# Patient Record
Sex: FEMALE | Race: WHITE | Employment: OTHER | URBAN - METROPOLITAN AREA
[De-identification: names, ages, dates, MRNs, and addresses within clinical notes are randomized per-mention and may not be internally consistent; named-entity substitution may affect disease eponyms.]

---

## 2017-05-15 ENCOUNTER — TELEPHONE (OUTPATIENT)
Dept: INTERNAL MEDICINE CLINIC | Age: 82
End: 2017-05-15

## 2017-05-15 NOTE — TELEPHONE ENCOUNTER
Please call to clarify script Dr. Zia Reynoso sent to the [facility] and they didn't have enough of the medication to fill.   Please call [facility]

## 2017-10-23 RX ORDER — CLOTRIMAZOLE AND BETAMETHASONE DIPROPIONATE 10; .64 MG/G; MG/G
CREAM TOPICAL
Qty: 15 G | Refills: 2 | Status: SHIPPED | OUTPATIENT
Start: 2017-10-23

## 2017-11-14 ENCOUNTER — TELEPHONE (OUTPATIENT)
Dept: INTERNAL MEDICINE CLINIC | Age: 82
End: 2017-11-14

## 2017-11-14 NOTE — TELEPHONE ENCOUNTER
Ellwood Ganser with [facility] called stating she would like to speak with NP Malu or Dr. Boogie Sosa because the antibiotic for patient needs to be called in or e-scribed.  She can be reached at [phone] please advise

## 2017-11-23 ENCOUNTER — APPOINTMENT (OUTPATIENT)
Dept: GENERAL RADIOLOGY | Age: 82
DRG: 871 | End: 2017-11-23
Attending: STUDENT IN AN ORGANIZED HEALTH CARE EDUCATION/TRAINING PROGRAM
Payer: MEDICARE

## 2017-11-23 ENCOUNTER — HOSPITAL ENCOUNTER (INPATIENT)
Age: 82
LOS: 12 days | Discharge: NURSING, REHAB OR LONG-TERM CARE | DRG: 871 | End: 2017-12-05
Attending: STUDENT IN AN ORGANIZED HEALTH CARE EDUCATION/TRAINING PROGRAM | Admitting: FAMILY MEDICINE
Payer: MEDICARE

## 2017-11-23 ENCOUNTER — APPOINTMENT (OUTPATIENT)
Dept: CT IMAGING | Age: 82
DRG: 871 | End: 2017-11-23
Attending: STUDENT IN AN ORGANIZED HEALTH CARE EDUCATION/TRAINING PROGRAM
Payer: MEDICARE

## 2017-11-23 DIAGNOSIS — R47.81 SLURRED SPEECH: Primary | ICD-10-CM

## 2017-11-23 DIAGNOSIS — G45.1 HEMISPHERIC CAROTID ARTERY SYNDROME: ICD-10-CM

## 2017-11-23 PROBLEM — I63.9 CVA (CEREBRAL VASCULAR ACCIDENT) (HCC): Status: ACTIVE | Noted: 2017-11-23

## 2017-11-23 LAB
ALBUMIN SERPL-MCNC: 2.7 G/DL (ref 3.5–5)
ALBUMIN/GLOB SERPL: 0.6 {RATIO} (ref 1.1–2.2)
ALP SERPL-CCNC: 88 U/L (ref 45–117)
ALT SERPL-CCNC: 14 U/L (ref 12–78)
ANION GAP SERPL CALC-SCNC: 7 MMOL/L (ref 5–15)
AST SERPL-CCNC: 18 U/L (ref 15–37)
BASOPHILS # BLD: 0 K/UL (ref 0–0.1)
BASOPHILS NFR BLD: 0 % (ref 0–1)
BILIRUB SERPL-MCNC: 1.5 MG/DL (ref 0.2–1)
BUN SERPL-MCNC: 13 MG/DL (ref 6–20)
BUN/CREAT SERPL: 14 (ref 12–20)
CALCIUM SERPL-MCNC: 9 MG/DL (ref 8.5–10.1)
CHLORIDE SERPL-SCNC: 104 MMOL/L (ref 97–108)
CO2 SERPL-SCNC: 27 MMOL/L (ref 21–32)
CREAT SERPL-MCNC: 0.9 MG/DL (ref 0.55–1.02)
DIFFERENTIAL METHOD BLD: ABNORMAL
EOSINOPHIL # BLD: 0 K/UL (ref 0–0.4)
EOSINOPHIL NFR BLD: 0 % (ref 0–7)
ERYTHROCYTE [DISTWIDTH] IN BLOOD BY AUTOMATED COUNT: 13.8 % (ref 11.5–14.5)
GLOBULIN SER CALC-MCNC: 4.2 G/DL (ref 2–4)
GLUCOSE BLD STRIP.AUTO-MCNC: 101 MG/DL (ref 65–100)
GLUCOSE BLD STRIP.AUTO-MCNC: 140 MG/DL (ref 65–100)
GLUCOSE BLD STRIP.AUTO-MCNC: 152 MG/DL (ref 65–100)
GLUCOSE SERPL-MCNC: 131 MG/DL (ref 65–100)
HCT VFR BLD AUTO: 39.2 % (ref 35–47)
HGB BLD-MCNC: 12.6 G/DL (ref 11.5–16)
INR BLD: 1.5 (ref 0.9–1.2)
LACTATE SERPL-SCNC: 1.8 MMOL/L (ref 0.4–2)
LYMPHOCYTES # BLD: 2.4 K/UL (ref 0.8–3.5)
LYMPHOCYTES NFR BLD: 12 % (ref 12–49)
MCH RBC QN AUTO: 29.9 PG (ref 26–34)
MCHC RBC AUTO-ENTMCNC: 32.1 G/DL (ref 30–36.5)
MCV RBC AUTO: 93.1 FL (ref 80–99)
MONOCYTES # BLD: 1.8 K/UL (ref 0–1)
MONOCYTES NFR BLD: 9 % (ref 5–13)
NEUTS SEG # BLD: 16.1 K/UL (ref 1.8–8)
NEUTS SEG NFR BLD: 79 % (ref 32–75)
PLATELET # BLD AUTO: 238 K/UL (ref 150–400)
POTASSIUM SERPL-SCNC: 4.5 MMOL/L (ref 3.5–5.1)
PROT SERPL-MCNC: 6.9 G/DL (ref 6.4–8.2)
RBC # BLD AUTO: 4.21 M/UL (ref 3.8–5.2)
RBC MORPH BLD: ABNORMAL
SERVICE CMNT-IMP: ABNORMAL
SODIUM SERPL-SCNC: 138 MMOL/L (ref 136–145)
WBC # BLD AUTO: 20.3 K/UL (ref 3.6–11)

## 2017-11-23 PROCEDURE — 99285 EMERGENCY DEPT VISIT HI MDM: CPT

## 2017-11-23 PROCEDURE — 85025 COMPLETE CBC W/AUTO DIFF WBC: CPT | Performed by: STUDENT IN AN ORGANIZED HEALTH CARE EDUCATION/TRAINING PROGRAM

## 2017-11-23 PROCEDURE — 82962 GLUCOSE BLOOD TEST: CPT

## 2017-11-23 PROCEDURE — 77030011943

## 2017-11-23 PROCEDURE — 70450 CT HEAD/BRAIN W/O DYE: CPT

## 2017-11-23 PROCEDURE — 83605 ASSAY OF LACTIC ACID: CPT | Performed by: STUDENT IN AN ORGANIZED HEALTH CARE EDUCATION/TRAINING PROGRAM

## 2017-11-23 PROCEDURE — 80053 COMPREHEN METABOLIC PANEL: CPT | Performed by: STUDENT IN AN ORGANIZED HEALTH CARE EDUCATION/TRAINING PROGRAM

## 2017-11-23 PROCEDURE — 74011000258 HC RX REV CODE- 258: Performed by: FAMILY MEDICINE

## 2017-11-23 PROCEDURE — 74011000250 HC RX REV CODE- 250: Performed by: FAMILY MEDICINE

## 2017-11-23 PROCEDURE — 93005 ELECTROCARDIOGRAM TRACING: CPT

## 2017-11-23 PROCEDURE — 65660000000 HC RM CCU STEPDOWN

## 2017-11-23 PROCEDURE — 36415 COLL VENOUS BLD VENIPUNCTURE: CPT | Performed by: STUDENT IN AN ORGANIZED HEALTH CARE EDUCATION/TRAINING PROGRAM

## 2017-11-23 PROCEDURE — 74011250636 HC RX REV CODE- 250/636: Performed by: STUDENT IN AN ORGANIZED HEALTH CARE EDUCATION/TRAINING PROGRAM

## 2017-11-23 PROCEDURE — 74011250637 HC RX REV CODE- 250/637: Performed by: FAMILY MEDICINE

## 2017-11-23 PROCEDURE — 74011250636 HC RX REV CODE- 250/636: Performed by: FAMILY MEDICINE

## 2017-11-23 PROCEDURE — 85610 PROTHROMBIN TIME: CPT

## 2017-11-23 PROCEDURE — 71010 XR CHEST PORT: CPT

## 2017-11-23 PROCEDURE — 96360 HYDRATION IV INFUSION INIT: CPT

## 2017-11-23 RX ORDER — SORBITOL SOLUTION 70 %
30 SOLUTION, ORAL MISCELLANEOUS
COMMUNITY

## 2017-11-23 RX ORDER — MAGNESIUM SULFATE 100 %
4 CRYSTALS MISCELLANEOUS AS NEEDED
Status: DISCONTINUED | OUTPATIENT
Start: 2017-11-23 | End: 2017-12-05 | Stop reason: HOSPADM

## 2017-11-23 RX ORDER — LEVOFLOXACIN 5 MG/ML
750 INJECTION, SOLUTION INTRAVENOUS ONCE
Status: COMPLETED | OUTPATIENT
Start: 2017-11-23 | End: 2017-11-23

## 2017-11-23 RX ORDER — POLYETHYLENE GLYCOL 3350 17 G/17G
17 POWDER, FOR SOLUTION ORAL DAILY
Status: DISCONTINUED | OUTPATIENT
Start: 2017-11-24 | End: 2017-11-24

## 2017-11-23 RX ORDER — LEVOFLOXACIN 5 MG/ML
750 INJECTION, SOLUTION INTRAVENOUS
Status: DISCONTINUED | OUTPATIENT
Start: 2017-11-25 | End: 2017-11-24

## 2017-11-23 RX ORDER — GUAIFENESIN 600 MG/1
1200 TABLET, EXTENDED RELEASE ORAL 2 TIMES DAILY
Status: DISCONTINUED | OUTPATIENT
Start: 2017-11-23 | End: 2017-12-05 | Stop reason: HOSPADM

## 2017-11-23 RX ORDER — DEXTROSE MONOHYDRATE AND SODIUM CHLORIDE 5; .45 G/100ML; G/100ML
100 INJECTION, SOLUTION INTRAVENOUS CONTINUOUS
Status: DISCONTINUED | OUTPATIENT
Start: 2017-11-23 | End: 2017-11-27

## 2017-11-23 RX ORDER — DEXTROSE 50 % IN WATER (D50W) INTRAVENOUS SYRINGE
12.5-25 AS NEEDED
Status: DISCONTINUED | OUTPATIENT
Start: 2017-11-23 | End: 2017-12-05 | Stop reason: HOSPADM

## 2017-11-23 RX ORDER — PANTOPRAZOLE SODIUM 40 MG/1
40 TABLET, DELAYED RELEASE ORAL DAILY
Status: DISCONTINUED | OUTPATIENT
Start: 2017-11-24 | End: 2017-12-05 | Stop reason: HOSPADM

## 2017-11-23 RX ORDER — INSULIN LISPRO 100 [IU]/ML
INJECTION, SOLUTION INTRAVENOUS; SUBCUTANEOUS
Status: DISCONTINUED | OUTPATIENT
Start: 2017-11-23 | End: 2017-12-05 | Stop reason: HOSPADM

## 2017-11-23 RX ORDER — LEVOTHYROXINE SODIUM 50 UG/1
50 TABLET ORAL
Status: DISCONTINUED | OUTPATIENT
Start: 2017-11-24 | End: 2017-11-29

## 2017-11-23 RX ORDER — LIDOCAINE HCL 4 G/100G
CREAM TOPICAL
COMMUNITY

## 2017-11-23 RX ORDER — HYDROCORTISONE 25 MG/G
CREAM TOPICAL AS NEEDED
COMMUNITY

## 2017-11-23 RX ORDER — FLUTICASONE FUROATE AND VILANTEROL 100; 25 UG/1; UG/1
1 POWDER RESPIRATORY (INHALATION) DAILY
COMMUNITY

## 2017-11-23 RX ORDER — POLYETHYLENE GLYCOL 3350 17 G/17G
17 POWDER, FOR SOLUTION ORAL DAILY
COMMUNITY

## 2017-11-23 RX ORDER — METRONIDAZOLE 500 MG/100ML
500 INJECTION, SOLUTION INTRAVENOUS EVERY 8 HOURS
Status: DISCONTINUED | OUTPATIENT
Start: 2017-11-23 | End: 2017-11-26

## 2017-11-23 RX ORDER — METFORMIN HYDROCHLORIDE 1000 MG/1
1000 TABLET ORAL 2 TIMES DAILY WITH MEALS
COMMUNITY

## 2017-11-23 RX ORDER — PROPRANOLOL HYDROCHLORIDE 10 MG/1
10 TABLET ORAL DAILY
Status: DISCONTINUED | OUTPATIENT
Start: 2017-11-24 | End: 2017-12-05 | Stop reason: HOSPADM

## 2017-11-23 RX ORDER — ATORVASTATIN CALCIUM 20 MG/1
20 TABLET, FILM COATED ORAL
Status: DISCONTINUED | OUTPATIENT
Start: 2017-11-23 | End: 2017-12-05 | Stop reason: HOSPADM

## 2017-11-23 RX ORDER — IPRATROPIUM BROMIDE AND ALBUTEROL SULFATE 2.5; .5 MG/3ML; MG/3ML
3 SOLUTION RESPIRATORY (INHALATION)
Status: DISCONTINUED | OUTPATIENT
Start: 2017-11-23 | End: 2017-12-05 | Stop reason: HOSPADM

## 2017-11-23 RX ORDER — SODIUM CHLORIDE 9 MG/ML
500 INJECTION, SOLUTION INTRAVENOUS ONCE
Status: COMPLETED | OUTPATIENT
Start: 2017-11-23 | End: 2017-11-23

## 2017-11-23 RX ORDER — CEPHALEXIN 500 MG/1
500 CAPSULE ORAL 2 TIMES DAILY
COMMUNITY
End: 2017-12-05

## 2017-11-23 RX ORDER — ACETAMINOPHEN 325 MG/1
650 TABLET ORAL
Status: DISCONTINUED | OUTPATIENT
Start: 2017-11-23 | End: 2017-12-05 | Stop reason: HOSPADM

## 2017-11-23 RX ORDER — NYSTATIN 100000 [USP'U]/G
POWDER TOPICAL 2 TIMES DAILY
Status: DISCONTINUED | OUTPATIENT
Start: 2017-11-23 | End: 2017-12-01

## 2017-11-23 RX ORDER — MIRTAZAPINE 15 MG/1
7.5 TABLET, FILM COATED ORAL
Status: DISCONTINUED | OUTPATIENT
Start: 2017-11-23 | End: 2017-12-05 | Stop reason: HOSPADM

## 2017-11-23 RX ADMIN — GUAIFENESIN 1200 MG: 600 TABLET, EXTENDED RELEASE ORAL at 17:38

## 2017-11-23 RX ADMIN — METRONIDAZOLE 500 MG: 500 INJECTION, SOLUTION INTRAVENOUS at 23:14

## 2017-11-23 RX ADMIN — SODIUM CHLORIDE 500 ML: 900 INJECTION, SOLUTION INTRAVENOUS at 14:30

## 2017-11-23 RX ADMIN — ACETAMINOPHEN 650 MG: 325 TABLET, FILM COATED ORAL at 17:39

## 2017-11-23 RX ADMIN — LEVOFLOXACIN 750 MG: 5 INJECTION, SOLUTION INTRAVENOUS at 16:47

## 2017-11-23 RX ADMIN — MIRTAZAPINE 7.5 MG: 15 TABLET, FILM COATED ORAL at 21:24

## 2017-11-23 RX ADMIN — DEXTROSE MONOHYDRATE AND SODIUM CHLORIDE 100 ML/HR: 5; .45 INJECTION, SOLUTION INTRAVENOUS at 17:39

## 2017-11-23 RX ADMIN — ATORVASTATIN CALCIUM 20 MG: 20 TABLET, FILM COATED ORAL at 21:24

## 2017-11-23 RX ADMIN — METRONIDAZOLE 500 MG: 500 INJECTION, SOLUTION INTRAVENOUS at 15:27

## 2017-11-23 NOTE — PROGRESS NOTES
Day #1 of Levaquin  Indication:  bloodstream infection  Leukocytosis: No evidence of PNA, await UA. Possibly diarrhea. Need to R/O C diff. Other possibilities of ovarian mass infection and or mass effect on GI ( Diarrhea). Current regimen:  750 mg IV Q24H  Abx regimen: Levaquin and Flagyl  Recent Labs      17   1244   WBC  20.3*   CREA  0.90   BUN  13     Est CrCl: 48 ml/min; UO: not assessed  Temp (24hrs), Av.5 °F (37.5 °C), Min:98.7 °F (37.1 °C), Max:101 °F (38.3 °C)    Cultures: None thus far    Plan: Change to 750 mg IV Q48H for CrCl between 20-49 ml/min.      Rola Lott, PharmD

## 2017-11-23 NOTE — ED PROVIDER NOTES
HPI Comments: 80 y.o. female with past medical history significant for CAD, Hypertension, and COPD who presents from [address] with chief complaint of Slurred Speech. Per EMS, nursing facility noticed onset at 1110 today prior to arrival of slurred speech at difficult swallowing. Per nursing staff, patient is A&Ox2 at baseline. Nursing facility notes patient has had recent onset \"over the past three days\" of intermittent diarrhea and generalized weakness. Per EMS, upon arrival patient appeared tachyphemic and placed on 2L of oxygen. Pt had a measured BG of 160 en route. Pt presents to ED with noted slurred speech. Pt denies any acute pain or discomfort. Pt denies current use of blood thinners. There are no other acute medical concerns at this time. PCP: Pilo Breaux MD    Note written by Sveta Talamantes, as dictated by Saulo Reilly MD 12:25 PM    The history is provided by the patient, the EMS personnel and the nursing home. Past Medical History:   Diagnosis Date    CAD (coronary artery disease)     COPD (chronic obstructive pulmonary disease) ([facility] [address]     Hypercholesteremia     Hypertension     Ill-defined condition     mass on ovaries    Psychiatric disorder     bipolar       No past surgical history on file. No family history on file. Social History     Social History    Marital status:      Spouse name: N/A    Number of children: N/A    Years of education: N/A     Occupational History    Not on file. Social History Main Topics    Smoking status: Former Smoker    Smokeless tobacco: Not on file    Alcohol use No    Drug use: No    Sexual activity: Not Currently     Other Topics Concern    Not on file     Social History Narrative         ALLERGIES: Enablex [darifenacin]    Review of Systems   Constitutional: Negative for chills and fever. HENT: Positive for trouble swallowing. Negative for congestion.     Respiratory: Negative for cough and shortness of breath. Cardiovascular: Negative for chest pain. Gastrointestinal: Negative for abdominal pain, diarrhea, nausea and vomiting. Genitourinary: Negative for difficulty urinating and dysuria. Neurological: Positive for speech difficulty. All other systems reviewed and are negative. Vitals:    11/23/17 1232 11/23/17 1300   BP: 152/70 159/62   Pulse: 98 98   Resp: 26 25   Temp: 98.8 °F (37.1 °C)    SpO2: 91% 99%            Physical Exam   Constitutional: She is oriented to person, place, and time. She appears well-developed and well-nourished. No distress. HENT:   Head: Normocephalic and atraumatic. Nose: Nose normal.   Mouth/Throat: Oropharynx is clear and moist. No oropharyngeal exudate. Eyes: Conjunctivae and EOM are normal. Right eye exhibits no discharge. Left eye exhibits no discharge. No scleral icterus. Neck: Normal range of motion. Neck supple. No JVD present. No tracheal deviation present. No thyromegaly present. Cardiovascular: Normal rate, regular rhythm, normal heart sounds and intact distal pulses. Exam reveals no gallop and no friction rub. No murmur heard. Pulmonary/Chest: Effort normal. No stridor. No respiratory distress. She has no wheezes. She has no rales. She exhibits no tenderness. Crackles in bases bilateral   Abdominal: Bowel sounds are normal. She exhibits no distension and no mass. There is no tenderness. There is no rebound. Musculoskeletal: Normal range of motion. She exhibits no edema or tenderness. Lymphadenopathy:     She has no cervical adenopathy. Neurological: She is alert and oriented to person, place, and time. She has normal reflexes. She displays normal reflexes. No cranial nerve deficit. She exhibits normal muscle tone. Coordination normal.   Slurred speech, able to follow commands   Skin: Skin is warm and dry. No rash noted. She is not diaphoretic. No erythema. No pallor. Psychiatric: She has a normal mood and affect.  Her behavior is normal. Judgment and thought content normal.   Nursing note and vitals reviewed. Note written by Lazarus Fordyce, Scribe, as dictated by Prince Prakash MD 12:28 PM      MDM  Number of Diagnoses or Management Options  Slurred speech:      Amount and/or Complexity of Data Reviewed  Clinical lab tests: ordered and reviewed  Tests in the radiology section of CPT®: ordered and reviewed  Obtain history from someone other than the patient: yes  Review and summarize past medical records: yes  Discuss the patient with other providers: yes  Independent visualization of images, tracings, or specimens: yes    Risk of Complications, Morbidity, and/or Mortality  Presenting problems: moderate  Diagnostic procedures: moderate  Management options: moderate    Critical Care  Total time providing critical care: 30-74 minutes (Total critical care time spent exclusive of procedures:  35 min.)    Patient Progress  Patient progress: stable    ED Course       Procedures        PROGRESS NOTE:12:20 PM  Code S called, patient evaluated in CT by provider    NIH of 2    CONSULT NOTE:  12:27 PM Prince Prakash MD spoke with Dr. Kaye Rosen, Consult for Neurology. Discussed available diagnostic tests and clinical findings. She is in agreement with care plans as outlined. PROGRESS NOTE:1:25 PM  Patient's family at bedside reports, patient was recently diagnosed with a UTI and is currently being treated with Keflex. PROGRESS NOTE:2:09 PM  Chest Xray shows no acute cardiopulmonary process seen    CONSULT NOTE:  2:45 PM Prince Prakash MD spoke with Dr. Nighat Mcdaniel, Consult for Hospitalist.  Discussed available diagnostic tests and clinical findings. He is in agreement with care plans as outlined. 12:36 PM  Patient is being admitted to the hospital.  The results of their tests and reasons for their admission have been discussed with them and/or available family.   They convey agreement and understanding for the need to be admitted and for their admission diagnosis. Consultation has been made with the inpatient physician specialist for hospitalization. LABORATORY TESTS:  Recent Results (from the past 12 hour(s))   METABOLIC PANEL, BASIC    Collection Time: 11/24/17  3:05 AM   Result Value Ref Range    Sodium 140 136 - 145 mmol/L    Potassium 3.9 3.5 - 5.1 mmol/L    Chloride 106 97 - 108 mmol/L    CO2 24 21 - 32 mmol/L    Anion gap 10 5 - 15 mmol/L    Glucose 120 (H) 65 - 100 mg/dL    BUN 11 6 - 20 MG/DL    Creatinine 0.83 0.55 - 1.02 MG/DL    BUN/Creatinine ratio 13 12 - 20      GFR est AA >60 >60 ml/min/1.73m2    GFR est non-AA >60 >60 ml/min/1.73m2    Calcium 8.9 8.5 - 10.1 MG/DL   CBC WITH AUTOMATED DIFF    Collection Time: 11/24/17  3:05 AM   Result Value Ref Range    WBC 18.0 (H) 3.6 - 11.0 K/uL    RBC 4.24 3.80 - 5.20 M/uL    HGB 12.4 11.5 - 16.0 g/dL    HCT 39.1 35.0 - 47.0 %    MCV 92.2 80.0 - 99.0 FL    MCH 29.2 26.0 - 34.0 PG    MCHC 31.7 30.0 - 36.5 g/dL    RDW 13.8 11.5 - 14.5 %    PLATELET 200 199 - 220 K/uL    NEUTROPHILS 63 32 - 75 %    LYMPHOCYTES 26 12 - 49 %    MONOCYTES 10 5 - 13 %    EOSINOPHILS 1 0 - 7 %    BASOPHILS 0 0 - 1 %    ABS. NEUTROPHILS 11.3 (H) 1.8 - 8.0 K/UL    ABS. LYMPHOCYTES 4.7 (H) 0.8 - 3.5 K/UL    ABS. MONOCYTES 1.9 (H) 0.0 - 1.0 K/UL    ABS. EOSINOPHILS 0.1 0.0 - 0.4 K/UL    ABS.  BASOPHILS 0.0 0.0 - 0.1 K/UL   TSH 3RD GENERATION    Collection Time: 11/24/17  3:05 AM   Result Value Ref Range    TSH 0.64 0.36 - 3.74 uIU/mL   GLUCOSE, POC    Collection Time: 11/24/17  7:43 AM   Result Value Ref Range    Glucose (POC) 119 (H) 65 - 100 mg/dL    Performed by Maura Castillo, POC    Collection Time: 11/24/17 12:14 PM   Result Value Ref Range    Glucose (POC) 210 (H) 65 - 100 mg/dL    Performed by Fauzia Young        IMAGING RESULTS:  DUPLEX CAROTID BILATERAL         XR CHEST PORT   Final Result      CT CODE NEURO HEAD WO CONTRAST   Final Result      MRI BRAIN WO CONT    (Results Pending)     Xr Chest Port    Result Date: 11/23/2017  EXAM:  XR CHEST PORT INDICATION:  Shortness of breath COMPARISON:  4/7/2016 FINDINGS: A portable AP radiograph of the chest was obtained at 1351 hours. The patient is on a cardiac monitor. The lungs are clear. The cardiac and mediastinal contours and pulmonary vascularity are normal.  The bones and soft tissues are grossly within normal limits.      IMPRESSION: No acute cardiopulmonary process seen       MEDICATIONS GIVEN:  Medications   metroNIDAZOLE (FLAGYL) IVPB premix 500 mg (500 mg IntraVENous New Bag 11/24/17 0649)   levoFLOXacin (LEVAQUIN) 750 mg in D5W IVPB (not administered)   acetaminophen (TYLENOL) tablet 650 mg (650 mg Oral Given 11/24/17 0246)   albuterol-ipratropium (DUO-NEB) 2.5 MG-0.5 MG/3 ML (3 mL Nebulization Given 11/24/17 0412)   atorvastatin (LIPITOR) tablet 20 mg (20 mg Oral Given 11/23/17 2124)   guaiFENesin ER (MUCINEX) tablet 1,200 mg (1,200 mg Oral Given 11/24/17 1037)   lactobac ac& pc-s.therm-b.anim (COBY Q/RISAQUAD) (1 Cap Oral Given 11/24/17 1037)   levothyroxine (SYNTHROID) tablet 50 mcg (50 mcg Oral Given 11/24/17 0649)   mirtazapine (REMERON) tablet 7.5 mg (7.5 mg Oral Given 11/23/17 2124)   nystatin (MYCOSTATIN) 100,000 unit/gram powder ( Topical Held 11/24/17 0900)   pantoprazole (PROTONIX) tablet 40 mg (40 mg Oral Given 11/24/17 1037)   polyethylene glycol (MIRALAX) packet 17 g (17 g Oral Given 11/24/17 1037)   propranolol (INDERAL) tablet 10 mg (10 mg Oral Given 11/24/17 1037)   dextrose 5 % - 0.45% NaCl infusion (100 mL/hr IntraVENous New Bag 11/24/17 0649)   insulin lispro (HUMALOG) injection (0 Units SubCUTAneous Held 11/24/17 0730)   glucose chewable tablet 16 g (not administered)   dextrose (D50W) injection syrg 12.5-25 g (not administered)   glucagon (GLUCAGEN) injection 1 mg (not administered)   0.9% sodium chloride infusion 500 mL (0 mL IntraVENous IV Completed 11/23/17 1600)   levoFLOXacin (LEVAQUIN) 750 mg in D5W IVPB (750 mg IntraVENous New Bag 11/23/17 4196)       IMPRESSION:  1. Slurred speech    2. Hemispheric carotid artery syndrome        PLAN:  1.  Admit to Hospitalist    Total critical care time spent exclusive of procedures:  35 minutes      Kash Posey MD

## 2017-11-23 NOTE — PROGRESS NOTES
TRANSFER - IN REPORT:    Verbal report received from Norberto(name) on [place]  being received from ED(unit) for routine progression of care      Report consisted of patients Situation, Background, Assessment and   Recommendations(SBAR). Information from the following report(s) SBAR, Kardex, Intake/Output, MAR and Recent Results was reviewed with the receiving nurse. Opportunity for questions and clarification was provided. Assessment completed upon patients arrival to unit and care assumed.

## 2017-11-23 NOTE — PROGRESS NOTES
Admission Medication Reconciliation:    Information obtained from: Adria Dumas (son), Israel Olivera LPN / medication list from [facility] [phone]. Significant PMH/Disease States:   Past Medical History:   Diagnosis Date    CAD (coronary artery disease)     COPD (chronic obstructive pulmonary disease) ([facility] [address]     Hypercholesteremia     Hypertension     Ill-defined condition     mass on ovaries    Psychiatric disorder     bipolar       Chief Complaint for this Admission:  Dysartria, SOB    Allergies:  Enablex [darifenacin]    Prior to Admission Medications:   Prior to Admission Medications   Prescriptions Last Dose Informant Patient Reported? Taking? L. acidoph & paracasei- S therm- Bifido (COBY-Q) 8 billion cell cap cap   No Yes   Sig: Take 1 Cap by mouth daily. acetaminophen (TYLENOL) 325 mg tablet 11/23/2017 at am  Yes Yes   Sig: Take 650 mg by mouth every six (6) hours as needed for Pain. albuterol-ipratropium (DUO-NEB) 2.5 mg-0.5 mg/3 ml nebulizer solution 11/23/2017 at 1030  Yes Yes   Sig: 3 mL by Nebulization route three (3) times daily. atorvastatin (LIPITOR) 40 mg tablet 11/22/2017 at pm  Yes Yes   Sig: Take 20 mg by mouth nightly. cephALEXin (KEFLEX) 500 mg capsule 11/22/2017 at 1030  Yes Yes   Sig: Take 500 mg by mouth two (2) times a day. UTI, due to end 11/23/17   cholecalciferol (VITAMIN D3) 1,000 unit cap 11/23/2017 at 1030  Yes Yes   Sig: Take 1,000 Units by mouth daily. clotrimazole-betamethasone (LOTRISONE) topical cream   No Yes   Sig: APPLY TOPICALLY TWICE DAILY   fluticasone-vilanterol (BREO ELLIPTA) 100-25 mcg/dose inhaler 11/23/2017 at am  Yes Yes   Sig: Take 1 Puff by inhalation daily. furosemide (LASIX) 20 mg tablet   Yes Yes   Sig: Take 40 mg by mouth daily. guaiFENesin (MUCINEX) 1,200 mg Ta12 ER tablet   Yes No   Sig: Take 1,200 mg by mouth two (2) times a day.    hydrocortisone (PROCTOSOL HC) 2.5 % rectal cream   Yes Yes   Sig: Insert  into rectum as needed for Hemorrhoids. PRN after bowel movements (hemorrhoids)   levothyroxine (SYNTHROID) 50 mcg tablet   Yes Yes   Sig: Take 50 mcg by mouth Daily (before breakfast). lidocaine (ASPERCREME, LIDOCAINE,) 4 % topical cream   Yes Yes   Sig: Apply  to affected area two (2) times daily as needed for Pain.   lithium carbonate 300 mg tablet   No Yes   Sig: Take 1 Tab by mouth daily. In the morning   metFORMIN (GLUCOPHAGE) 1,000 mg tablet 11/23/2017 at 1030  Yes Yes   Sig: Take 1,000 mg by mouth two (2) times daily (with meals). mirtazapine (REMERON) 7.5 mg tablet   No Yes   Sig: Take 1 Tab by mouth nightly. nystatin (MYCOSTATIN) powder   Yes No   Sig: Apply  to affected area two (2) times a day. omeprazole (PRILOSEC) 20 mg capsule   Yes Yes   Sig: Take 20 mg by mouth daily. polyethylene glycol (MIRALAX) 17 gram packet   Yes Yes   Sig: Take 17 g by mouth daily. propranolol (INDERAL) 10 mg tablet 11/23/2017 at am  Yes Yes   Sig: Take 10 mg by mouth daily. sorbitol 70 % solution   Yes Yes   Sig: Take 30 mL by mouth every eight (8) hours as needed for Other (constipation). tiotropium (SPIRIVA WITH HANDIHALER) 18 mcg inhalation capsule 11/23/2017 at 1030  Yes Yes   Sig: Take 1 Cap by inhalation daily. Facility-Administered Medications: None         Comments/Recommendations: Son provided allergy clarification, LPN updated administration times as we reviewed each individual medication. Please note:  1. CEPHALEXIN for UTI was scheduled to complete today, and she received last dose around 1030 per LPN. Revised:  1. Atorvastatin to 20 mg  2. Furosemide to 40 mg    Added:  1. Metformin  2. Cephalexin   3. Breo Ellipta  4. Sorbitol prn  5. Spiriva  6. Proctosol hemorrhoid creeam    Deleted:  1. Amlodipine  2. KLV78 (per policy)  3. Formoterol, budesonide  4. Robitussin  5. Lidocaine patch  6. Melatonin (per policy)  7.  Methotrexate    Thank you for allowing me to participate in the care of your patient.     David EspinalD, RN #[id number]

## 2017-11-23 NOTE — H&P
Verito Calloway MD  Please call  and page for questions. Call physician on-call through the  7pm-7am      History & Physical    Primary Care Provider: Radha Conley MD  Source of Information: Patient, her family at the bedside and medical record. History of Presenting Illness:   Adeline Chao is a 80 y.o. female with past medical history of bipolar disorder on lithium,  hematuria, arthritis, COPD, hypertension, cataract surgery, who presented to the ED via EMS form [facility] with slurred speech at 1110 AM his AM.  EMS reported pt has minimal slurred speech in route, placed on 2L d/t tachypnea. Patient also had  difficult swallowing. Patient slurred speech has been resolved while at the ED. Patient is  A&Ox2 at baseline. . Patient has had recent onset \"over the past three days\" of intermittent diarrhea and generalized weakness. Lukas  was checked for C diff which was negative. Pt blood glucose was  160 en route. The patient denies any fever, chills, chest pain, cough, congestion, recent illness, palpitations, or dysuria. Patient denies any acute pain or discomfort. Patient denies current use of blood thinners. Review of Systems:  A comprehensive review of systems was negative except for that written in the History of Present Illness. Past Medical History:   Diagnosis Date    CAD (coronary artery disease)     COPD (chronic obstructive pulmonary disease) ([facility] [address]     Hypercholesteremia     Hypertension     Ill-defined condition     mass on ovaries    Psychiatric disorder     bipolar      No past surgical history on file. Prior to Admission medications    Medication Sig Start Date End Date Taking? Authorizing Provider   cephALEXin (KEFLEX) 500 mg capsule Take 500 mg by mouth two (2) times a day.  UTI, due to end 11/23/17   Yes Historical Provider   fluticasone-vilanterol (BREO ELLIPTA) 100-25 mcg/dose inhaler Take 1 Puff by inhalation daily. Yes Historical Provider   polyethylene glycol (MIRALAX) 17 gram packet Take 17 g by mouth daily. Yes Historical Provider   tiotropium (SPIRIVA WITH HANDIHALER) 18 mcg inhalation capsule Take 1 Cap by inhalation daily. Yes Historical Provider   sorbitol 70 % solution Take 30 mL by mouth every eight (8) hours as needed for Other (constipation). Yes Historical Provider   lidocaine (ASPERCREME, LIDOCAINE,) 4 % topical cream Apply  to affected area two (2) times daily as needed for Pain. Yes Historical Provider   hydrocortisone (PROCTOSOL HC) 2.5 % rectal cream Insert  into rectum as needed for Hemorrhoids. PRN after bowel movements (hemorrhoids)   Yes Historical Provider   clotrimazole-betamethasone (LOTRISONE) topical cream APPLY TOPICALLY TWICE DAILY 10/23/17  Yes Aram Nicholson NP   furosemide (LASIX) 20 mg tablet Take 40 mg by mouth daily. Yes Historical Provider   acetaminophen (TYLENOL) 325 mg tablet Take 650 mg by mouth every six (6) hours as needed for Pain. Yes Historical Provider   NOMI acidoph & paracasei- S therm- Bifido (COBY-Q) 8 billion cell cap cap Take 1 Cap by mouth daily. 3/2/16  Yes Lily Siegel MD   mirtazapine (REMERON) 7.5 mg tablet Take 1 Tab by mouth nightly. 3/2/16  Yes Lily Siegel MD   lithium carbonate 300 mg tablet Take 1 Tab by mouth daily. In the morning 3/2/16  Yes Lily Siegel MD   levothyroxine (SYNTHROID) 50 mcg tablet Take 50 mcg by mouth Daily (before breakfast). Yes Ethan Quinonez MD   omeprazole (PRILOSEC) 20 mg capsule Take 20 mg by mouth daily. Yes Ethan Quinonez MD   cholecalciferol (VITAMIN D3) 1,000 unit cap Take 1,000 Units by mouth daily. Yes Ethan Quinonez MD   albuterol-ipratropium (DUO-NEB) 2.5 mg-0.5 mg/3 ml nebulizer solution 3 mL by Nebulization route three (3) times daily. Yes Ethan Quinonez MD   propranolol (INDERAL) 10 mg tablet Take 10 mg by mouth daily.    Yes Historical Provider atorvastatin (LIPITOR) 40 mg tablet Take 20 mg by mouth nightly. Yes Historical Provider   nystatin (MYCOSTATIN) powder Apply  to affected area two (2) times a day. Historical Provider   guaiFENesin (MUCINEX) 1,200 mg Ta12 ER tablet Take 1,200 mg by mouth two (2) times a day. Ethan Quinonez MD     Allergies   Allergen Reactions    Enablex [Darifenacin] Rash      No family history on file. SOCIAL HISTORY:  Patient resides:  Independently X   Assisted Living    SNF    With family care       Smoking history:   None X   Former X   Chronic      Alcohol history:   None X   Social    Chronic      Ambulates:   Independently X   w/cane    w/walker    w/wc    CODE STATUS:  DNR    Full X   Other      Objective:     Physical Exam:     Visit Vitals    /56 (BP 1 Location: Right arm, BP Patient Position: At rest)    Pulse 100    Temp 98.8 °F (37.1 °C)    Resp 23    SpO2 99%    O2 Flow Rate (L/min): 2 l/min O2 Device: Nasal cannula    General:  Alert, cooperative, no distress, appears stated age. Head:  Normocephalic, without obvious abnormality, atraumatic. Eyes:  Conjunctivae/corneas clear. PERRL, EOMs intact. Neck: Supple, symmetrical, trachea midline, no adenopathy, thyroid: no enlargement/tenderness/nodules, no carotid bruit and no JVD. Back:   Symmetric, no curvature. ROM normal. No CVA tenderness. Lungs:   Clear to auscultation bilaterally. Heart:  Regular rate and rhythm, S1, S2 normal, no murmur. Abdomen:   Soft, non-tender. Bowel sounds normal.    Extremities: Extremities normal, atraumatic, no cyanosis or edema. Pulses: 2+ and symmetric all extremities. Skin: Skin color, texture, turgor normal. No rashes or lesions   Neurologic: CNII-XII intact.             Data Review:     Recent Days:  Recent Labs      11/23/17   1244   WBC  20.3*   HGB  12.6   HCT  39.2   PLT  238     Recent Labs      11/23/17   1244  11/23/17   1234   NA  138   --    K  4.5   --    CL  104   --    CO2  27   -- GLU  131*   --    BUN  13   --    CREA  0.90   --    CA  9.0   --    ALB  2.7*   --    SGOT  18   --    ALT  14   --    INR   --   1.5*     No results for input(s): PH, PCO2, PO2, HCO3, FIO2 in the last 72 hours. 24 Hour Results:  Recent Results (from the past 24 hour(s))   GLUCOSE, POC    Collection Time: 11/23/17 12:23 PM   Result Value Ref Range    Glucose (POC) 152 (H) 65 - 100 mg/dL    Performed by Michelet Lim    POC INR    Collection Time: 11/23/17 12:34 PM   Result Value Ref Range    INR (POC) 1.5 (H) <1.2     EKG, 12 LEAD, INITIAL    Collection Time: 11/23/17 12:40 PM   Result Value Ref Range    Ventricular Rate 101 BPM    Atrial Rate 101 BPM    P-R Interval 164 ms    QRS Duration 96 ms    Q-T Interval 370 ms    QTC Calculation (Bezet) 479 ms    Calculated P Axis 33 degrees    Calculated R Axis -88 degrees    Calculated T Axis 73 degrees    Diagnosis       ** Poor data quality, interpretation may be adversely affected  Sinus tachycardia  Left axis deviation  Septal infarct , age undetermined  When compared with ECG of 07-APR-2016 10:47,  ND interval has decreased  Vent. rate has increased BY  35 BPM  Septal infarct is now present  QT has lengthened     CBC WITH AUTOMATED DIFF    Collection Time: 11/23/17 12:44 PM   Result Value Ref Range    WBC 20.3 (H) 3.6 - 11.0 K/uL    RBC 4.21 3.80 - 5.20 M/uL    HGB 12.6 11.5 - 16.0 g/dL    HCT 39.2 35.0 - 47.0 %    MCV 93.1 80.0 - 99.0 FL    MCH 29.9 26.0 - 34.0 PG    MCHC 32.1 30.0 - 36.5 g/dL    RDW 13.8 11.5 - 14.5 %    PLATELET 552 009 - 504 K/uL    NEUTROPHILS 79 (H) 32 - 75 %    LYMPHOCYTES 12 12 - 49 %    MONOCYTES 9 5 - 13 %    EOSINOPHILS 0 0 - 7 %    BASOPHILS 0 0 - 1 %    ABS. NEUTROPHILS 16.1 (H) 1.8 - 8.0 K/UL    ABS. LYMPHOCYTES 2.4 0.8 - 3.5 K/UL    ABS. MONOCYTES 1.8 (H) 0.0 - 1.0 K/UL    ABS. EOSINOPHILS 0.0 0.0 - 0.4 K/UL    ABS.  BASOPHILS 0.0 0.0 - 0.1 K/UL    DF MANUAL      RBC COMMENTS NORMOCYTIC, NORMOCHROMIC     METABOLIC PANEL, COMPREHENSIVE    Collection Time: 11/23/17 12:44 PM   Result Value Ref Range    Sodium 138 136 - 145 mmol/L    Potassium 4.5 3.5 - 5.1 mmol/L    Chloride 104 97 - 108 mmol/L    CO2 27 21 - 32 mmol/L    Anion gap 7 5 - 15 mmol/L    Glucose 131 (H) 65 - 100 mg/dL    BUN 13 6 - 20 MG/DL    Creatinine 0.90 0.55 - 1.02 MG/DL    BUN/Creatinine ratio 14 12 - 20      GFR est AA >60 >60 ml/min/1.73m2    GFR est non-AA 59 (L) >60 ml/min/1.73m2    Calcium 9.0 8.5 - 10.1 MG/DL    Bilirubin, total 1.5 (H) 0.2 - 1.0 MG/DL    ALT (SGPT) 14 12 - 78 U/L    AST (SGOT) 18 15 - 37 U/L    Alk. phosphatase 88 45 - 117 U/L    Protein, total 6.9 6.4 - 8.2 g/dL    Albumin 2.7 (L) 3.5 - 5.0 g/dL    Globulin 4.2 (H) 2.0 - 4.0 g/dL    A-G Ratio 0.6 (L) 1.1 - 2.2     LACTIC ACID    Collection Time: 11/23/17  1:39 PM   Result Value Ref Range    Lactic acid 1.8 0.4 - 2.0 MMOL/L         Imaging:     Assessment and Plan:       Slurred speech:  Resolved now. Will admit to the neuro floor and do serial neuro check. Will get MRI, TTE, carotid doppler. Neuro consult. PT/OT/SLT.       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 Probably malignant lesion. Patient and family are not pursuing any further intervention in this regard. Diarrhea:   patient has been multiple antibiotics as Levaquin for PNA and on going Keflex for UTI. Recent C dif was negative at the nursing home. Leukocytosis: No evidence of PNA, await UA. Possibly diarrhea. Need to R/O C diff. Other possibilities of ovarian mass infection and or mass effect on GI ( Diarrhea). Will do Levaquin and flagyl now and monitor. Follow cultures. Chronic respiratory failure due to Chronic obstructive pulmonary disease: Resume home regimen,home O2      Hypertension:   BP is reasonable now. Resume her home medicines and monitor.       Bipolar disorder. Resume her home medicines. DM: BG is reasonable. Will hold her metformin.  Will do SSI for now.      History of rheumatoid arthritis on prednisone and methotrexate.      Hypothyroidism. Continue levothyroxine.       See orders for other plans:   VTE prophylaxis: Lovenox  Code status: Full  Discussed plan of care with Patient/Family and Nurse   Pre-admission lived at [facility]. Discharge planning: pending.            Signed By: Dary Sutherland MD     November 23, 2017

## 2017-11-23 NOTE — ED TRIAGE NOTES
Pt arrives via EMS from [facility] for sudden slurred speech at 1110. EMS reported pt has minimal slurred speech in route, placed on 2L d/t tachypnea. GCS 14 at baseline. Pt was tachycardic in route. MD at bedside in CT. NIH 1 upon arrival.  equal bilaterally. Speech is delayed.

## 2017-11-23 NOTE — IP AVS SNAPSHOT
[address] 
[phone] Patient: Nell Bertrand MRN: [id number] YEF:[id number] My Medications STOP taking these medications   
 cephALEXin 500 mg capsule Commonly known as:  Dellia Cons TAKE these medications as instructed Instructions Each Dose to Equal  
 Morning Noon Evening Bedtime  
 acetaminophen 325 mg tablet Commonly known as:  TYLENOL Your last dose was: Your next dose is: Take 650 mg by mouth every six (6) hours as needed for Pain. 650 mg  
    
   
   
   
  
 albuterol-ipratropium 2.5 mg-0.5 mg/3 ml Nebu Commonly known as:  Valerie Jose Alfredo Your last dose was: Your next dose is:    
   
   
 3 mL by Nebulization route three (3) times daily. 3 mL  
    
   
   
   
  
 ASPERCREME (LIDOCAINE) 4 % topical cream  
Generic drug:  lidocaine Your last dose was: Your next dose is:    
   
   
 Apply  to affected area two (2) times daily as needed for Pain. aspirin 81 mg chewable tablet Your last dose was: Your next dose is: Take 1 Tab by mouth daily for 30 days. 81 mg  
    
   
   
   
  
 BREO ELLIPTA 100-25 mcg/dose inhaler Generic drug:  fluticasone-vilanterol Your last dose was: Your next dose is: Take 1 Puff by inhalation daily. 1 Puff  
    
   
   
   
  
 cholecalciferol 1,000 unit Cap Commonly known as:  VITAMIN D3 Your last dose was: Your next dose is: Take 1,000 Units by mouth daily. 1000 Units  
    
   
   
   
  
 clotrimazole-betamethasone topical cream  
Commonly known as:  Kp Jian Your last dose was: Your next dose is:    
   
   
 APPLY TOPICALLY TWICE DAILY  
     
   
   
   
  
 furosemide 20 mg tablet Commonly known as:  LASIX Your last dose was: Your next dose is: Take 40 mg by mouth daily. 40 mg  
    
   
   
   
  
 guaiFENesin 1,200 mg Ta12 ER tablet Commonly known as:  [address] Your last dose was: Your next dose is: Take 1,200 mg by mouth two (2) times a day. 1200 mg  
    
   
   
   
  
 L. acidoph & paracasei- S therm- Bifido 8 billion cell Cap cap Commonly known as:  COBY-Q/RISAQUAD Your last dose was: Your next dose is: Take 1 Cap by mouth daily. 1 Cap  
    
   
   
   
  
 levothyroxine 50 mcg tablet Commonly known as:  SYNTHROID Your last dose was: Your next dose is: Take 50 mcg by mouth Daily (before breakfast). 50 mcg LIPITOR 40 mg tablet Generic drug:  atorvastatin Your last dose was: Your next dose is: Take 20 mg by mouth nightly. 20 mg  
    
   
   
   
  
 lithium carbonate 300 mg tablet Your last dose was: Your next dose is: Take 1 Tab by mouth daily. In the morning 300 mg  
    
   
   
   
  
 metFORMIN 1,000 mg tablet Commonly known as:  GLUCOPHAGE Your last dose was: Your next dose is: Take 1,000 mg by mouth two (2) times daily (with meals). 1000 mg MIRALAX 17 gram packet Generic drug:  polyethylene glycol Your last dose was: Your next dose is: Take 17 g by mouth daily. 17 g  
    
   
   
   
  
 mirtazapine 7.5 mg tablet Commonly known as:  Hunetr Maser Your last dose was: Your next dose is: Take 1 Tab by mouth nightly. 7.5 mg  
    
   
   
   
  
 nystatin powder Commonly known as:  MYCOSTATIN Your last dose was: Your next dose is:    
   
   
 Apply  to affected area two (2) times a day. omeprazole 20 mg capsule Commonly known as:  PRILOSEC Your last dose was: Your next dose is: Take 20 mg by mouth daily. 20 mg  
    
   
   
   
  
 PROCTOSOL HC 2.5 % rectal cream  
Generic drug:  hydrocortisone Your last dose was: Your next dose is: Insert  into rectum as needed for Hemorrhoids. PRN after bowel movements (hemorrhoids) propranolol 10 mg tablet Commonly known as:  INDERAL Your last dose was: Your next dose is: Take 10 mg by mouth daily. 10 mg  
    
   
   
   
  
 sorbitol 70 % solution Your last dose was: Your next dose is: Take 30 mL by mouth every eight (8) hours as needed for Other (constipation). 30 mL SPIRIVA WITH HANDIHALER 18 mcg inhalation capsule Generic drug:  tiotropium Your last dose was: Your next dose is: Take 1 Cap by inhalation daily. 1 Cap Where to Get Your Medications These medications were sent to [facility]/pharmacy #[id number]  [address] [place] [address], [address] [place] [address] Phone:  [phone]  
  aspirin 81 mg chewable tablet

## 2017-11-23 NOTE — ED NOTES
1220: Pt placed on 2L d/t 91% sats. Gave pt's bracelets and rings to son. 1230: Teleneurology at bedside  1330: Attempted straight cath, no urine produced  1345: Xray at bedside  1430: Pt's family remains at bedside  1530: Attempted report, asked to call back in 10 mins.   1550: Reaport attempted, RN to return call

## 2017-11-23 NOTE — IP AVS SNAPSHOT
[address] 
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  
continued care Phone:  [phone] Where:  [address], [address] [address] Follow up with Napoleon Phan MD  
  
Phone:  [phone] Where:  [address] [place], [address] , [facility] [address] Follow up with Carolina Bravo MD in 4 week(s) Voiding trial  
  
Phone:  [phone] Where:  [address] [id number], [address] [place] [address] Discharge Orders None A check concepcion indicates which time of day the medication should be taken. My Medications STOP taking these medications   
 cephALEXin 500 mg capsule Commonly known as:  Tavares Fuentes TAKE these medications as instructed Instructions Each Dose to Equal  
 Morning Noon Evening Bedtime  
 acetaminophen 325 mg tablet Commonly known as:  TYLENOL Your last dose was: Your next dose is: Take 650 mg by mouth every six (6) hours as needed for Pain. 650 mg  
    
   
   
   
  
 albuterol-ipratropium 2.5 mg-0.5 mg/3 ml Nebu Commonly known as:  Kristel Caraballoe Your last dose was: Your next dose is:    
   
   
 3 mL by Nebulization route three (3) times daily. 3 mL  
    
   
   
   
  
 ASPERCREME (LIDOCAINE) 4 % topical cream  
Generic drug:  lidocaine Your last dose was: Your next dose is:    
   
   
 Apply  to affected area two (2) times daily as needed for Pain. aspirin 81 mg chewable tablet Your last dose was: Your next dose is: Take 1 Tab by mouth daily for 30 days. 81 mg  
    
   
   
   
  
 BREO ELLIPTA 100-25 mcg/dose inhaler Generic drug:  fluticasone-vilanterol Your last dose was: Your next dose is: Take 1 Puff by inhalation daily. 1 Puff  
    
   
   
   
  
 cholecalciferol 1,000 unit Cap Commonly known as:  VITAMIN D3 Your last dose was: Your next dose is: Take 1,000 Units by mouth daily. 1000 Units  
    
   
   
   
  
 clotrimazole-betamethasone topical cream  
Commonly known as:  Shade Macias Your last dose was: Your next dose is:    
   
   
 APPLY TOPICALLY TWICE DAILY  
     
   
   
   
  
 furosemide 20 mg tablet Commonly known as:  LASIX Your last dose was: Your next dose is: Take 40 mg by mouth daily. 40 mg  
    
   
   
   
  
 guaiFENesin 1,200 mg Ta12 ER tablet Commonly known as:  [place] Your last dose was: Your next dose is: Take 1,200 mg by mouth two (2) times a day. 1200 mg  
    
   
   
   
  
 L. acidoph & paracasei- S therm- Bifido 8 billion cell Cap cap Commonly known as:  COBY-Q/RISAQUAD Your last dose was: Your next dose is: Take 1 Cap by mouth daily. 1 Cap  
    
   
   
   
  
 levothyroxine 50 mcg tablet Commonly known as:  SYNTHROID Your last dose was: Your next dose is: Take 50 mcg by mouth Daily (before breakfast). 50 mcg LIPITOR 40 mg tablet Generic drug:  atorvastatin Your last dose was: Your next dose is: Take 20 mg by mouth nightly. 20 mg  
    
   
   
   
  
 lithium carbonate 300 mg tablet Your last dose was: Your next dose is: Take 1 Tab by mouth daily. In the morning 300 mg  
    
   
   
   
  
 metFORMIN 1,000 mg tablet Commonly known as:  GLUCOPHAGE  
   
 Your last dose was: Your next dose is: Take 1,000 mg by mouth two (2) times daily (with meals). 1000 mg MIRALAX 17 gram packet Generic drug:  polyethylene glycol Your last dose was: Your next dose is: Take 17 g by mouth daily. 17 g  
    
   
   
   
  
 mirtazapine 7.5 mg tablet Commonly known as:  Monique Crump Your last dose was: Your next dose is: Take 1 Tab by mouth nightly. 7.5 mg  
    
   
   
   
  
 nystatin powder Commonly known as:  MYCOSTATIN Your last dose was: Your next dose is:    
   
   
 Apply  to affected area two (2) times a day. omeprazole 20 mg capsule Commonly known as:  PRILOSEC Your last dose was: Your next dose is: Take 20 mg by mouth daily. 20 mg  
    
   
   
   
  
 PROCTOSOL HC 2.5 % rectal cream  
Generic drug:  hydrocortisone Your last dose was: Your next dose is: Insert  into rectum as needed for Hemorrhoids. PRN after bowel movements (hemorrhoids) propranolol 10 mg tablet Commonly known as:  INDERAL Your last dose was: Your next dose is: Take 10 mg by mouth daily. 10 mg  
    
   
   
   
  
 sorbitol 70 % solution Your last dose was: Your next dose is: Take 30 mL by mouth every eight (8) hours as needed for Other (constipation). 30 mL SPIRIVA WITH HANDIHALER 18 mcg inhalation capsule Generic drug:  tiotropium Your last dose was: Your next dose is: Take 1 Cap by inhalation daily. 1 Cap Where to Get Your Medications These medications were sent to [facility][id number]- [place], [facility]  [address] [place] [address], [address] [place] [address] Phone:  [phone] aspirin 81 mg chewable tablet Discharge Instructions Discharge SNF/Rehab Instructions/LTAC  
 
 
PATIENT ID: Tj Manrique MRN: [id number] YOB: 1930 DATE OF ADMISSION: 11/23/2017 12:19 PM   
DATE OF DISCHARGE: 12/4/2017 PRIMARY CARE PROVIDER: Artem Easton MD  
 
 
ATTENDING PHYSICIAN: Stacie Dasilva MD 
DISCHARGING PROVIDER: Stacie Dasilva MD    
To contact this individual call [phone] and ask the  to page. If unavailable ask to be transferred the Adult Hospitalist Department. CONSULTATIONS: IP CONSULT TO HOSPITALIST 
IP CONSULT TO NEUROLOGY 
IP CONSULT TO PSYCHIATRY IP CONSULT TO UROLOGY PROCEDURES/SURGERIES: * No surgery found * [address] COURSE:  
 
81 yo woman with obesity, bipolar disorder, COPD, HLD, HTN, and reported left ovarian cyst was BIBEMS to the ED from [address] [place] [address] on 11/23/17 with sudden onset slurred speech and difficulty swallowing. She presented under Code Stroke. Facility reportedly had C diff outbreak, and patient had diarrhea that tested C diff negative. She was admitted to [facility] for dysarthria.  
  
Assessment & Plan:  
  
Sepsis likely due to UTI (POA) - tachycardia, fever, leucocytosis, tachypnea on admission resolved - On zosyn which will cover both UTI and atelectasis- completed 
- added diflucan for 3 days for genial fungal infection- completed 
  
Dysarthria  
- resolved PTA 
- CT head wo contrast 11/23 no acute findings and no change since 4/7/2016 
- TTE 11/23 EF 60-65%, no RWMA 
- b/l carotid duplex 11/24 unremarkable - MRI brain 11/25 no acute; chronic changes 
- started ASA 81mg daily 
- regular diet per SLP 
- TSH, vitamin D, B12 WNL 
  
Left ovarian cyst (POA) - patient/family aware, no further workup as last time OB suggested conservative MGMT also pt may not be able to tolerate ant surgery king inserted due to re tension and need voiding trial  
  
Diarrhea - none since presentation; reportedly negative C diff at [facility] 
  
COPD 
- pt will need CPAP at 8 setting during sleep - Does not qualify for trilogy per pulmonary will d/w pulm to see if CPAP will be enough  
  
HTN - controlled at this time 
  
Bipolar disorder - controlled on home meds will resume lithium 
  
Rheumatoid arthritis - continue prednisone and methotrexate 
  
DM2 - controlled, metformin on hold; FBS, SSI 
  
Hypothyroidism - TFTs WNL; continue levothyroxine 
  
Urinary retention s/p straight cath still retaining placed king cath will need voiding trial  
  
Obesity, Body mass index is 31.59 kg/(m^2). PENDING TEST RESULTS:  
At the time of discharge the following test results are still pending: FOLLOW UP APPOINTMENTS:   
Follow-up Information Follow up With Details Comments Contact Info [facility] care [address] [place] [id number] [phone] Mino Cabrera MD   [address] [place] [facility] [address] 
[phone] Rj Lockhart MD In 4 weeks Voiding trial [address] [address] 
[phone] ADDITIONAL CARE RECOMMENDATIONS:  
 
DIET: Regular Diet /  
 
TUBE FEEDING INSTRUCTIONS:  
 
OXYGEN / BiPAP SETTINGS:  CPAP 8 cm H2O 
 
ACTIVITY: Activity as tolerated WOUND CARE:  
 
EQUIPMENT needed: cpap DISCHARGE MEDICATIONS: 
 See Medication Reconciliation Form NOTIFY YOUR PHYSICIAN FOR ANY OF THE FOLLOWING:  
Fever over 101 degrees for 24 hours. Chest pain, shortness of breath, fever, chills, nausea, vomiting, diarrhea, change in mentation, falling, weakness, bleeding. Severe pain or pain not relieved by medications. Or, any other signs or symptoms that you may have questions about. DISPOSITION: 
  Home With: 
 OT  PT  HH  RN  
  
 SNF/Inpatient Rehab/LTAC  
x Independent/assisted living Hospice Other:  
 
 
PATIENT CONDITION AT DISCHARGE:  
 
Functional status  
x Poor Deconditioned Independent Cognition Kathleen Head Forgetful Dementia Catheters/lines (plus indication) x William due to retension PICC   
 PEG None Code status  
x  Full code DNR   
 
PHYSICAL EXAMINATION AT DISCHARGE: 
  
Constitutional:  alert and awake ENT:  oral mucosa moist, oropharynx benign Resp:  CTA bilaterally anteriorly CV:  regular rhythm, normal rate, GI:  +BS, soft, non distended, non tender, obese Musculoskeletal:  moves all extremities Neurologic:   AAOx2 to person and place (baseline), follows commands  
 
  
 
 
 
CHRONIC MEDICAL DIAGNOSES: 
Problem List as of 12/4/2017  Date Reviewed: 12/4/2017 Codes Class Noted - Resolved Abdominal distension ICD-10-CM: R14.0 ICD-9-CM: 787.3  2/27/2016 - Present Pelvic mass in female ICD-10-CM: R19.00 ICD-9-CM: 789.30  2/24/2016 - Present Confusion ICD-10-CM: R41.0 ICD-9-CM: 298.9  2/21/2016 - Present Bipolar affective disorder, manic, severe ([facility] ICD-10-CM: F31.13 
ICD-9-CM: 296.43 Acute 12/12/2012 - Present * (Principal)RESOLVED: Hemispheric carotid artery syndrome ICD-10-CM: G45.1 ICD-9-CM: 435.8  11/23/2017 - 12/4/2017 RESOLVED: Lithium toxicity ICD-10-CM: P96.770A ICD-9-CM: 985.8, E980.9  4/15/2016 - 4/15/2016 RESOLVED: Hypotension ICD-10-CM: I95.9 ICD-9-CM: 458.9  4/7/2016 - 4/15/2016 CDMP Checked:  
Yes x PROBLEM LIST Updated: 
Yes x Signed:  
Aziza Dutton MD 
12/4/2017 
9:14 AM 
 
  
 
ACO Transitions of Care Introducing [employer] offers a voluntary care coordination program to provide high quality service and care to Psychiatric fee-for-service beneficiaries. Jaime Akbar was designed to help you enhance your health and well-being through the following services: ? Transitions of Care  support for individuals who are transitioning from one care setting to another (example: Hospital to home). ? Chronic and Complex Care Coordination  support for individuals and caregivers of those with serious or chronic illnesses or with more than one chronic (ongoing) condition and those who take a number of different medications. If you meet specific medical criteria, a [facility] may call you directly to coordinate your care with your primary care physician and your other care providers. For questions about the [facility] programs, please, contact your physicians office. For general questions or additional information about Accountable Care Organizations: 
Please visit www.medicare.gov/acos. html or call 1-800-MEDICARE (3-864.942.6388) TTY users should call 1-386.254.6657. AirPlug Announcement We are excited to announce that we are making your provider's discharge notes available to you in AirPlug. You will see these notes when they are completed and signed by the physician that discharged you from your recent hospital stay. If you have any questions or concerns about any information you see in AirPlug, please call the Health Information Department where you were seen or reach out to your Primary Care Provider for more information about your plan of care. Introducing [facility] [place] [facility]! Ashly Tucker introduces AirPlug patient portal. Now you can access parts of your medical record, email your doctor's office, and request medication refills online. 1. In your internet browser, go to [email or web address] 2. Click on the First Time User? Click Here link in the Sign In box. You will see the New Member Sign Up page. 3. Enter your AirPlug Access Code exactly as it appears below. You will not need to use this code after youve completed the sign-up process. If you do not sign up before the expiration date, you must request a new code. · Allakos Access Code: [email or web address] Expires: 3/4/2018  9:42 AM 
 
4. Enter the last four digits of your Social Security Number (xxxx) and Date of Birth (mm/dd/yyyy) as indicated and click Submit. You will be taken to the next sign-up page. 5. Create a Allakos ID. This will be your Allakos login ID and cannot be changed, so think of one that is secure and easy to remember. 6. Create a Allakos password. You can change your password at any time. 7. Enter your Password Reset Question and Answer. This can be used at a later time if you forget your password. 8. Enter your e-mail address. You will receive e-mail notification when new information is available in [address] 9. Click Sign Up. You can now view and download portions of your medical record. 10. Click the Download Summary menu link to download a portable copy of your medical information. If you have questions, please visit the Frequently Asked Questions section of the Allakos website. Remember, Allakos is NOT to be used for urgent needs. For medical emergencies, dial 911. Now available from your iPhone and Android! Providers Seen During Your Hospitalization Provider Specialty Primary office phone Juany Forbes MD Emergency Medicine [phone] María Christiansen MD [facility] [phone] Rosealee Blizzard, MD Internal Medicine [phone] Jemma Pastor MD Internal Medicine [phone] Abhinav Das MD Internal Medicine [phone] Your Primary Care Physician (PCP) Primary Care Physician Office Phone Office Fax Mary Hanson [phone] You are allergic to the following Allergen Reactions Enablex (Darifenacin) Rash Recent Documentation Height Weight BMI OB Status Smoking Status 1.651 m 92.3 kg 33.86 kg/m2 Postmenopausal Former Smoker Emergency Contacts Name Discharge Info Relation Home Work Mobile Sunny Villareal DISCHARGE CAREGIVER [3] Child [2] [phone] MonoYeison  Child [2]   [phone] Sunny Agosto  Child [2] [phone] Maikel Villareal  Child [2] Lissette Byrd  Child [2] Patient Belongings The following personal items are in your possession at time of discharge: 
  Dental Appliances: None         Home Medications: None   Jewelry: None  Clothing: Socks    Other Valuables: None Please provide this summary of care documentation to your next provider. Signatures-by signing, you are acknowledging that this After Visit Summary has been reviewed with you and you have received a copy. Patient Signature:  ____________________________________________________________ Date:  ____________________________________________________________  
  
[facility] Provider Signature:  ____________________________________________________________ Date:  ____________________________________________________________

## 2017-11-24 ENCOUNTER — APPOINTMENT (OUTPATIENT)
Dept: MRI IMAGING | Age: 82
DRG: 871 | End: 2017-11-24
Attending: FAMILY MEDICINE
Payer: MEDICARE

## 2017-11-24 PROBLEM — G45.9 TIA (TRANSIENT ISCHEMIC ATTACK): Status: ACTIVE | Noted: 2017-11-23

## 2017-11-24 PROBLEM — G45.1 HEMISPHERIC CAROTID ARTERY SYNDROME: Status: ACTIVE | Noted: 2017-11-23

## 2017-11-24 LAB
ALBUMIN SERPL-MCNC: 2.3 G/DL (ref 3.5–5)
ALBUMIN/GLOB SERPL: 0.6 {RATIO} (ref 1.1–2.2)
ALP SERPL-CCNC: 91 U/L (ref 45–117)
ALT SERPL-CCNC: 15 U/L (ref 12–78)
ANION GAP SERPL CALC-SCNC: 10 MMOL/L (ref 5–15)
ANION GAP SERPL CALC-SCNC: 5 MMOL/L (ref 5–15)
APPEARANCE UR: CLEAR
AST SERPL-CCNC: 18 U/L (ref 15–37)
ATRIAL RATE: 101 BPM
BACTERIA URNS QL MICRO: NEGATIVE /HPF
BASOPHILS # BLD: 0 K/UL (ref 0–0.1)
BASOPHILS NFR BLD: 0 % (ref 0–1)
BILIRUB SERPL-MCNC: 0.8 MG/DL (ref 0.2–1)
BILIRUB UR QL: NEGATIVE
BUN SERPL-MCNC: 11 MG/DL (ref 6–20)
BUN SERPL-MCNC: 8 MG/DL (ref 6–20)
BUN/CREAT SERPL: 13 (ref 12–20)
BUN/CREAT SERPL: 9 (ref 12–20)
CALCIUM SERPL-MCNC: 8.6 MG/DL (ref 8.5–10.1)
CALCIUM SERPL-MCNC: 8.9 MG/DL (ref 8.5–10.1)
CALCULATED P AXIS, ECG09: 33 DEGREES
CALCULATED R AXIS, ECG10: -88 DEGREES
CALCULATED T AXIS, ECG11: 73 DEGREES
CHLORIDE SERPL-SCNC: 106 MMOL/L (ref 97–108)
CHLORIDE SERPL-SCNC: 106 MMOL/L (ref 97–108)
CO2 SERPL-SCNC: 24 MMOL/L (ref 21–32)
CO2 SERPL-SCNC: 27 MMOL/L (ref 21–32)
COLOR UR: ABNORMAL
CREAT SERPL-MCNC: 0.83 MG/DL (ref 0.55–1.02)
CREAT SERPL-MCNC: 0.89 MG/DL (ref 0.55–1.02)
DIAGNOSIS, 93000: NORMAL
EOSINOPHIL # BLD: 0.1 K/UL (ref 0–0.4)
EOSINOPHIL NFR BLD: 1 % (ref 0–7)
EPITH CASTS URNS QL MICRO: ABNORMAL /LPF
ERYTHROCYTE [DISTWIDTH] IN BLOOD BY AUTOMATED COUNT: 13.6 % (ref 11.5–14.5)
ERYTHROCYTE [DISTWIDTH] IN BLOOD BY AUTOMATED COUNT: 13.8 % (ref 11.5–14.5)
GLOBULIN SER CALC-MCNC: 4.1 G/DL (ref 2–4)
GLUCOSE BLD STRIP.AUTO-MCNC: 116 MG/DL (ref 65–100)
GLUCOSE BLD STRIP.AUTO-MCNC: 119 MG/DL (ref 65–100)
GLUCOSE BLD STRIP.AUTO-MCNC: 155 MG/DL (ref 65–100)
GLUCOSE BLD STRIP.AUTO-MCNC: 210 MG/DL (ref 65–100)
GLUCOSE SERPL-MCNC: 120 MG/DL (ref 65–100)
GLUCOSE SERPL-MCNC: 139 MG/DL (ref 65–100)
GLUCOSE UR STRIP.AUTO-MCNC: NEGATIVE MG/DL
HCT VFR BLD AUTO: 33.9 % (ref 35–47)
HCT VFR BLD AUTO: 39.1 % (ref 35–47)
HGB BLD-MCNC: 10.7 G/DL (ref 11.5–16)
HGB BLD-MCNC: 12.4 G/DL (ref 11.5–16)
HGB UR QL STRIP: ABNORMAL
HYALINE CASTS URNS QL MICRO: ABNORMAL /LPF (ref 0–5)
KETONES UR QL STRIP.AUTO: NEGATIVE MG/DL
LACTATE SERPL-SCNC: 1.5 MMOL/L (ref 0.4–2)
LEUKOCYTE ESTERASE UR QL STRIP.AUTO: ABNORMAL
LYMPHOCYTES # BLD: 4.7 K/UL (ref 0.8–3.5)
LYMPHOCYTES NFR BLD: 26 % (ref 12–49)
MCH RBC QN AUTO: 29.2 PG (ref 26–34)
MCH RBC QN AUTO: 29.2 PG (ref 26–34)
MCHC RBC AUTO-ENTMCNC: 31.6 G/DL (ref 30–36.5)
MCHC RBC AUTO-ENTMCNC: 31.7 G/DL (ref 30–36.5)
MCV RBC AUTO: 92.2 FL (ref 80–99)
MCV RBC AUTO: 92.4 FL (ref 80–99)
MONOCYTES # BLD: 1.9 K/UL (ref 0–1)
MONOCYTES NFR BLD: 10 % (ref 5–13)
NEUTS SEG # BLD: 11.3 K/UL (ref 1.8–8)
NEUTS SEG NFR BLD: 63 % (ref 32–75)
NITRITE UR QL STRIP.AUTO: NEGATIVE
P-R INTERVAL, ECG05: 164 MS
PH UR STRIP: 7.5 [PH] (ref 5–8)
PLATELET # BLD AUTO: 268 K/UL (ref 150–400)
PLATELET # BLD AUTO: 271 K/UL (ref 150–400)
POTASSIUM SERPL-SCNC: 3.9 MMOL/L (ref 3.5–5.1)
POTASSIUM SERPL-SCNC: 3.9 MMOL/L (ref 3.5–5.1)
PROT SERPL-MCNC: 6.4 G/DL (ref 6.4–8.2)
PROT UR STRIP-MCNC: NEGATIVE MG/DL
Q-T INTERVAL, ECG07: 370 MS
QRS DURATION, ECG06: 96 MS
QTC CALCULATION (BEZET), ECG08: 479 MS
RBC # BLD AUTO: 3.67 M/UL (ref 3.8–5.2)
RBC # BLD AUTO: 4.24 M/UL (ref 3.8–5.2)
RBC #/AREA URNS HPF: ABNORMAL /HPF (ref 0–5)
SERVICE CMNT-IMP: ABNORMAL
SODIUM SERPL-SCNC: 138 MMOL/L (ref 136–145)
SODIUM SERPL-SCNC: 140 MMOL/L (ref 136–145)
SP GR UR REFRACTOMETRY: 1.01 (ref 1–1.03)
TSH SERPL DL<=0.05 MIU/L-ACNC: 0.64 UIU/ML (ref 0.36–3.74)
UA: UC IF INDICATED,UAUC: ABNORMAL
UROBILINOGEN UR QL STRIP.AUTO: 1 EU/DL (ref 0.2–1)
VENTRICULAR RATE, ECG03: 101 BPM
WBC # BLD AUTO: 15.7 K/UL (ref 3.6–11)
WBC # BLD AUTO: 18 K/UL (ref 3.6–11)
WBC URNS QL MICRO: ABNORMAL /HPF (ref 0–4)

## 2017-11-24 PROCEDURE — G8996 SWALLOW CURRENT STATUS: HCPCS | Performed by: SPEECH-LANGUAGE PATHOLOGIST

## 2017-11-24 PROCEDURE — 74011250636 HC RX REV CODE- 250/636: Performed by: INTERNAL MEDICINE

## 2017-11-24 PROCEDURE — 87040 BLOOD CULTURE FOR BACTERIA: CPT | Performed by: INTERNAL MEDICINE

## 2017-11-24 PROCEDURE — 77030029684 HC NEB SM VOL KT MONA -A

## 2017-11-24 PROCEDURE — 93880 EXTRACRANIAL BILAT STUDY: CPT

## 2017-11-24 PROCEDURE — 74011250637 HC RX REV CODE- 250/637: Performed by: FAMILY MEDICINE

## 2017-11-24 PROCEDURE — 97165 OT EVAL LOW COMPLEX 30 MIN: CPT

## 2017-11-24 PROCEDURE — 74011000258 HC RX REV CODE- 258: Performed by: FAMILY MEDICINE

## 2017-11-24 PROCEDURE — G8997 SWALLOW GOAL STATUS: HCPCS | Performed by: SPEECH-LANGUAGE PATHOLOGIST

## 2017-11-24 PROCEDURE — 83605 ASSAY OF LACTIC ACID: CPT | Performed by: INTERNAL MEDICINE

## 2017-11-24 PROCEDURE — 84443 ASSAY THYROID STIM HORMONE: CPT | Performed by: FAMILY MEDICINE

## 2017-11-24 PROCEDURE — 80053 COMPREHEN METABOLIC PANEL: CPT | Performed by: INTERNAL MEDICINE

## 2017-11-24 PROCEDURE — 65660000000 HC RM CCU STEPDOWN

## 2017-11-24 PROCEDURE — G8998 SWALLOW D/C STATUS: HCPCS | Performed by: SPEECH-LANGUAGE PATHOLOGIST

## 2017-11-24 PROCEDURE — 74011250636 HC RX REV CODE- 250/636: Performed by: PSYCHIATRY & NEUROLOGY

## 2017-11-24 PROCEDURE — 94640 AIRWAY INHALATION TREATMENT: CPT

## 2017-11-24 PROCEDURE — 85027 COMPLETE CBC AUTOMATED: CPT | Performed by: INTERNAL MEDICINE

## 2017-11-24 PROCEDURE — 82962 GLUCOSE BLOOD TEST: CPT

## 2017-11-24 PROCEDURE — G8988 SELF CARE GOAL STATUS: HCPCS

## 2017-11-24 PROCEDURE — G8987 SELF CARE CURRENT STATUS: HCPCS

## 2017-11-24 PROCEDURE — 74011000250 HC RX REV CODE- 250: Performed by: FAMILY MEDICINE

## 2017-11-24 PROCEDURE — 74011636637 HC RX REV CODE- 636/637: Performed by: FAMILY MEDICINE

## 2017-11-24 PROCEDURE — 85025 COMPLETE CBC W/AUTO DIFF WBC: CPT | Performed by: FAMILY MEDICINE

## 2017-11-24 PROCEDURE — 36415 COLL VENOUS BLD VENIPUNCTURE: CPT | Performed by: FAMILY MEDICINE

## 2017-11-24 PROCEDURE — 92610 EVALUATE SWALLOWING FUNCTION: CPT | Performed by: SPEECH-LANGUAGE PATHOLOGIST

## 2017-11-24 PROCEDURE — 81001 URINALYSIS AUTO W/SCOPE: CPT | Performed by: INTERNAL MEDICINE

## 2017-11-24 PROCEDURE — 97535 SELF CARE MNGMENT TRAINING: CPT

## 2017-11-24 PROCEDURE — G8989 SELF CARE D/C STATUS: HCPCS

## 2017-11-24 PROCEDURE — 80048 BASIC METABOLIC PNL TOTAL CA: CPT | Performed by: FAMILY MEDICINE

## 2017-11-24 RX ORDER — GUAIFENESIN 100 MG/5ML
81 LIQUID (ML) ORAL DAILY
Status: DISCONTINUED | OUTPATIENT
Start: 2017-11-25 | End: 2017-12-05 | Stop reason: HOSPADM

## 2017-11-24 RX ORDER — POLYETHYLENE GLYCOL 3350 17 G/17G
17 POWDER, FOR SOLUTION ORAL DAILY PRN
Status: DISCONTINUED | OUTPATIENT
Start: 2017-11-24 | End: 2017-12-05 | Stop reason: HOSPADM

## 2017-11-24 RX ORDER — LEVOFLOXACIN 5 MG/ML
750 INJECTION, SOLUTION INTRAVENOUS EVERY 24 HOURS
Status: DISCONTINUED | OUTPATIENT
Start: 2017-11-24 | End: 2017-11-28

## 2017-11-24 RX ORDER — VANCOMYCIN 1.75 GRAM/500 ML IN 0.9 % SODIUM CHLORIDE INTRAVENOUS
1750 ONCE
Status: COMPLETED | OUTPATIENT
Start: 2017-11-24 | End: 2017-11-24

## 2017-11-24 RX ORDER — VANCOMYCIN HYDROCHLORIDE
1250 EVERY 24 HOURS
Status: DISCONTINUED | OUTPATIENT
Start: 2017-11-25 | End: 2017-11-27

## 2017-11-24 RX ADMIN — LEVOFLOXACIN 750 MG: 5 INJECTION, SOLUTION INTRAVENOUS at 17:39

## 2017-11-24 RX ADMIN — NYSTATIN: 100000 POWDER TOPICAL at 17:39

## 2017-11-24 RX ADMIN — PANTOPRAZOLE SODIUM 40 MG: 40 TABLET, DELAYED RELEASE ORAL at 10:37

## 2017-11-24 RX ADMIN — LEVOTHYROXINE SODIUM 50 MCG: 50 TABLET ORAL at 06:49

## 2017-11-24 RX ADMIN — DEXTROSE MONOHYDRATE AND SODIUM CHLORIDE 100 ML/HR: 5; .45 INJECTION, SOLUTION INTRAVENOUS at 17:38

## 2017-11-24 RX ADMIN — METRONIDAZOLE 500 MG: 500 INJECTION, SOLUTION INTRAVENOUS at 06:49

## 2017-11-24 RX ADMIN — Medication 1 CAPSULE: at 10:37

## 2017-11-24 RX ADMIN — VANCOMYCIN HYDROCHLORIDE 1750 MG: 10 INJECTION, POWDER, LYOPHILIZED, FOR SOLUTION INTRAVENOUS at 21:14

## 2017-11-24 RX ADMIN — ACETAMINOPHEN 650 MG: 325 TABLET, FILM COATED ORAL at 02:46

## 2017-11-24 RX ADMIN — DEXTROSE MONOHYDRATE AND SODIUM CHLORIDE 100 ML/HR: 5; .45 INJECTION, SOLUTION INTRAVENOUS at 06:49

## 2017-11-24 RX ADMIN — PROPRANOLOL HYDROCHLORIDE 10 MG: 10 TABLET ORAL at 10:37

## 2017-11-24 RX ADMIN — GUAIFENESIN 1200 MG: 600 TABLET, EXTENDED RELEASE ORAL at 10:37

## 2017-11-24 RX ADMIN — POLYETHYLENE GLYCOL 3350 17 G: 17 POWDER, FOR SOLUTION ORAL at 10:37

## 2017-11-24 RX ADMIN — GUAIFENESIN 1200 MG: 600 TABLET, EXTENDED RELEASE ORAL at 17:02

## 2017-11-24 RX ADMIN — METRONIDAZOLE 500 MG: 500 INJECTION, SOLUTION INTRAVENOUS at 16:23

## 2017-11-24 RX ADMIN — ATORVASTATIN CALCIUM 20 MG: 20 TABLET, FILM COATED ORAL at 21:14

## 2017-11-24 RX ADMIN — MIRTAZAPINE 7.5 MG: 15 TABLET, FILM COATED ORAL at 21:14

## 2017-11-24 RX ADMIN — ACETAMINOPHEN 650 MG: 325 TABLET, FILM COATED ORAL at 19:01

## 2017-11-24 RX ADMIN — METRONIDAZOLE 500 MG: 500 INJECTION, SOLUTION INTRAVENOUS at 23:30

## 2017-11-24 RX ADMIN — IPRATROPIUM BROMIDE AND ALBUTEROL SULFATE 3 ML: .5; 3 SOLUTION RESPIRATORY (INHALATION) at 04:12

## 2017-11-24 RX ADMIN — INSULIN LISPRO 2 UNITS: 100 INJECTION, SOLUTION INTRAVENOUS; SUBCUTANEOUS at 16:50

## 2017-11-24 RX ADMIN — INSULIN LISPRO 3 UNITS: 100 INJECTION, SOLUTION INTRAVENOUS; SUBCUTANEOUS at 13:22

## 2017-11-24 NOTE — PROGRESS NOTES
Noted pt's admission yesterday. Chart reviewed for transitions of care needs. Pt has lived at [address] [place] [address] since April 2016 and at baseline per PT is a dependent and max assist of 1 for all mobility. She appears to be functionally at baseline and requires assistance with all aDL's and IADL's. Expected discharge would be to return to [address] [place] [address] at hospital discharge, facility address is [address] [place] [address], phone [phone], Ms. Marquez Share is SW.  CM called facility to confirm coordination of care needs. Expect pt would need stretcher to return to facility. Left message with Valery SPEARS. Please consult over w/e if pt medically ready for discharge. RAMIN Michaels    Care Management Interventions  PCP Verified by CM:  Yes  Current Support Network: Nursing Facility ([address] [place] [address] since April 2016)  Confirm Follow Up Transport: Other (see comment) (likely will need stretcher transport)  Discharge Location  Discharge Placement: Skilled nursing facility

## 2017-11-24 NOTE — PROGRESS NOTES
Bedside and Verbal shift change report given to [facility] (oncoming nurse) by Myah Langston (offgoing nurse). Report included the following information SBAR, Kardex, MAR and Recent Results.

## 2017-11-24 NOTE — PROGRESS NOTES
Hospitalist Progress Note  Moise Craig MD  Answering service: [phone] OR [phone] from in house phone      Date of Service:  2017  NAME:  Sandra Colón  :  1930  MRN:  [id number]      Admission Summary:   79 yo woman with obesity, bipolar disorder, COPD, HLD, HTN, and reported left ovarian cyst was BIBEMS to the ED from [facility] on 17 with sudden onset slurred speech and difficulty swallowing. She presented under Code Stroke. Facility reportedly had C diff outbreak, and patient had diarrhea that tested C diff negative. She was admitted to [facility] for dysarthria. Interval history / Subjective:      C/o pain in the abdomen, hypersensitivity;  Tmax 101; seen with nurse; son at bedside    Assessment & Plan:     Sepsis likely due to UTI (POA)  - tachycardia, fever, leucocytosis, tachypnea on admission  - lactic acid WNL  - check BCx, UA/C&S  - CXR  clear  - already started on levofloxacin and Flagyl  for presume infectious diarrhea    Dysarthria   - resolved PTA  - CT head wo contrast  No acute findings and no change since 2016.  - TTE  EF 60-65%, no RWMA  - b/l carotid duplex  unremarkable  - Neuro following  - MRI brain ordered  - start ASA 81mg daily  - regular diet per SLP and no further SLP needs    Left ovarian cyst (POA) - patient/family awake, no further workup    Diarrhea  - none since presentation; reportedly negative C diff at snf  - empiric levofloxacin, Flagyl started ; de-escalate as indicated  - continue probiotic    COPD  - no evidence of chronic respiratory failure; patient is not on home O2 per son  - no acute exacerbation at this time  - son reports patient takes nebs q4h while awake but at this time nebs prn are sufficient    HTN - controlled at this time    Bipolar disorder - controlled on home meds    Rheumatoid arthritis - continue prednisone and methotrexate    DM2 - controlled, metformin on hold; FBS, SSI    Hypothyroidism - TSH low-normal; check T4, T3; continue levothyroxine    Obesity, Body mass index is 31.4 kg/(m^2). Code status: full  DVT prophylaxis: SCDs    Care Plan discussed with: Patient/Family, Nurse and   Disposition: TBD. [facility] from [facility] Problems  Date Reviewed: 11/24/2017          Codes Class Noted POA    * (Principal)Hemispheric carotid artery syndrome ICD-10-CM: G45.1  ICD-9-CM: 435.8  11/23/2017 Yes            Review of Systems:   Limited ROS due to patient's mental status but as per subjective     Vital Signs:    Last 24hrs VS reviewed since prior progress note.  Most recent are:  Visit Vitals    /59    Pulse 86    Temp 98.8 °F (37.1 °C)    Resp 28    Ht 5' 5\" (1.651 m)    Wt 85.6 kg (188 lb 11.4 oz)    SpO2 96%    BMI 31.4 kg/m2     No intake or output data in the 24 hours ending 11/24/17 1431     Physical Examination:     Constitutional:  awake, no acute distress, obese   ENT:  oral mucosa moist, oropharynx benign  Neck supple, no masses   Resp:  CTA bilaterally anteriorly, no wheezing/rhonchi/rales   CV:  regular rhythm, normal rate, no m/r/g appreciated, no peripheral edema, +pulses    GI:  +BS, soft, non distended, diffusely tender due to hypersensitivity, obese     Musculoskeletal:  moves all extremities    Neurologic:  AOx2 to person and place (baseline), mildly dysarthric, follows commands     Skin:  warm, dry  Eyes:  PERRL    Data Review:    Review and/or order of clinical lab test  Review and/or order of tests in the radiology section of CPT  Review and/or order of tests in the medicine section of CPT    Labs:     Recent Labs      11/24/17   0305  11/23/17   1244   WBC  18.0*  20.3*   HGB  12.4  12.6   HCT  39.1  39.2   PLT  268  238     Recent Labs      11/24/17   0305  11/23/17   1244   NA  140  138   K  3.9  4.5   CL  106  104   CO2  24  27   BUN  11  13   CREA  0.83 0.90   GLU  120*  131*   CA  8.9  9.0     Recent Labs      11/23/17   1244   SGOT  18   ALT  14   AP  88   TBILI  1.5*   TP  6.9   ALB  2.7*   GLOB  4.2*     Recent Labs      11/23/17   1234   INR  1.5*      No results for input(s): FE, TIBC, PSAT, FERR in the last 72 hours. No results found for: FOL, RBCF   No results for input(s): PH, PCO2, PO2 in the last 72 hours. No results for input(s): CPK, CKNDX, TROIQ in the last 72 hours.     No lab exists for component: CPKMB  No results found for: CHOL, CHOLX, CHLST, CHOLV, HDL, LDL, LDLC, DLDLP, TGLX, TRIGL, TRIGP, CHHD, CHHDX  Lab Results   Component Value Date/Time    Glucose (POC) 210 11/24/2017 12:14 PM    Glucose (POC) 119 11/24/2017 07:43 AM    Glucose (POC) 140 11/23/2017 09:23 PM    Glucose (POC) 101 11/23/2017 05:10 PM    Glucose (POC) 152 11/23/2017 12:23 PM     Lab Results   Component Value Date/Time    Color YELLOW/STRAW 04/07/2016 11:05 AM    Appearance CLEAR 04/07/2016 11:05 AM    Specific gravity 1.016 04/07/2016 11:05 AM    pH (UA) 6.0 04/07/2016 11:05 AM    Protein NEGATIVE  04/07/2016 11:05 AM    Glucose NEGATIVE  04/07/2016 11:05 AM    Ketone NEGATIVE  04/07/2016 11:05 AM    Bilirubin NEGATIVE  04/07/2016 11:05 AM    Urobilinogen 0.2 04/07/2016 11:05 AM    Nitrites NEGATIVE  04/07/2016 11:05 AM    Leukocyte Esterase MODERATE 04/07/2016 11:05 AM    Epithelial cells FEW 04/07/2016 11:05 AM    Bacteria NEGATIVE  04/07/2016 11:05 AM    WBC 20-50 04/07/2016 11:05 AM    RBC 0-5 04/07/2016 11:05 AM         Medications Reviewed:     Current Facility-Administered Medications   Medication Dose Route Frequency    levoFLOXacin (LEVAQUIN) 750 mg in D5W IVPB  750 mg IntraVENous Q24H    metroNIDAZOLE (FLAGYL) IVPB premix 500 mg  500 mg IntraVENous Q8H    acetaminophen (TYLENOL) tablet 650 mg  650 mg Oral Q6H PRN    albuterol-ipratropium (DUO-NEB) 2.5 MG-0.5 MG/3 ML  3 mL Nebulization Q6H PRN    atorvastatin (LIPITOR) tablet 20 mg  20 mg Oral QHS    guaiFENesin ER (MUCINEX) tablet 1,200 mg  1,200 mg Oral BID    lactobac ac& pc-s.therm-b.anim (COBY Q/RISAQUAD)  1 Cap Oral DAILY    levothyroxine (SYNTHROID) tablet 50 mcg  50 mcg Oral ACB    mirtazapine (REMERON) tablet 7.5 mg  7.5 mg Oral QHS    nystatin (MYCOSTATIN) 100,000 unit/gram powder   Topical BID    pantoprazole (PROTONIX) tablet 40 mg  40 mg Oral DAILY    polyethylene glycol (MIRALAX) packet 17 g  17 g Oral DAILY    propranolol (INDERAL) tablet 10 mg  10 mg Oral DAILY    dextrose 5 % - 0.45% NaCl infusion  100 mL/hr IntraVENous CONTINUOUS    insulin lispro (HUMALOG) injection   SubCUTAneous AC&HS    glucose chewable tablet 16 g  4 Tab Oral PRN    dextrose (D50W) injection syrg 12.5-25 g  12.5-25 g IntraVENous PRN    glucagon (GLUCAGEN) injection 1 mg  1 mg IntraMUSCular PRN     ______________________________________________________________________  EXPECTED LENGTH OF STAY: 2d 2h  ACTUAL LENGTH OF STAY:          1                 Debbie Avalos MD

## 2017-11-24 NOTE — PROGRESS NOTES
Bedside and Verbal shift change report given to Kizzy Metcalf (oncoming nurse) by Mariza De Santiago (offgoing nurse). Report included the following information SBAR, Kardex, Intake/Output, MAR and Recent Results.

## 2017-11-24 NOTE — PROGRESS NOTES
Speech Pathology bedside swallow evaluation/discharge  Patient: Willie Adamson (67 y.o. female)  Date: 11/24/2017  Primary Diagnosis: CVA (cerebral vascular accident) [facility])        Precautions: fall       ASSESSMENT :  Based on the objective data described below, the patient presents with no oral or pharyngeal dysphagia. Timely and complete mastication, timely swallow initiation and functional hyolaryngeal elevation/excursion via palpation. No s/s of aspiration observed. Skilled therapy provided by a speech-language pathologist is not indicated at this time. PLAN :  Recommendations:  --continue regular diet. No further SLP needs at this time. Discharge Recommendations: None     SUBJECTIVE:   Patient stated I'm busy eating this two day old tray!  patient alert, confused, irritable. Needed encouragement to participate     OBJECTIVE:     Past Medical History:   Diagnosis Date    CAD (coronary artery disease)     COPD [facility]ch[facility] obstructive [facility] disease) ([facility] [address]     Hypercholesteremia     Hypertension     Ill-defined condition     mass on ovaries    Psychiatric disorder     bipolar   No past surgical history on file.   Prior Level of Function/Home Situation:   Home Situation  Home Environment: Skilled nursing facility  One/Two Story Residence: Two story  Living Alone: No  Support Systems: Child(desi)  Patient Expects to be Discharged to[de-identified] Assisted living  Current DME Used/Available at Home: Wheelchair  Diet prior to admission: regular  Current Diet:  regular   Cognitive and Communication Status:  Neurologic State: Alert, Irritable  Orientation Level: Oriented to person, Oriented to place, Disoriented to situation, Disoriented to time  Cognition: Decreased attention/concentration, Follows commands  Perception: Appears intact  Perseveration: No perseveration noted  Safety/Judgement: Not assessed  Oral Assessment:  Oral Assessment  Labial: No impairment  Oral Hygiene: moist mucosa   Lingual: No impairment  Velum: Unable to visualize  Mandible: No impairment  P.O. Trials:  Patient Position: upright in bed   Vocal quality prior to P.O.: No impairment  Consistency Presented: Thin liquid; Solid (breakfast tray )  How Presented: Self-fed/presented;Straw;Successive swallows (fork )     Bolus Acceptance: No impairment  Bolus Formation/Control: No impairment     Propulsion: No impairment  Oral Residue: None  Initiation of Swallow: No impairment  Laryngeal Elevation: Functional  Aspiration Signs/Symptoms: None  Pharyngeal Phase Characteristics: No impairment, issues, or problems              Oral Phase Severity: No impairment  Pharyngeal Phase Severity : No impairment      G Codes: In compliance with CMSs Claims Based Outcome Reporting, the following G-code set was chosen for this patient based the use of the NOMS functional outcome to quantify this patient's level of swallowing impairment. Using the NOMS, the patient was determined to be at level 7 for swallow function which correlates with the CH= 0% level of severity. Based on the objective assessment provided within this note, the current, goal, and discharge g-codes are as follows:    Swallow  Swallowing:   Swallow Current Status CH= 0%   Swallow Goal Status CH= 0%   Swallow D/C Status CH= 0%        NOMS Swallowing Levels:  Level 1 (CN): NPO  Level 2 (CM): NPO but takes consistency in therapy  Level 3 (CL): Takes less than 50% of nutrition p.o. and continues with nonoral feedings; and/or safe with mod cues; and/or max diet restriction  Level 4 (CK): Safe swallow but needs mod cues; and/or mod diet restriction; and/or still requires some nonoral feeding/supplements  Level 5 (CJ): Safe swallow with min diet restriction; and/or needs min cues  Level 6 (CI): Independent with p.o.; rare cues; usually self cues; may need to avoid some foods or needs extra time  Level 7 ([address] [place] [address]): Independent for all p.o.  KOREY. (2003).  National Outcomes Measurement System (NOMS): Adult Speech-Language Pathology User's Guide. Pain:Pain Scale 1: Numeric (0 - 10)  Pain Intensity 1: 0     After treatment:   [] Patient left in no apparent distress sitting up in chair  [x] Patient left in no apparent distress in bed  [x] Call bell left within reach  [x] Nursing notified  [x] Caregiver present  [] Bed alarm activated    COMMUNICATION/EDUCATION:   The patients plan of care including findings, recommendations, and recommended diet changes were discussed with: Physical Therapist, Occupational Therapist and Registered Nurse. [x] Patient/family have participated as able and agree with findings and recommendations. [] Patient is unable to participate in plan of care at this time. Thank you for this referral.  Gerry Bravo M.CD.  CCC-SLP   Time Calculation: 12 mins

## 2017-11-24 NOTE — PROCEDURES
[facility]  *** FINAL REPORT ***    Name: Mayra Vance  MRN: [id number]    Inpatient  : 17 Sep 1930  HIS Order #: [id number]  [id number] [facility] Visit #: [id number]  Date: 2017    TYPE OF TEST: Cerebrovascular Duplex    REASON FOR TEST  Transient ischemic attacks, focal neuro deficit    Right Carotid:-             Proximal               Mid                 Distal  cm/s  Systolic  Diastolic  Systolic  Diastolic  Systolic  Diastolic  CCA:     69.0      12.0                            86.0      12.0  Bulb:  ECA:     74.0  ICA:     62.0      15.0                            52.0      17.0  ICA/CCA:  0.7       1.3    ICA Stenosis:    Right Vertebral:-  Finding: Antegrade  Sys:       50.0  Deedee:    Right Subclavian:    Left Carotid:-            Proximal                Mid                 Distal  cm/s  Systolic  Diastolic  Systolic  Diastolic  Systolic  Diastolic  CCA:     62.9      14.0                            96.0      14.0  Bulb:  ECA:     91.0  ICA:    111.0      17.0                            69.0      17.0  ICA/CCA:  1.2       1.2    ICA Stenosis:    Left Vertebral:-  Finding: Not visualized  Sys:  Deedee:    Left Subclavian:    INTERPRETATION/FINDINGS  PROCEDURE:  Color duplex ultrasound imaging of extracranial  cerebrovascular arteries. FINDINGS:       Right:  Difficult study to due inadequate patient positioning and   audible breathing throughout exam.  Internal carotid velocity is  within normal limits. There appears to be narrowing of the internal  carotid flow channel on color Doppler imaging and mixed density plaque   on B-mode imaging, consistent with less than 50 percent stenosis. The common and external carotid arteries are patent and without  evidence of hemodynamically significant stenosis. Left:  Internal carotid velocity is within normal limits.   There  is narrowing of the internal carotid flow channel on color Doppler  imaging and mixed density plaque on B-mode imaging, consistent with  less than 50 percent stenosis. The common and external carotid  arteries are patent and without evidence of hemodynamically  significant stenosis. IMPRESSION:  Consistent with less than 50% stenosis of the internal  carotids. Right vertebral appears patent with antegrade flow where  seen. The left vertebral was not visualized. ADDITIONAL COMMENTS    I have personally reviewed the data relevant to the interpretation of  this  study.     TECHNOLOGIST: Sofia Morel RVT  Signed: 11/24/2017 09:30 AM    PHYSICIAN: Maple Dubin., MD  Signed: 11/26/2017 01:33 PM

## 2017-11-24 NOTE — PROGRESS NOTES
Problem: TIA/CVA Stroke: 0-24 hours  Goal: *Stroke education started(Stroke Metric)  Outcome: Progressing Towards Goal  Educated patient's family on testing scheduled for patient and how that pertains to the stroke workup.

## 2017-11-24 NOTE — CDMP QUERY
Patient is noted to have a BMI of 31.4 kg/m 2  Please clarify if this patient is:     =>Obesity (BMI of 30-39.9)  => Morbid Obesity  (BMI 40 or greater)  =>Overweight (BMI 25-29.9)  => Other weight status (specify  status)  => Unable to determine    The [address] [place] [address] has issued a statement indicating that, \"Individuals who are overweight, obese, or morbidly obese are at an increased risk for certain medical conditions when compared to persons of normal weight.  Therefore, these conditions are always clinically significant and reportable when documented by the provider. \"      Presentation:  Ht: 5' 5\" (1.651 m)  Wt: 85.6 kg (188 lb 11.4 oz)      Please clarify and document your clinical opinion in the progress notes and discharge summary, including the definitive and or presumptive diagnosis, (suspected or probable), related to the above clinical findings. Please include clinical findings supporting your diagnosis.        Thank you,         Waleska Priest [address] [place] [address]

## 2017-11-24 NOTE — PROGRESS NOTES
Primary Nurse Abdi Miranda RN and Lara Padilla RN performed a dual skin assessment on this patient Impairment noted- see wound doc flow sheet  Ezio score is 17    Patient wheelchair bound at baseline. Patient's lower extremities flaky and dry. Patient's great toes red on the outer joint, as well as the second toes on each foot. Applied moisture cream to legs.

## 2017-11-24 NOTE — CONSULTS
NEUROLOGY  11/24/2017     Consulted by: Philippe Reed MD        Patient ID:  Renetta Braun  [id number]  80 y.o.  9/17/1930    Chief Complaint   Patient presents with    Dysarthria    Shortness of Breath       HPI    Ms. Herminio Neely is an 70-year-old woman with a reported history of dementia, CAD, hypertension, COPD who was brought to the hospital yesterday because her speech was incoherent which was a distinct change from her baseline. Medical record shows that for the past 2 days she has had some generalized weakness and diarrhea. She has an elevated white count currently. Unclear source. Her son is here and says she is mostly her baseline now. She cannot give me any details. She is asking when she can go home. Review of Systems   Unable to perform ROS: Dementia       Past Medical History:   Diagnosis Date    CAD (coronary artery disease)     [facility]) [facility] [address]     Hypercholesteremia     Hypertension     Ill-defined condition     mass on ovaries    Psychiatric disorder     bipolar     No family history on file. Social History     Social History    Marital status:      Spouse name: N/A    Number of children: N/A    Years of education: N/A     Occupational History    Not on file.      Social History Main Topics    Smoking status: Former Smoker    Smokeless tobacco: Not on file    Alcohol use No    Drug use: No    Sexual activity: Not Currently     Other Topics Concern    Not on file     Social History Narrative     Current Facility-Administered Medications   Medication Dose Route Frequency    metroNIDAZOLE (FLAGYL) IVPB premix 500 mg  500 mg IntraVENous Q8H    [START ON 11/25/2017] levoFLOXacin (LEVAQUIN) 750 mg in D5W IVPB  750 mg IntraVENous Q48H    acetaminophen (TYLENOL) tablet 650 mg  650 mg Oral Q6H PRN    albuterol-ipratropium (DUO-NEB) 2.5 MG-0.5 MG/3 ML  3 mL Nebulization Q6H PRN    atorvastatin (LIPITOR) tablet 20 mg  20 mg Oral QHS    guaiFENesin ER (MUCINEX) tablet 1,200 mg  1,200 mg Oral BID    lactobac ac& pc-s.therm-b.anim (COBY Q/RISAQUAD)  1 Cap Oral DAILY    levothyroxine (SYNTHROID) tablet 50 mcg  50 mcg Oral ACB    mirtazapine (REMERON) tablet 7.5 mg  7.5 mg Oral QHS    nystatin (MYCOSTATIN) 100,000 unit/gram powder   Topical BID    pantoprazole (PROTONIX) tablet 40 mg  40 mg Oral DAILY    polyethylene glycol (MIRALAX) packet 17 g  17 g Oral DAILY    propranolol (INDERAL) tablet 10 mg  10 mg Oral DAILY    dextrose 5 % - 0.45% NaCl infusion  100 mL/hr IntraVENous CONTINUOUS    insulin lispro (HUMALOG) injection   SubCUTAneous AC&HS    glucose chewable tablet 16 g  4 Tab Oral PRN    dextrose (D50W) injection syrg 12.5-25 g  12.5-25 g IntraVENous PRN    glucagon (GLUCAGEN) injection 1 mg  1 mg IntraMUSCular PRN     Allergies   Allergen Reactions    Enablex [Darifenacin] Rash       Visit Vitals    /56    Pulse 89    Temp 98.1 °F (36.7 °C)    Resp 20    Ht 5' 5\" (1.651 m)    Wt 85.6 kg (188 lb 11.4 oz)    SpO2 96%    BMI 31.4 kg/m2     Physical Exam   Constitutional: She appears well-developed and well-nourished. Cardiovascular: Normal rate. Pulmonary/Chest:   Labored breathing   Skin: Skin is warm and dry. Vitals reviewed. Neurologic Exam     Mental Status        Awake alert in bed. Talkative but breathless at the same time. Asking when she can go home. Her face is asymmetric to the right. Primary gaze is disconjugate but she denies double vision.   Tongue is midline  Speech is clear  She is globally weak throughout 4/5 in the upper extremities 3/5 in the lower extremities  No abnormal movements  Equivocal toes  Gait deferred            Lab Results  Component Value Date/Time   WBC 18.0 11/24/2017 03:05 AM   HGB 12.4 11/24/2017 03:05 AM   HCT 39.1 11/24/2017 03:05 AM   PLATELET 185 75/96/8821 03:05 AM   MCV 92.2 11/24/2017 03:05 AM     Lab Results  Component Value Date/Time   Hemoglobin A1c 6.2 02/22/2016 03:00 AM   Glucose 120 11/24/2017 03:05 AM   Glucose (POC) 119 11/24/2017 07:43 AM   Creatinine 0.83 11/24/2017 03:05 AM      No results found for: CHOL, CHOLPOCT, HDL, LDL, LDLC, LDLCPOC, LDLCEXT, TRIGL, TGLPOCT, CHHD, CHHDXLab Results  Component Value Date/Time   ALT (SGPT) 14 11/23/2017 12:44 PM   AST (SGOT) 18 11/23/2017 12:44 PM   Alk. phosphatase 88 11/23/2017 12:44 PM   Bilirubin, total 1.5 11/23/2017 12:44 PM   Albumin 2.7 11/23/2017 12:44 PM   Protein, total 6.9 11/23/2017 12:44 PM   Ammonia 12 02/21/2016 03:38 PM   INR (POC) 1.5 11/23/2017 12:34 PM   PLATELET 641 12/28/9100 03:05 AM       Lab Results  Component Value Date/Time   TSH 0.64 11/24/2017 03:05 AM                     CT Results (maximum last 3): Results from [place] encounter on 11/23/17   CT CODE NEURO HEAD WO CONTRAST   Narrative EXAM:  CT CODE NEURO HEAD WO CONTRAST    INDICATION:   code s    COMPARISON: 4/7/2016. CONTRAST:  None. TECHNIQUE: Unenhanced CT of the head was performed using 5 mm images. Brain and  bone windows were generated. CT dose reduction was achieved through use of a  standardized protocol tailored for this examination and automatic exposure  control for dose modulation. FINDINGS:  There is prominence of the lateral ventricles, unchanged, and likely due to  cerebral volume loss. There is not appear to be hydrocephalus. . There is no  significant white matter disease. There is no intracranial hemorrhage,  extra-axial collection, mass, mass effect or midline shift. The basilar  cisterns are open. No acute infarct is identified. The bone windows demonstrate  no abnormalities. The visualized portions of the paranasal sinuses and mastoid  air cells are clear. There is no hyperdense vessel sign. Impression IMPRESSION: No acute findings and no change since 4/7/2016.           Results from [place] encounter on 04/07/16   CT ABD PELV W CONT   Narrative **Final Report**      ICD Codes / Adm. Diagnosis: 59042779  625.8 / Hypotension  Hypotension  Examination:  CT ABD PELVIS W CON  - [id number] - Apr 7 2016  2:09PM  Accession No:  [id number]  Reason:  Infected / ruptured cyst?      REPORT:  CT ABDOMEN AND PELVIS WITH CONTRAST. 4/7/2016 1:15 PM     INDICATION: Infected/ruptured ovarian cyst. Altered mental status. COMPARISON: 2/27/2016, 2/21/2016. TECHNIQUE: CT of the abdomen and pelvis was performed after the   administration of 97 cc IV contrast (Isovue 370). FINDINGS:  Abdomen: A sub-4 mm right middle lobe pulmonary nodule is likely benign. The   lung bases are otherwise clear. The heart size is normal. Incidental note is   made of a tiny focus of fat in the pancreatic head (3-32, 601-73), left   renal cysts, and subcentimeter bilateral hypodense renal lesions which are   too small to characterize, but likely also represent cysts. Post   cholecystectomy. The distal esophagus, stomach, duodenum, liver, spleen, and   adrenals are normal.    Pelvis: Given its predominantly cystic nature, the large pelvic mass arising   from the left ovary probably represents a benign cystic ovarian neoplasm. A   small amount of soft tissue at its left superolateral aspect (3-53, 601-87)   probably represents residual ovarian parenchyma as opposed to mural   nodularity. It measures 11 x 29 x 21 mm. The engorged left gonadal vein   arises directly from it, confirming the ovarian origin of the mass. No other   areas concerning for mural nodularity or internal septation, though CT is   suboptimal for this assessment. The small bowel, ileocecal junction, appendix, colon, and bladder are   normal. No free air fluid, and no abdominopelvic lymphadenopathy. 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 Given the   predominantly simple fluid nature, this probably represents a benign ovarian   neoplasm. However, benignity cannot be determined by CT alone.  Gynecologic   oncologic consultation is recommended if the patient is a surgical candidate. The findings were called to Dr. Sherren Flasher on 4/7/2016 at 1655 hours by   Dr. Daniel Banks [id number]           Signing/Reading Doctor: Zoya Fierro ([id number])    Approved: Laurian Spray ([id number])  Apr 7 2016  4:56PM                                MRI Results (maximum last 3): No results found for this or any previous visit. VAS/US/Carotid Doppler Results (maximum last 3): Results from [place] encounter on 11/23/17   DUPLEX CAROTID BILATERAL    PET Results (maximum last 3): No results found for this or any previous visit. Assessment and Plan        80-year-old woman who presents with an acute change in speech by report. Could be TIA. This also could be induced in the setting of metabolic infectious issues as well. Agree with order for MRI brain to be done. I do not see aspirin on her prior to admission list that she should be on 81 mg at the minimum. Neurology will follow-up once the MRI is done. Continue supportive management addressing metabolic infectious issues.         2 [facility], DO  NEUROLOGIST  Diplomate ABPN  11/24/2017

## 2017-11-24 NOTE — PROGRESS NOTES
Orders received, chart reviewed and patient evaluated by occupational therapy. Recommend patient to discharge to [facility] at [facility] (has been living there since 4/15/16) when medically stable. No further acute OT needs. Recommend with nursing patient to complete as able in order to maintain strength, endurance and independence: bed/chair position with footboard on 3x/day for meals, bedpan for toileting, and up to Max A for ADLs. Patient able to successfully feed self when will to participate. Thank you for your assistance. Full evaluation to follow.      Cindy Ford OTR/L

## 2017-11-24 NOTE — PROGRESS NOTES
Occupational Therapy EVALUATION/discharge  Patient: Tasneem Best (65 y.o. female)  Date: 11/24/2017  Primary Diagnosis: CVA (cerebral vascular accident) [facility])        Precautions: Fall       ASSESSMENT:   Based on the objective data described below, the patient presents with overall Max A for w/c transfers, up to Total A for lower body ADLs, and independent-Max A for upper body ADLs. Patient is at her functional baseline, please defer to PLOF section below for history. Patient declining assist to transfer OOB (Max A x1 at baseline). Patient demonstrated independence with self feeding, but does fluctuate and report she \"can't do it\" although she is capable and prefers assist with ADLs. Patient without any acute OT needs as she is Total A at baseline. Will complete orders at this time. Further skilled acute occupational therapy is not indicated at this time. Discharge Recommendations: Return to Healthcare at [facility] (has been there since 4/15/2016)  Further Equipment Recommendations for Discharge: None noted      SUBJECTIVE:   Patient stated Are you crazy? ! I'm staying put and going to finish eating this two day old meal.    OBJECTIVE DATA SUMMARY:   HISTORY:   Past Medical History:   Diagnosis Date    CAD (coronary artery disease)     COPD (chronic obstructive pulmonary disease) ([facility] [address]     Hypercholesteremia     Hypertension     Ill-defined condition     mass on ovaries    Psychiatric disorder     bipolar   No past surgical history on file. Prior Level of Function/Environment/Context: This therapist called facility (healthcare at the [facility]) and spoke to medical staff. Patient requires \"extensive, maximal assist x1 for transfers to wheelchair\". Occasionally attempts to walk few steps with RW with restorative therapies. Patient requires Total A for all ADLs, can feed herself and apply makeup when she is in the mood.  Typically alert and oriented x4 but steadily becoming more confused on a regular basis. Patient is a poor historian (reporting she ambulates independently, does not use w/c, and completes all ADLs). Home Situation  Home Environment: Skilled nursing facility  One/Two Story Residence: Two story  Living Alone: No  Support Systems: Child(desi)  Patient Expects to be Discharged to[de-identified] Assisted living  Current DME Used/Available at Home: Wheelchair  []  Right hand dominant   []  Left hand dominant    EXAMINATION OF PERFORMANCE DEFICITS:  Cognitive/Behavioral Status:  Neurologic State: Alert;Irritable  Orientation Level: Oriented to person;Oriented to place; Disoriented to situation;Disoriented to time  Cognition: Decreased attention/concentration; Follows commands  Perception: Appears intact  Perseveration: No perseveration noted  Safety/Judgement: Not assessed    Skin: Appears intact    Edema: None noted in BUEs     Hearing: Auditory  Auditory Impairment: None    Vision/Perceptual:    Tracking: Able to track stimulus in all quadrants w/o difficulty                      Acuity: Within Defined Limits         Range of Motion:  AROM: Generally decreased, functional  PROM: Generally decreased, functional                      Strength:  Strength: Generally decreased, functional                Coordination:  Coordination: Generally decreased, functional  Fine Motor Skills-Upper: Left Intact; Right Intact    Gross Motor Skills-Upper: Left Intact; Right Intact    Tone & Sensation:  Tone: Normal  Sensation: Intact                      Balance:  Sitting: Impaired    Functional Mobility and Transfers for ADLs:  Bed Mobility:  Supine to Sit:  (bed/chair currently)    Transfers: Toilet Transfer : Total assistance (Unsafe for transfer OOB)    ADL Assessment:  Feeding: Minimum assistance (For container management and cutting food)    Oral Facial Hygiene/Grooming: Minimum assistance (For container management)    Bathing: Maximum assistance    Upper Body Dressing: Maximum assistance    Lower Body Dressing:  Total assistance    Toileting: Total assistance   * Inferred per report from facility of baseline ADLs, observations, patient cognition, strength, and motivation. ADL Intervention and task modifications:  Feeding  Container Management: Maximum assistance  Cutting Food: Maximum assistance  Food to Mouth: Supervision/set-up  Drink to Mouth: Modified independent    Cognitive Retraining  Safety/Judgement: Not assessed    Functional Measure:  Barthel Index:    Bathin  Bladder: 5  Bowels: 5  Groomin  Dressin  Feedin  Mobility: 0  Stairs: 0  Toilet Use: 0  Transfer (Bed to Chair and Back): 0  Total: 15       Barthel and G-code impairment scale:  Percentage of impairment CH  0% CI  1-19% CJ  20-39% CK  40-59% CL  60-79% CM  80-99% CN  100%   Barthel Score 0-100 100 99-80 79-60 59-40 20-39 1-19   0   Barthel Score 0-20 20 17-19 13-16 9-12 5-8 1-4 0      The Barthel ADL Index: Guidelines  1. The index should be used as a record of what a patient does, not as a record of what a patient could do. 2. The main aim is to establish degree of independence from any help, physical or verbal, however minor and for whatever reason. 3. The need for supervision renders the patient not independent. 4. A patient's performance should be established using the best available evidence. Asking the patient, friends/relatives and nurses are the usual sources, but direct observation and common sense are also important. However direct testing is not needed. 5. Usually the patient's performance over the preceding 24-48 hours is important, but occasionally longer periods will be relevant. 6. Middle categories imply that the patient supplies over 50 per cent of the effort. 7. Use of aids to be independent is allowed. Naty Benson., Barthel, DHadleyW. (3218). Functional evaluation: the Barthel Index. [facility] (14)2. UMA Lilly, Logan Thompson., Toshia Bustamante, [address] [place] [address] Ave ().  Measuring the change indisability after inpatient rehabilitation; comparison of the responsiveness of the Barthel Index and Functional [place] Measure. Journal of Neurology, Neurosurgery, and Psychiatry, 66(4), 401-609. MOISES Garcia.QUEENIE, ROSALINA Isidro, & Emily Mosquera M.A. (2004.) Assessment of post-stroke quality of life in cost-effectiveness studies: The usefulness of the Barthel Index and the EuroQoL-5D. Quality of Life Research, 13, 293-83         G codes: In compliance with CMSs Claims Based Outcome Reporting, the following G-code set was chosen for this patient based on their primary functional limitation being treated: The outcome measure chosen to determine the severity of the functional limitation was the Barthel Index with a score of 15/100 which was correlated with the impairment scale. ? Self Care:     - CURRENT STATUS: CM - 80%-99% impaired, limited or restricted    - GOAL STATUS: CM - 80%-99% impaired, limited or restricted    - D/C STATUS:  CM - 80%-99% impaired, limited or restricted       Occupational Therapy Evaluation Charge Determination   History Examination Decision-Making   LOW Complexity : Brief history review  HIGH Complexity : 5 or more performance deficits relating to physical, cognitive , or psychosocial skils that result in activity limitations and / or participation restrictions HIGH Complexity : Patient presents with comorbidities that affect occupational performance. Signifigant modification of tasks or assistance (eg, physical or verbal) with assessment (s) is necessary to enable patient to complete evaluation       Based on the above components, the patient evaluation is determined to be of the following complexity level: LOW   Pain:Pain Scale 1: Numeric (0 - 10)  Pain Intensity 1: 0              Activity Tolerance:   Poor. Please refer to the flowsheet for vital signs taken during this treatment.   After treatment:   []  Patient left in no apparent distress sitting up in chair  [x]  Patient left in no apparent distress in bed  [x]  Call bell left within reach  [x]  Nursing notified  [x]  Caregiver present  []  Bed alarm activated    COMMUNICATION/EDUCATION:   Communication/Collaboration:  [x]      Home safety education was provided and the patient/caregiver indicated understanding. [x]      Patient/family have participated as able and agree with findings and recommendations. []      Patient is unable to participate in plan of care at this time.   Findings and recommendations were discussed with: Physical Therapist and Registered Nurse    Pedro Matias OT  Time Calculation: 17 mins

## 2017-11-24 NOTE — PROGRESS NOTES
PT Note:    Orders received and acknowledged. Chart reviewed and attempted to see for PT evaluation. Patient adamantly refusing, multiple reasons given. Encouraged patient to attempt EOB mobility to assist in discharge planning-patient continuing to refuse stating \"You must be crazy! \"  Patient is a poor historian regarding her history. Call placed to [facility] where patient resides. Patient has been living in Healthcare at [facility] since April 2016, she requires \"heavy max A x 1\" for transfers but does ambulate short distances as part of \"restorative therapy\" at the facility. Unsure of how frequently she participates. Otherwise she is max/total A for ADL's. If patient requesting to be OOB to chair will require gopal lift. Recommend patient bed in chair position 3 x daily for all meals over the weekend. Anticipate patient is at baseline for mobility. Will follow up Monday for PT evaluation if patient remains admitted. Recommend return to prior level of care at [facility] at discharge.

## 2017-11-25 ENCOUNTER — APPOINTMENT (OUTPATIENT)
Dept: MRI IMAGING | Age: 82
DRG: 871 | End: 2017-11-25
Attending: FAMILY MEDICINE
Payer: MEDICARE

## 2017-11-25 LAB
25(OH)D3 SERPL-MCNC: 41.7 NG/ML (ref 30–100)
ANION GAP SERPL CALC-SCNC: 7 MMOL/L (ref 5–15)
BASOPHILS # BLD: 0 K/UL (ref 0–0.1)
BASOPHILS NFR BLD: 0 % (ref 0–1)
BUN SERPL-MCNC: 6 MG/DL (ref 6–20)
BUN/CREAT SERPL: 7 (ref 12–20)
CALCIUM SERPL-MCNC: 9.2 MG/DL (ref 8.5–10.1)
CHLORIDE SERPL-SCNC: 109 MMOL/L (ref 97–108)
CO2 SERPL-SCNC: 24 MMOL/L (ref 21–32)
CREAT SERPL-MCNC: 0.87 MG/DL (ref 0.55–1.02)
EOSINOPHIL # BLD: 0.2 K/UL (ref 0–0.4)
EOSINOPHIL NFR BLD: 1 % (ref 0–7)
ERYTHROCYTE [DISTWIDTH] IN BLOOD BY AUTOMATED COUNT: 13.7 % (ref 11.5–14.5)
GLUCOSE BLD STRIP.AUTO-MCNC: 138 MG/DL (ref 65–100)
GLUCOSE BLD STRIP.AUTO-MCNC: 143 MG/DL (ref 65–100)
GLUCOSE BLD STRIP.AUTO-MCNC: 144 MG/DL (ref 65–100)
GLUCOSE BLD STRIP.AUTO-MCNC: 156 MG/DL (ref 65–100)
GLUCOSE SERPL-MCNC: 137 MG/DL (ref 65–100)
HCT VFR BLD AUTO: 37.4 % (ref 35–47)
HGB BLD-MCNC: 11.9 G/DL (ref 11.5–16)
LYMPHOCYTES # BLD: 4.3 K/UL (ref 0.8–3.5)
LYMPHOCYTES NFR BLD: 27 % (ref 12–49)
MAGNESIUM SERPL-MCNC: 1.7 MG/DL (ref 1.6–2.4)
MCH RBC QN AUTO: 29.3 PG (ref 26–34)
MCHC RBC AUTO-ENTMCNC: 31.8 G/DL (ref 30–36.5)
MCV RBC AUTO: 92.1 FL (ref 80–99)
MONOCYTES # BLD: 1.5 K/UL (ref 0–1)
MONOCYTES NFR BLD: 10 % (ref 5–13)
NEUTS SEG # BLD: 9.8 K/UL (ref 1.8–8)
NEUTS SEG NFR BLD: 62 % (ref 32–75)
NRBC # BLD: 0 K/UL (ref 0–0.01)
NRBC BLD-RTO: 0 PER 100 WBC
PHOSPHATE SERPL-MCNC: 1.7 MG/DL (ref 2.6–4.7)
PLATELET # BLD AUTO: 271 K/UL (ref 150–400)
POTASSIUM SERPL-SCNC: 3.8 MMOL/L (ref 3.5–5.1)
RBC # BLD AUTO: 4.06 M/UL (ref 3.8–5.2)
SERVICE CMNT-IMP: ABNORMAL
SODIUM SERPL-SCNC: 140 MMOL/L (ref 136–145)
T4 FREE SERPL-MCNC: 1.5 NG/DL (ref 0.8–1.5)
TSH SERPL DL<=0.05 MIU/L-ACNC: 0.89 UIU/ML (ref 0.36–3.74)
VIT B12 SERPL-MCNC: 390 PG/ML (ref 211–911)
WBC # BLD AUTO: 15.5 K/UL (ref 3.6–11)

## 2017-11-25 PROCEDURE — 70551 MRI BRAIN STEM W/O DYE: CPT

## 2017-11-25 PROCEDURE — 65660000000 HC RM CCU STEPDOWN

## 2017-11-25 PROCEDURE — 83735 ASSAY OF MAGNESIUM: CPT | Performed by: INTERNAL MEDICINE

## 2017-11-25 PROCEDURE — 84439 ASSAY OF FREE THYROXINE: CPT | Performed by: INTERNAL MEDICINE

## 2017-11-25 PROCEDURE — 84443 ASSAY THYROID STIM HORMONE: CPT | Performed by: INTERNAL MEDICINE

## 2017-11-25 PROCEDURE — 82962 GLUCOSE BLOOD TEST: CPT

## 2017-11-25 PROCEDURE — 82306 VITAMIN D 25 HYDROXY: CPT | Performed by: INTERNAL MEDICINE

## 2017-11-25 PROCEDURE — 87186 SC STD MICRODIL/AGAR DIL: CPT | Performed by: INTERNAL MEDICINE

## 2017-11-25 PROCEDURE — 74011636637 HC RX REV CODE- 636/637: Performed by: FAMILY MEDICINE

## 2017-11-25 PROCEDURE — 85025 COMPLETE CBC W/AUTO DIFF WBC: CPT | Performed by: INTERNAL MEDICINE

## 2017-11-25 PROCEDURE — 74011250636 HC RX REV CODE- 250/636: Performed by: PSYCHIATRY & NEUROLOGY

## 2017-11-25 PROCEDURE — 94640 AIRWAY INHALATION TREATMENT: CPT

## 2017-11-25 PROCEDURE — 80048 BASIC METABOLIC PNL TOTAL CA: CPT | Performed by: INTERNAL MEDICINE

## 2017-11-25 PROCEDURE — 82607 VITAMIN B-12: CPT | Performed by: INTERNAL MEDICINE

## 2017-11-25 PROCEDURE — 74011250637 HC RX REV CODE- 250/637: Performed by: FAMILY MEDICINE

## 2017-11-25 PROCEDURE — 87077 CULTURE AEROBIC IDENTIFY: CPT | Performed by: INTERNAL MEDICINE

## 2017-11-25 PROCEDURE — 74011000258 HC RX REV CODE- 258: Performed by: FAMILY MEDICINE

## 2017-11-25 PROCEDURE — 74011250636 HC RX REV CODE- 250/636: Performed by: INTERNAL MEDICINE

## 2017-11-25 PROCEDURE — 87086 URINE CULTURE/COLONY COUNT: CPT | Performed by: INTERNAL MEDICINE

## 2017-11-25 PROCEDURE — 74011000250 HC RX REV CODE- 250: Performed by: FAMILY MEDICINE

## 2017-11-25 PROCEDURE — 36415 COLL VENOUS BLD VENIPUNCTURE: CPT | Performed by: INTERNAL MEDICINE

## 2017-11-25 PROCEDURE — 84480 ASSAY TRIIODOTHYRONINE (T3): CPT | Performed by: INTERNAL MEDICINE

## 2017-11-25 PROCEDURE — 74011250637 HC RX REV CODE- 250/637: Performed by: INTERNAL MEDICINE

## 2017-11-25 PROCEDURE — 84100 ASSAY OF PHOSPHORUS: CPT | Performed by: INTERNAL MEDICINE

## 2017-11-25 RX ADMIN — ASPIRIN 81 MG 81 MG: 81 TABLET ORAL at 08:11

## 2017-11-25 RX ADMIN — DEXTROSE MONOHYDRATE AND SODIUM CHLORIDE 100 ML/HR: 5; .45 INJECTION, SOLUTION INTRAVENOUS at 13:32

## 2017-11-25 RX ADMIN — INSULIN LISPRO 2 UNITS: 100 INJECTION, SOLUTION INTRAVENOUS; SUBCUTANEOUS at 17:24

## 2017-11-25 RX ADMIN — METRONIDAZOLE 500 MG: 500 INJECTION, SOLUTION INTRAVENOUS at 15:16

## 2017-11-25 RX ADMIN — Medication 1 CAPSULE: at 08:11

## 2017-11-25 RX ADMIN — PROPRANOLOL HYDROCHLORIDE 10 MG: 10 TABLET ORAL at 08:11

## 2017-11-25 RX ADMIN — LEVOTHYROXINE SODIUM 50 MCG: 50 TABLET ORAL at 07:11

## 2017-11-25 RX ADMIN — PANTOPRAZOLE SODIUM 40 MG: 40 TABLET, DELAYED RELEASE ORAL at 08:11

## 2017-11-25 RX ADMIN — INSULIN LISPRO 2 UNITS: 100 INJECTION, SOLUTION INTRAVENOUS; SUBCUTANEOUS at 12:52

## 2017-11-25 RX ADMIN — LEVOFLOXACIN 750 MG: 5 INJECTION, SOLUTION INTRAVENOUS at 18:02

## 2017-11-25 RX ADMIN — VANCOMYCIN HYDROCHLORIDE 1250 MG: 10 INJECTION, POWDER, LYOPHILIZED, FOR SOLUTION INTRAVENOUS at 22:43

## 2017-11-25 RX ADMIN — INSULIN LISPRO 2 UNITS: 100 INJECTION, SOLUTION INTRAVENOUS; SUBCUTANEOUS at 08:10

## 2017-11-25 RX ADMIN — IPRATROPIUM BROMIDE AND ALBUTEROL SULFATE 3 ML: .5; 3 SOLUTION RESPIRATORY (INHALATION) at 21:04

## 2017-11-25 RX ADMIN — NYSTATIN: 100000 POWDER TOPICAL at 08:15

## 2017-11-25 RX ADMIN — ATORVASTATIN CALCIUM 20 MG: 20 TABLET, FILM COATED ORAL at 22:44

## 2017-11-25 RX ADMIN — GUAIFENESIN 1200 MG: 600 TABLET, EXTENDED RELEASE ORAL at 17:25

## 2017-11-25 RX ADMIN — MIRTAZAPINE 7.5 MG: 15 TABLET, FILM COATED ORAL at 22:44

## 2017-11-25 RX ADMIN — GUAIFENESIN 1200 MG: 600 TABLET, EXTENDED RELEASE ORAL at 08:11

## 2017-11-25 RX ADMIN — METRONIDAZOLE 500 MG: 500 INJECTION, SOLUTION INTRAVENOUS at 07:11

## 2017-11-25 RX ADMIN — NYSTATIN: 100000 POWDER TOPICAL at 19:07

## 2017-11-25 NOTE — PROGRESS NOTES
Problem: Falls - Risk of  Goal: *Absence of Falls  Document Kristina Fall Risk and appropriate interventions in the flowsheet.    Outcome: Progressing Towards Goal  Fall Risk Interventions:  Mobility Interventions: Communicate number of staff needed for ambulation/transfer, Patient to call before getting OOB, PT Consult for mobility concerns, PT Consult for assist device competence, Strengthening exercises (ROM-active/passive)    Mentation Interventions: Adequate sleep, hydration, pain control, Door open when patient unattended, Evaluate medications/consider consulting pharmacy, Increase mobility, More frequent rounding, Reorient patient, Room close to nurse's station, Toileting rounds, Update white board    Medication Interventions: Evaluate medications/consider consulting pharmacy, Patient to call before getting OOB, Teach patient to arise slowly    Elimination Interventions: Call light in reach, Elevated toilet seat, Patient to call for help with toileting needs, Toilet paper/wipes in reach, Toileting schedule/hourly rounds

## 2017-11-25 NOTE — PROGRESS NOTES
Bedside shift change report given to Gavi Prince RN (oncoming nurse) by Colletta Lipoma, RN (offgoing nurse). Report included the following information SBAR.

## 2017-11-25 NOTE — PROGRESS NOTES
11/24/17 1900   Vitals   Temp (!) 101.9 °F (38.8 °C)   Temp Source Oral   Pulse (Heart Rate) 85   Heart Rate Source Monitor   Resp Rate 24   O2 Sat (%) 95 %   Level of Consciousness Alert   /63   MAP (Calculated) 93   Cardiac Rhythm NSR   MEWS Score 4   Administered tylenol. Pt stable no distress noted. Will notify MD of elevated MEWS. Dr. Jose Miguel Fitzpatrick ordered to draw a cbc, cmp, and lactic acid and have pharmacy dose vanc per protocol and to continue to monitor patient.

## 2017-11-25 NOTE — INTERDISCIPLINARY ROUNDS
IDR/SLIDR Summary          Patient: Juanis Howard MRN: [id number]    Age: 80 y.o. YOB: 1930 Room/Bed: [facility]   Admit Diagnosis: CVA (cerebral vascular accident) [facility])  Principal Diagnosis: Hemispheric carotid artery syndrome   Goals: safety, d/c planning  Readmission: NO  Quality Measure: Not applicable  VTE Prophylaxis: Mechanical  Influenza Vaccine screening completed? YES  Pneumococcal Vaccine screening completed? NO  Mobility needs: Yes   Nutrition plan:No  Consults:P.T, O.T. and Speech    Financial concerns:No  Escalated to CM? NO  RRAT Score: 13   Interventions:H2H  Testing due for pt today?  YES  LOS: 2 days Expected length of stay 3 days  Discharge plan: janet Druze   PCP: Monet Regalado MD  Transportation needs: Yes    Days before discharge:two or more days before discharge   Discharge disposition: Nursing Home    Signed:     Camila Saba  11/25/2017  12:58 AM

## 2017-11-25 NOTE — PROGRESS NOTES
Pharmacist Note - Vancomycin Dosing    Consult provided for this 80 y.o. female for indication of UTI. Antibiotic regimen(s): vancomycin, levaquin, metronidazole    Recent Labs      17   1942  17   0305  17   1244   WBC  15.7*  18.0*  20.3*   CREA  0.89  0.83  0.90   BUN  8  11  13     Frequency of BMP: daily  Height: 165.1 cm  Weight: 85.6 kg  Est CrCl: 40-50 ml/min; Temp (24hrs), Av.6 °F (37.6 °C), Min:98.1 °F (36.7 °C), Max:101.9 °F (38.8 °C)    Cultures:: blood: in process      Goal trough = 10 - 15 mcg/mL    Therapy will be initiated with a loading dose of 1750 mg IV x 1 to be followed by a maintenance dose of 1250 mg IV every 24 hours. Pharmacy to follow patient daily and order levels / make dose adjustments as appropriate.

## 2017-11-25 NOTE — PROGRESS NOTES
Problem: Pressure Injury - Risk of  Goal: *Prevention of pressure ulcer  Outcome: Progressing Towards Goal   11/24/17 2000   Wound Prevention and Protection Methods   Orientation of Wound Prevention Posterior   Location of Wound Prevention Sacrum/Coccyx   Dressing Present  No   Read Only, Retired: Wound Treatment (non-mechanical)   Wound Offloading (Prevention Methods) Bed, pressure reduction mattress;Pillows;Repositioning

## 2017-11-25 NOTE — ROUTINE PROCESS
Bedside and Verbal shift change report given to Ayesha Hankins (oncoming nurse) by Maria L Whitman (offgoing nurse). Report included the following information SBAR, Kardex, Intake/Output, MAR, Recent Results, Med Rec Status and Cardiac Rhythm NSR.

## 2017-11-25 NOTE — PROGRESS NOTES
Hospitalist Progress Note  Elma Myles MD  Answering service: [phone] OR [phone] from in house phone      Date of Service:  2017  NAME:  Rich Ag  :  1930  MRN:  [id number]      Admission Summary:   79 yo woman with obesity, bipolar disorder, COPD, HLD, HTN, and reported left ovarian cyst was BIBEMS to the ED from [address] [place] [address] on 17 with sudden onset slurred speech and difficulty swallowing. She presented under Code Stroke. Facility reportedly had C diff outbreak, and patient had diarrhea that tested C diff negative. She was admitted to [facility] for dysarthria.      Interval history / Subjective:      No complaints today; Tmax 101.9F; d/w nurse    Assessment & Plan:     Sepsis likely due to UTI (POA)  - tachycardia, fever, leucocytosis, tachypnea on admission  - lactic acid WNL  - BCx  NGTD  - check UA/C&S  - CXR  clear  - already started on levofloxacin and Flagyl  for presumed infectious diarrhea (C diff negative at facility PTA)  - vancomycin started ; de-escalate as indicated    Dysarthria   - resolved PTA  - CT head wo contrast  No acute findings and no change since 2016.  - TTE  EF 60-65%, no RWMA  - b/l carotid duplex  unremarkable  - Neuro following  - MRI brain  no acute; chronic changes  - started ASA 81mg daily  - regular diet per SLP and no further SLP needs  - TSH, vitamin D, B12 WNL    Left ovarian cyst (POA) - patient/family aware, no further workup    Diarrhea  - none since presentation; reportedly negative C diff at [facility]  - empiric levofloxacin, Flagyl started ; de-escalate as indicated  - continue probiotic    COPD  - no evidence of chronic respiratory failure; patient is not on home O2 per son  - no acute exacerbation at this time  - son reports patient takes nebs q4h while awake but at this time nebs prn are sufficient    HTN - controlled at this time    Bipolar disorder - controlled on home meds    Rheumatoid arthritis - continue prednisone and methotrexate    DM2 - controlled, metformin on hold; FBS, SSI    Hypothyroidism - TSH low-normal; check T4, T3; continue levothyroxine    Obesity, Body mass index is 32.76 kg/(m^2). Code status: full  DVT prophylaxis: SCDs    Care Plan discussed with: Patient/Family and Nurse  Disposition: TBD. Came from [facility]. Likely dc in 1-2 days     Hospital Problems  Date Reviewed: 11/24/2017          Codes Class Noted POA    * (Principal)Hemispheric carotid artery syndrome ICD-10-CM: G45.1  ICD-9-CM: 435.8  11/23/2017 Yes            Review of Systems:   Limited ROS due to patient's mental status but as per subjective     Vital Signs:    Last 24hrs VS reviewed since prior progress note.  Most recent are:  Visit Vitals    BP (!) 140/33 (BP 1 Location: Left arm, BP Patient Position: At rest)    Pulse 80    Temp 98.4 °F (36.9 °C)    Resp 23    Ht 5' 5\" (1.651 m)    Wt 89.3 kg (196 lb 13.9 oz)    SpO2 95%    BMI 32.76 kg/m2       Intake/Output Summary (Last 24 hours) at 11/25/17 1830  Last data filed at 11/25/17 1232   Gross per 24 hour   Intake             4085 ml   Output             1500 ml   Net             2585 ml        Physical Examination:     Constitutional:  awake, no acute distress, obese   ENT:  oral mucosa moist, oropharynx benign  Neck supple, no masses   Resp:  CTA bilaterally anteriorly, no wheezing/rhonchi/rales   CV:  regular rhythm, normal rate, no m/r/g appreciated, no peripheral edema, +pulses    GI:  +BS, soft, non distended, non tender, obese     Musculoskeletal:  moves all extremities    Neurologic:  AOx2 to person and place (baseline), follows commands     Skin:  warm, dry  Eyes:  PERRL    Data Review:    Review and/or order of clinical lab test  Review and/or order of tests in the radiology section of CPT  Review and/or order of tests in the medicine section of CPT    Labs: Recent Labs      11/25/17 0328 11/24/17 1942   WBC  15.5*  15.7*   HGB  11.9  10.7*   HCT  37.4  33.9*   PLT  271  271     Recent Labs      11/25/17 0328 11/24/17 1942 11/24/17   0305   NA  140  138  140   K  3.8  3.9  3.9   CL  109*  106  106   CO2  24  27  24   BUN  6  8  11   CREA  0.87  0.89  0.83   GLU  137*  139*  120*   CA  9.2  8.6  8.9   MG  1.7   --    --    PHOS  1.7*   --    --      Recent Labs      11/24/17 1942 11/23/17   1244   SGOT  18  18   ALT  15  14   AP  91  88   TBILI  0.8  1.5*   TP  6.4  6.9   ALB  2.3*  2.7*   GLOB  4.1*  4.2*     Recent Labs      11/23/17   1234   INR  1.5*      No results for input(s): FE, TIBC, PSAT, FERR in the last 72 hours. No results found for: FOL, RBCF   No results for input(s): PH, PCO2, PO2 in the last 72 hours. No results for input(s): CPK, CKNDX, TROIQ in the last 72 hours.     No lab exists for component: CPKMB  No results found for: CHOL, CHOLX, CHLST, CHOLV, HDL, LDL, LDLC, DLDLP, TGLX, TRIGL, TRIGP, CHHD, CHHDX  Lab Results   Component Value Date/Time    Glucose (POC) 144 11/25/2017 04:47 PM    Glucose (POC) 143 11/25/2017 12:31 PM    Glucose (POC) 156 11/25/2017 07:27 AM    Glucose (POC) 116 11/24/2017 09:45 PM    Glucose (POC) 155 11/24/2017 04:16 PM     Lab Results   Component Value Date/Time    Color YELLOW/STRAW 11/24/2017 02:45 PM    Appearance CLEAR 11/24/2017 02:45 PM    Specific gravity 1.007 11/24/2017 02:45 PM    pH (UA) 7.5 11/24/2017 02:45 PM    Protein NEGATIVE  11/24/2017 02:45 PM    Glucose NEGATIVE  11/24/2017 02:45 PM    Ketone NEGATIVE  11/24/2017 02:45 PM    Bilirubin NEGATIVE  11/24/2017 02:45 PM    Urobilinogen 1.0 11/24/2017 02:45 PM    Nitrites NEGATIVE  11/24/2017 02:45 PM    Leukocyte Esterase TRACE 11/24/2017 02:45 PM    Epithelial cells FEW 11/24/2017 02:45 PM    Bacteria NEGATIVE  11/24/2017 02:45 PM    WBC 0-4 11/24/2017 02:45 PM    RBC 0-5 11/24/2017 02:45 PM         Medications Reviewed:     Current Facility-Administered Medications   Medication Dose Route Frequency    levoFLOXacin (LEVAQUIN) 750 mg in D5W IVPB  750 mg IntraVENous Q24H    Vancomycin pharmacy to dose   Other Rx Dosing/Monitoring    vancomycin (VANCOCIN) 1250 mg in  ml infusion  1,250 mg IntraVENous Q24H    aspirin chewable tablet 81 mg  81 mg Oral DAILY    polyethylene glycol (MIRALAX) packet 17 g  17 g Oral DAILY PRN    metroNIDAZOLE (FLAGYL) IVPB premix 500 mg  500 mg IntraVENous Q8H    acetaminophen (TYLENOL) tablet 650 mg  650 mg Oral Q6H PRN    albuterol-ipratropium (DUO-NEB) 2.5 MG-0.5 MG/3 ML  3 mL Nebulization Q6H PRN    atorvastatin (LIPITOR) tablet 20 mg  20 mg Oral QHS    guaiFENesin ER (MUCINEX) tablet 1,200 mg  1,200 mg Oral BID    lactobac ac& pc-s.therm-b.anim (COBY Q/RISAQUAD)  1 Cap Oral DAILY    levothyroxine (SYNTHROID) tablet 50 mcg  50 mcg Oral ACB    mirtazapine (REMERON) tablet 7.5 mg  7.5 mg Oral QHS    nystatin (MYCOSTATIN) 100,000 unit/gram powder   Topical BID    pantoprazole (PROTONIX) tablet 40 mg  40 mg Oral DAILY    propranolol (INDERAL) tablet 10 mg  10 mg Oral DAILY    dextrose 5 % - 0.45% NaCl infusion  100 mL/hr IntraVENous CONTINUOUS    insulin lispro (HUMALOG) injection   SubCUTAneous AC&HS    glucose chewable tablet 16 g  4 Tab Oral PRN    dextrose (D50W) injection syrg 12.5-25 g  12.5-25 g IntraVENous PRN    glucagon (GLUCAGEN) injection 1 mg  1 mg IntraMUSCular PRN     ______________________________________________________________________  EXPECTED LENGTH OF STAY: 2d 2h  ACTUAL LENGTH OF STAY:          2                 Isis Tellez MD

## 2017-11-26 LAB
BACTERIA SPEC CULT: NORMAL
BACTERIA SPEC CULT: NORMAL
GLUCOSE BLD STRIP.AUTO-MCNC: 137 MG/DL (ref 65–100)
GLUCOSE BLD STRIP.AUTO-MCNC: 146 MG/DL (ref 65–100)
GLUCOSE BLD STRIP.AUTO-MCNC: 166 MG/DL (ref 65–100)
GLUCOSE BLD STRIP.AUTO-MCNC: 234 MG/DL (ref 65–100)
SERVICE CMNT-IMP: ABNORMAL
SERVICE CMNT-IMP: NORMAL
T3 SERPL-MCNC: 80 NG/DL (ref 71–180)

## 2017-11-26 PROCEDURE — 74011000250 HC RX REV CODE- 250: Performed by: FAMILY MEDICINE

## 2017-11-26 PROCEDURE — 74011250636 HC RX REV CODE- 250/636: Performed by: INTERNAL MEDICINE

## 2017-11-26 PROCEDURE — 74011000258 HC RX REV CODE- 258: Performed by: FAMILY MEDICINE

## 2017-11-26 PROCEDURE — 74011250637 HC RX REV CODE- 250/637: Performed by: FAMILY MEDICINE

## 2017-11-26 PROCEDURE — 74011250637 HC RX REV CODE- 250/637: Performed by: INTERNAL MEDICINE

## 2017-11-26 PROCEDURE — 82962 GLUCOSE BLOOD TEST: CPT

## 2017-11-26 PROCEDURE — 65660000000 HC RM CCU STEPDOWN

## 2017-11-26 PROCEDURE — 74011250636 HC RX REV CODE- 250/636: Performed by: PSYCHIATRY & NEUROLOGY

## 2017-11-26 PROCEDURE — 74011636637 HC RX REV CODE- 636/637: Performed by: FAMILY MEDICINE

## 2017-11-26 RX ADMIN — LEVOFLOXACIN 750 MG: 5 INJECTION, SOLUTION INTRAVENOUS at 17:09

## 2017-11-26 RX ADMIN — METRONIDAZOLE 500 MG: 500 INJECTION, SOLUTION INTRAVENOUS at 00:47

## 2017-11-26 RX ADMIN — GUAIFENESIN 1200 MG: 600 TABLET, EXTENDED RELEASE ORAL at 17:08

## 2017-11-26 RX ADMIN — GUAIFENESIN 1200 MG: 600 TABLET, EXTENDED RELEASE ORAL at 08:21

## 2017-11-26 RX ADMIN — ASPIRIN 81 MG 81 MG: 81 TABLET ORAL at 08:22

## 2017-11-26 RX ADMIN — PANTOPRAZOLE SODIUM 40 MG: 40 TABLET, DELAYED RELEASE ORAL at 08:22

## 2017-11-26 RX ADMIN — INSULIN LISPRO 3 UNITS: 100 INJECTION, SOLUTION INTRAVENOUS; SUBCUTANEOUS at 12:15

## 2017-11-26 RX ADMIN — NYSTATIN: 100000 POWDER TOPICAL at 17:09

## 2017-11-26 RX ADMIN — PROPRANOLOL HYDROCHLORIDE 10 MG: 10 TABLET ORAL at 08:24

## 2017-11-26 RX ADMIN — NYSTATIN: 100000 POWDER TOPICAL at 08:31

## 2017-11-26 RX ADMIN — MIRTAZAPINE 7.5 MG: 15 TABLET, FILM COATED ORAL at 21:55

## 2017-11-26 RX ADMIN — INSULIN LISPRO 2 UNITS: 100 INJECTION, SOLUTION INTRAVENOUS; SUBCUTANEOUS at 17:08

## 2017-11-26 RX ADMIN — DEXTROSE MONOHYDRATE AND SODIUM CHLORIDE 100 ML/HR: 5; .45 INJECTION, SOLUTION INTRAVENOUS at 17:09

## 2017-11-26 RX ADMIN — Medication 1 CAPSULE: at 08:21

## 2017-11-26 RX ADMIN — LEVOTHYROXINE SODIUM 50 MCG: 50 TABLET ORAL at 06:50

## 2017-11-26 RX ADMIN — ATORVASTATIN CALCIUM 20 MG: 20 TABLET, FILM COATED ORAL at 21:56

## 2017-11-26 RX ADMIN — VANCOMYCIN HYDROCHLORIDE 1250 MG: 10 INJECTION, POWDER, LYOPHILIZED, FOR SOLUTION INTRAVENOUS at 22:01

## 2017-11-26 NOTE — PROGRESS NOTES
Bedside shift change report given to Ashley Serna RN (oncoming nurse) by Pierce Guzman RN (offgoing nurse). Report included the following information SBAR, Kardex, ED Summary, Procedure Summary, Intake/Output, MAR, Accordion, Recent Results and Cardiac Rhythm NSR.

## 2017-11-26 NOTE — PROGRESS NOTES
The nurse called for a PRN breathing treatment on the patient. After assessing the patient the patient had some inspiratory wheezing, and diminished in the lower lobes.  The patient stated \" im having trouble catching my breath\"

## 2017-11-26 NOTE — ROUTINE PROCESS
Bedside and Verbal shift change report given to Emilia Reyez (oncoming nurse) by Anjum Hernandez (offgoing nurse). Report included the following information SBAR, Kardex, Intake/Output, MAR, Accordion, Recent Results, Med Rec Status and Cardiac Rhythm NSR.

## 2017-11-26 NOTE — PROGRESS NOTES
Bedside shift change report given to Maryse Phelps RN (oncoming nurse) by Tadeo Wu RN (offgoing nurse). Report included the following information SBAR, Kardex, ED Summary, Procedure Summary, Intake/Output, MAR, Accordion, Recent Results and Cardiac Rhythm NSR.

## 2017-11-26 NOTE — PROGRESS NOTES
Problem: Pressure Injury - Risk of  Goal: *Prevention of pressure ulcer  Outcome: Progressing Towards Goal   11/25/17 2000   Wound Prevention and Protection Methods   Orientation of Wound Prevention Posterior   Location of Wound Prevention Sacrum/Coccyx   Dressing Present  No   Read Only, Retired: Wound Treatment (non-mechanical)   Wound Offloading (Prevention Methods) Bed, pressure reduction mattress;Pillows;Repositioning     Pt on turn team, repositioning with pillows q2 hours, external catheter used to reduce moisture from incontinence.

## 2017-11-26 NOTE — PROGRESS NOTES
Problem: Falls - Risk of  Goal: *Absence of Falls  Document Kristina Fall Risk and appropriate interventions in the flowsheet.    Outcome: Progressing Towards Goal  Fall Risk Interventions:  Mobility Interventions: Communicate number of staff needed for ambulation/transfer, Patient to call before getting OOB, PT Consult for mobility concerns, PT Consult for assist device competence    Mentation Interventions: Adequate sleep, hydration, pain control, Door open when patient unattended, Evaluate medications/consider consulting pharmacy, Familiar objects from home, Family/sitter at bedside, Increase mobility, More frequent rounding, Reorient patient, Room close to nurse's station, Toileting rounds, Update white board    Medication Interventions: Evaluate medications/consider consulting pharmacy, Teach patient to arise slowly    Elimination Interventions: Call light in reach, Elevated toilet seat, Patient to call for help with toileting needs, Toilet paper/wipes in reach, Toileting schedule/hourly rounds

## 2017-11-26 NOTE — INTERDISCIPLINARY ROUNDS
IDR/SLIDR Summary          Patient: Surjit Valladares MRN: [id number]    Age: 80 y.o. YOB: 1930 Room/Bed: [facility]   Admit Diagnosis: CVA (cerebral vascular accident) [facility])  Principal Diagnosis: Hemispheric carotid artery syndrome   Goals: safety, d/c planning  Readmission: NO  Quality Measure: Not applicable  VTE Prophylaxis: Mechanical  Influenza Vaccine screening completed? YES  Pneumococcal Vaccine screening completed? NO  Mobility needs: Yes   Nutrition plan:No  Consults:P.T, O.T. and Speech    Financial concerns:No  Escalated to CM? NO  RRAT Score: 13   Interventions:H2H  Testing due for pt today?  YES  LOS: 3 days Expected length of stay 3 days  Discharge plan: [address] [place] [address]   PCP: Newton Patel MD  Transportation needs: Yes    Days before discharge:two or more days before discharge   Discharge disposition: Nursing Home    Signed:     Peña Piedra  11/26/2017  1:27 AM

## 2017-11-26 NOTE — ROUTINE PROCESS
2 nurses attempted several sticks for morning labs, without success. MD Mcintosh notified, orders to see if dayshift hospitalist has a better solution than an arterial stick d/t pt's hypersensitive skin and low pain tolerance.

## 2017-11-26 NOTE — PROGRESS NOTES
Hospitalist Progress Note  Wiliam Olvera MD  Answering service: [phone] OR [phone] from in house phone      Date of Service:  2017  NAME:  Radames Rosen  :  1930  MRN:  [id number]      Admission Summary:   79 yo woman with obesity, bipolar disorder, COPD, HLD, HTN, and reported left ovarian cyst was BIBEMS to the ED from [facility] on 17 with sudden onset slurred speech and difficulty swallowing. She presented under Code Stroke. Facility reportedly had C diff outbreak, and patient had diarrhea that tested C diff negative. She was admitted to [facility] for dysarthria.      Interval history / Subjective:      Unable to get labs this morning; RT tried arterial stick unsuccessfully and patient crying out; stopped after 1st attempt    Assessment & Plan:     Sepsis likely due to UTI (POA)  - tachycardia, fever, leucocytosis, tachypnea on admission  - lactic acid WNL  - BCx  NGTD  - check UA/C&S  - CXR  clear  - already started on levofloxacin  for presumed infectious diarrhea (C diff negative at facility PTA)  - s/p Flagyl -  - vancomycin started ; de-escalate as indicated    Dysarthria   - resolved PTA  - CT head wo contrast  no acute findings and no change since 2016  - TTE  EF 60-65%, no RWMA  - b/l carotid duplex  unremarkable  - Neuro following  - MRI brain  no acute; chronic changes  - started ASA 81mg daily  - regular diet per SLP and no further SLP needs  - TSH, vitamin D, B12 WNL    Left ovarian cyst (POA) - patient/family aware, no further workup    Diarrhea  - none since presentation; reportedly negative C diff at [facility]  - empiric levofloxacin, Flagyl started ; de-escalate as indicated  - continue probiotic    COPD  - no evidence of chronic respiratory failure; patient is not on home O2 per son  - no acute exacerbation at this time  - son reports patient takes nebs q4h while awake but at this time nebs prn are sufficient    HTN - controlled at this time    Bipolar disorder - controlled on home meds    Rheumatoid arthritis - continue prednisone and methotrexate    DM2 - controlled, metformin on hold; FBS, SSI    Hypothyroidism - TFTs WNL; continue levothyroxine    Obesity, Body mass index is 32.98 kg/(m^2). Code status: full  DVT prophylaxis: SCDs    Care Plan discussed with: Patient/Family and Nurse. Spoke to son and DIL at bedside  Disposition: TBD. Came from [employer]. Likely dc tomorrow     Hospital Problems  Date Reviewed: 11/24/2017          Codes Class Noted POA    * (Principal)Hemispheric carotid artery syndrome ICD-10-CM: G45.1  ICD-9-CM: 435.8  11/23/2017 Yes            Review of Systems:   Limited ROS due to patient's mental status but as per subjective     Vital Signs:    Last 24hrs VS reviewed since prior progress note.  Most recent are:  Visit Vitals    /46 (BP 1 Location: Left arm, BP Patient Position: At rest)    Pulse 81    Temp 99.9 °F (37.7 °C)    Resp 21    Ht 5' 5\" (1.651 m)    Wt 89.9 kg (198 lb 3.1 oz)    SpO2 97%    BMI 32.98 kg/m2       Intake/Output Summary (Last 24 hours) at 11/26/17 1222  Last data filed at 11/26/17 0650   Gross per 24 hour   Intake             3100 ml   Output             1400 ml   Net             1700 ml      Physical Examination:     Constitutional:  awake, no acute distress, obese   ENT:  oral mucosa moist, oropharynx benign  Neck supple, no masses   Resp:  CTA bilaterally anteriorly, no wheezing/rhonchi/rales   CV:  regular rhythm, normal rate, no m/r/g appreciated, no peripheral edema, +pulses    GI:  +BS, soft, non distended, non tender, obese     Musculoskeletal:  moves all extremities    Neurologic:  AOx2 to person and place (baseline), follows commands     Skin:  warm, dry  Eyes:  PERRL    Data Review:    Review and/or order of clinical lab test  Review and/or order of tests in the radiology section of CPT  Review and/or order of tests in the medicine section of CPT    Labs:     Recent Labs      11/25/17 0328 11/24/17 1942   WBC  15.5*  15.7*   HGB  11.9  10.7*   HCT  37.4  33.9*   PLT  271  271     Recent Labs      11/25/17   0328  11/24/17 1942 11/24/17   0305   NA  140  138  140   K  3.8  3.9  3.9   CL  109*  106  106   CO2  24  27  24   BUN  6  8  11   CREA  0.87  0.89  0.83   GLU  137*  139*  120*   CA  9.2  8.6  8.9   MG  1.7   --    --    PHOS  1.7*   --    --      Recent Labs      11/24/17 1942 11/23/17   1244   SGOT  18  18   ALT  15  14   AP  91  88   TBILI  0.8  1.5*   TP  6.4  6.9   ALB  2.3*  2.7*   GLOB  4.1*  4.2*     Recent Labs      11/23/17   1234   INR  1.5*      No results for input(s): FE, TIBC, PSAT, FERR in the last 72 hours. No results found for: FOL, RBCF   No results for input(s): PH, PCO2, PO2 in the last 72 hours. No results for input(s): CPK, CKNDX, TROIQ in the last 72 hours.     No lab exists for component: CPKMB  No results found for: CHOL, CHOLX, CHLST, CHOLV, HDL, LDL, LDLC, DLDLP, TGLX, TRIGL, TRIGP, CHHD, CHHDX  Lab Results   Component Value Date/Time    Glucose (POC) 234 11/26/2017 12:01 PM    Glucose (POC) 137 11/26/2017 08:06 AM    Glucose (POC) 138 11/25/2017 09:39 PM    Glucose (POC) 144 11/25/2017 04:47 PM    Glucose (POC) 143 11/25/2017 12:31 PM     Lab Results   Component Value Date/Time    Color YELLOW/STRAW 11/24/2017 02:45 PM    Appearance CLEAR 11/24/2017 02:45 PM    Specific gravity 1.007 11/24/2017 02:45 PM    pH (UA) 7.5 11/24/2017 02:45 PM    Protein NEGATIVE  11/24/2017 02:45 PM    Glucose NEGATIVE  11/24/2017 02:45 PM    Ketone NEGATIVE  11/24/2017 02:45 PM    Bilirubin NEGATIVE  11/24/2017 02:45 PM    Urobilinogen 1.0 11/24/2017 02:45 PM    Nitrites NEGATIVE  11/24/2017 02:45 PM    Leukocyte Esterase TRACE 11/24/2017 02:45 PM    Epithelial cells FEW 11/24/2017 02:45 PM    Bacteria NEGATIVE  11/24/2017 02:45 PM    WBC 0-4 11/24/2017 02:45 PM    RBC 0-5 11/24/2017 02:45 PM         Medications Reviewed:     Current Facility-Administered Medications   Medication Dose Route Frequency    levoFLOXacin (LEVAQUIN) 750 mg in D5W IVPB  750 mg IntraVENous Q24H    Vancomycin pharmacy to dose   Other Rx Dosing/Monitoring    vancomycin (VANCOCIN) 1250 mg in  ml infusion  1,250 mg IntraVENous Q24H    aspirin chewable tablet 81 mg  81 mg Oral DAILY    polyethylene glycol (MIRALAX) packet 17 g  17 g Oral DAILY PRN    acetaminophen (TYLENOL) tablet 650 mg  650 mg Oral Q6H PRN    albuterol-ipratropium (DUO-NEB) 2.5 MG-0.5 MG/3 ML  3 mL Nebulization Q6H PRN    atorvastatin (LIPITOR) tablet 20 mg  20 mg Oral QHS    guaiFENesin ER (MUCINEX) tablet 1,200 mg  1,200 mg Oral BID    lactobac ac& pc-s.therm-b.anim (COBY Q/RISAQUAD)  1 Cap Oral DAILY    levothyroxine (SYNTHROID) tablet 50 mcg  50 mcg Oral ACB    mirtazapine (REMERON) tablet 7.5 mg  7.5 mg Oral QHS    nystatin (MYCOSTATIN) 100,000 unit/gram powder   Topical BID    pantoprazole (PROTONIX) tablet 40 mg  40 mg Oral DAILY    propranolol (INDERAL) tablet 10 mg  10 mg Oral DAILY    dextrose 5 % - 0.45% NaCl infusion  100 mL/hr IntraVENous CONTINUOUS    insulin lispro (HUMALOG) injection   SubCUTAneous AC&HS    glucose chewable tablet 16 g  4 Tab Oral PRN    dextrose (D50W) injection syrg 12.5-25 g  12.5-25 g IntraVENous PRN    glucagon (GLUCAGEN) injection 1 mg  1 mg IntraMUSCular PRN     ______________________________________________________________________  EXPECTED LENGTH OF STAY: 2d 2h  ACTUAL LENGTH OF STAY:          3                 Ny Thompson MD

## 2017-11-27 LAB
ANION GAP SERPL CALC-SCNC: 8 MMOL/L (ref 5–15)
BASOPHILS # BLD: 0 K/UL (ref 0–0.1)
BASOPHILS NFR BLD: 0 % (ref 0–1)
BUN SERPL-MCNC: 7 MG/DL (ref 6–20)
BUN/CREAT SERPL: 9 (ref 12–20)
CALCIUM SERPL-MCNC: 9.2 MG/DL (ref 8.5–10.1)
CHLORIDE SERPL-SCNC: 114 MMOL/L (ref 97–108)
CO2 SERPL-SCNC: 21 MMOL/L (ref 21–32)
CREAT SERPL-MCNC: 0.77 MG/DL (ref 0.55–1.02)
EOSINOPHIL # BLD: 0.2 K/UL (ref 0–0.4)
EOSINOPHIL NFR BLD: 1 % (ref 0–7)
ERYTHROCYTE [DISTWIDTH] IN BLOOD BY AUTOMATED COUNT: 13.8 % (ref 11.5–14.5)
GLUCOSE BLD STRIP.AUTO-MCNC: 107 MG/DL (ref 65–100)
GLUCOSE BLD STRIP.AUTO-MCNC: 107 MG/DL (ref 65–100)
GLUCOSE BLD STRIP.AUTO-MCNC: 157 MG/DL (ref 65–100)
GLUCOSE BLD STRIP.AUTO-MCNC: 159 MG/DL (ref 65–100)
GLUCOSE SERPL-MCNC: 145 MG/DL (ref 65–100)
HCT VFR BLD AUTO: 34.5 % (ref 35–47)
HGB BLD-MCNC: 10.8 G/DL (ref 11.5–16)
LYMPHOCYTES # BLD: 3.8 K/UL (ref 0.8–3.5)
LYMPHOCYTES NFR BLD: 28 % (ref 12–49)
MAGNESIUM SERPL-MCNC: 1.7 MG/DL (ref 1.6–2.4)
MCH RBC QN AUTO: 28.8 PG (ref 26–34)
MCHC RBC AUTO-ENTMCNC: 31.3 G/DL (ref 30–36.5)
MCV RBC AUTO: 92 FL (ref 80–99)
MONOCYTES # BLD: 1.3 K/UL (ref 0–1)
MONOCYTES NFR BLD: 10 % (ref 5–13)
NEUTS SEG # BLD: 8.2 K/UL (ref 1.8–8)
NEUTS SEG NFR BLD: 61 % (ref 32–75)
PLATELET # BLD AUTO: 288 K/UL (ref 150–400)
POTASSIUM SERPL-SCNC: 3.9 MMOL/L (ref 3.5–5.1)
RBC # BLD AUTO: 3.75 M/UL (ref 3.8–5.2)
SERVICE CMNT-IMP: ABNORMAL
SODIUM SERPL-SCNC: 143 MMOL/L (ref 136–145)
WBC # BLD AUTO: 13.6 K/UL (ref 3.6–11)

## 2017-11-27 PROCEDURE — 74011250636 HC RX REV CODE- 250/636: Performed by: PSYCHIATRY & NEUROLOGY

## 2017-11-27 PROCEDURE — 85025 COMPLETE CBC W/AUTO DIFF WBC: CPT | Performed by: INTERNAL MEDICINE

## 2017-11-27 PROCEDURE — 74011250637 HC RX REV CODE- 250/637: Performed by: INTERNAL MEDICINE

## 2017-11-27 PROCEDURE — 74011636637 HC RX REV CODE- 636/637: Performed by: FAMILY MEDICINE

## 2017-11-27 PROCEDURE — 83735 ASSAY OF MAGNESIUM: CPT | Performed by: INTERNAL MEDICINE

## 2017-11-27 PROCEDURE — 65660000000 HC RM CCU STEPDOWN

## 2017-11-27 PROCEDURE — 74011250637 HC RX REV CODE- 250/637: Performed by: FAMILY MEDICINE

## 2017-11-27 PROCEDURE — 36415 COLL VENOUS BLD VENIPUNCTURE: CPT | Performed by: INTERNAL MEDICINE

## 2017-11-27 PROCEDURE — 82962 GLUCOSE BLOOD TEST: CPT

## 2017-11-27 PROCEDURE — 80048 BASIC METABOLIC PNL TOTAL CA: CPT | Performed by: INTERNAL MEDICINE

## 2017-11-27 RX ADMIN — MIRTAZAPINE 7.5 MG: 15 TABLET, FILM COATED ORAL at 21:11

## 2017-11-27 RX ADMIN — GUAIFENESIN 1200 MG: 600 TABLET, EXTENDED RELEASE ORAL at 09:11

## 2017-11-27 RX ADMIN — GUAIFENESIN 1200 MG: 600 TABLET, EXTENDED RELEASE ORAL at 18:00

## 2017-11-27 RX ADMIN — CASTOR OIL AND BALSAM, PERU: 788; 87 OINTMENT TOPICAL at 18:00

## 2017-11-27 RX ADMIN — ATORVASTATIN CALCIUM 20 MG: 20 TABLET, FILM COATED ORAL at 21:11

## 2017-11-27 RX ADMIN — LEVOTHYROXINE SODIUM 50 MCG: 50 TABLET ORAL at 06:53

## 2017-11-27 RX ADMIN — ASPIRIN 81 MG 81 MG: 81 TABLET ORAL at 09:11

## 2017-11-27 RX ADMIN — INSULIN LISPRO 2 UNITS: 100 INJECTION, SOLUTION INTRAVENOUS; SUBCUTANEOUS at 09:11

## 2017-11-27 RX ADMIN — NYSTATIN: 100000 POWDER TOPICAL at 18:00

## 2017-11-27 RX ADMIN — PROPRANOLOL HYDROCHLORIDE 10 MG: 10 TABLET ORAL at 09:11

## 2017-11-27 RX ADMIN — INSULIN LISPRO 2 UNITS: 100 INJECTION, SOLUTION INTRAVENOUS; SUBCUTANEOUS at 12:47

## 2017-11-27 RX ADMIN — PANTOPRAZOLE SODIUM 40 MG: 40 TABLET, DELAYED RELEASE ORAL at 09:11

## 2017-11-27 RX ADMIN — Medication 1 CAPSULE: at 09:11

## 2017-11-27 RX ADMIN — LEVOFLOXACIN 750 MG: 5 INJECTION, SOLUTION INTRAVENOUS at 17:04

## 2017-11-27 RX ADMIN — NYSTATIN: 100000 POWDER TOPICAL at 09:00

## 2017-11-27 NOTE — WOUND CARE
WOCN Note:     New consult placed by RN for skin assessment. Chart reviewed  Admit dx:  Hemispheric carotid artery syndrome  PMH:  bipolar disorder on lithium,  hematuria, arthritis, COPD, hypertension, cataract surgery    Assessment:   Patient is Alert, communicative, and requires assist of 2 with repositioning. On turn team.  Bed: sport  Patient wearing briefs for incontinence and purewick. Patient reports no pain. Bilateral buttocks and sacral skin intact and without erythema. Candidiasis under bilateral breasts. Currently being treated with Nystatin. Right heel, non blanching pink erythema  2 x 2x 0 cm     Patient repositioned on right side with pillow between the knees. Heels offloaded on pillow and prevalon boots     Recommendations:    1. Venelex to heels twice daily  2. Prevalon boots bilaterally  3. Continue turn team  4. Continue Sport bed    Skin Care & Pressure Prevention:  Minimize layers of linen/pads under patient to optimize support surface. Turn/reposition approximately every 2 hours and offload heels. Manage incontinence / promote continence; Aloe Vesta to sacrum and buttocks. Specialty bed: Sport. Use only flat sheet and one incontinence pad.     Discussed above plan with patient and Mary Smyth RN    Transition of Care: Plan to follow weekly and as needed while admitted to hospital.    Merton Ormond, BSN RN  Office [phone]  Pager [phone]

## 2017-11-27 NOTE — PROGRESS NOTES
Completed IDR with patient and daughter at the bedside. Awaiting a decision from [facility] for home oxygen. Patient has [employer] PPO policy and plans to return back to work as a .     Fax Attached order and Respiratory Step Test to [employer] referral.    Oneil Mann RN

## 2017-11-27 NOTE — PROGRESS NOTES
Problem: Falls - Risk of  Goal: *Absence of Falls  Document Kristina Fall Risk and appropriate interventions in the flowsheet.    Outcome: Progressing Towards Goal  Fall Risk Interventions:  Mobility Interventions: Assess mobility with egress test, Communicate number of staff needed for ambulation/transfer, OT consult for ADLs, Mechanical lift, Patient to call before getting OOB, PT Consult for mobility concerns, PT Consult for assist device competence, Strengthening exercises (ROM-active/passive)    Mentation Interventions: Adequate sleep, hydration, pain control, Bed/chair exit alarm, Door open when patient unattended, Evaluate medications/consider consulting pharmacy, Family/sitter at bedside, Increase mobility, More frequent rounding, Reorient patient, Room close to nurse's station, Toileting rounds, Update white board    Medication Interventions: Bed/chair exit alarm, Evaluate medications/consider consulting pharmacy, Patient to call before getting OOB, Teach patient to arise slowly    Elimination Interventions: Bed/chair exit alarm, Call light in reach, Patient to call for help with toileting needs, Toileting schedule/hourly rounds

## 2017-11-27 NOTE — PROGRESS NOTES
Bedside shift change report given to Arnaud Galvan RN (oncoming nurse) by Joselin Hwang RN (offgoing nurse). Report included the following information SBAR, Kardex, ED Summary, Procedure Summary, Intake/Output, MAR, Accordion, Recent Results and Cardiac Rhythm NSR.

## 2017-11-27 NOTE — PROGRESS NOTES
Assumed care of pt this am. Pt is alert and oriented with no known needs at this time. Pt right heel has a stage 1 pressure ulcer present, bilateral boots placed on pt and wound care consult in system. Pt also SOB this morning, 2 liters NC placed on pt.

## 2017-11-27 NOTE — PROGRESS NOTES
PT Note:    Chart reviewed and cleared by nursing. Spoke with patient regarding her PLOF, she stated \"They pick me up and put me in my wheelchair where I live. I don't help much. \"  Encouraged patient to attempt EOB mobility or repositioning for skin integrity. Patient adamantly refusing stating \"Everyone's picking on me poking me, I'm tired and need to sleep. \"  Patient reiterated that she is max/total A at baseline. Acute skilled PT not indicated. Will complete order. [facility]

## 2017-11-27 NOTE — INTERDISCIPLINARY ROUNDS
IDR/SLIDR Summary          Patient: Ryan Grossman MRN: [id number]    Age: 80 y.o. YOB: 1930 Room/Bed: [facility]   Admit Diagnosis: CVA (cerebral vascular accident) [facility])  Principal Diagnosis: Hemispheric carotid artery syndrome   Goals: Safety, d/c planning  Readmission: NO  Quality Measure: Not applicable  VTE Prophylaxis: Mechanical  Influenza Vaccine screening completed? YES  Pneumococcal Vaccine screening completed? YES  Mobility needs: Yes   Nutrition plan:No  Consults:P.T, O.T., Speech and Case Management    Financial concerns:No  Escalated to CM? YES  RRAT Score: 13   Interventions:Home Health  Testing due for pt today?  YES  LOS: 4 days Expected length of stay 3-4 days  Discharge plan: [address] [place] [address]   PCP: Veronique Stovall MD  Transportation needs: Yes    Days before discharge:one day until discharge   Discharge disposition: SNF    Signed:     Eldon Bosworth, RN  11/27/2017  12:37 AM

## 2017-11-27 NOTE — PROGRESS NOTES
Hospitalist Progress Note  Marie Madrid MD  Answering service: [phone] OR [phone] from in house phone      Date of Service:  2017  NAME:  Tj Manrique  :  1930  MRN:  [id number]      Admission Summary:   79 yo woman with obesity, bipolar disorder, COPD, HLD, HTN, and reported left ovarian cyst was BIBEMS to the ED from 7 [facility] on 17 with sudden onset slurred speech and difficulty swallowing. She presented under Code Stroke. Facility reportedly had C diff outbreak, and patient had diarrhea that tested C diff negative. She was admitted to [facility] for dysarthria.      Interval history / Subjective:      No complaints; no overnight events; seen with nurse and CM    Assessment & Plan:     Sepsis likely due to UTI (POA)  - tachycardia, fever, leucocytosis, tachypnea on admission  - lactic acid WNL  - BCx  NGTD  - UCx  Enterococcus, C albicans  - CXR  clear  - s/p Flagyl -, vancomycin -  - already started on levofloxacin  for presumed infectious diarrhea (C diff negative at facility PTA); continue for now    Dysarthria   - resolved PTA  - CT head wo contrast  no acute findings and no change since 2016  - TTE  EF 60-65%, no RWMA  - b/l carotid duplex  unremarkable  - Neuro following  - MRI brain  no acute; chronic changes  - started ASA 81mg daily  - regular diet per SLP and no further SLP needs  - TSH, vitamin D, B12 WNL    Left ovarian cyst (POA) - patient/family aware, no further workup    Diarrhea  - none since presentation; reportedly negative C diff at long term  - empiric levofloxacin, Flagyl started ; de-escalate as indicated  - continue probiotic    COPD  - no evidence of chronic respiratory failure; patient is not on home O2 per son  - no acute exacerbation at this time  - son reports patient takes nebs q4h while awake but at this time nebs prn are sufficient    HTN - controlled at this time    Bipolar disorder - controlled on home meds    Rheumatoid arthritis - continue prednisone and methotrexate    DM2 - controlled, metformin on hold; FBS, SSI    Hypothyroidism - TFTs WNL; continue levothyroxine    Obesity, Body mass index is 33.27 kg/(m^2). Code status: full  DVT prophylaxis: SCDs    Care Plan discussed with: Patient/Family and Nurse. Spoke to son and DIL at bedside  Disposition: TBD. Came from [employer]. Likely dc back to [facility] when medically stable     Hospital Problems  Date Reviewed: 11/24/2017          Codes Class Noted POA    * (Principal)Hemispheric carotid artery syndrome ICD-10-CM: G45.1  ICD-9-CM: 435.8  11/23/2017 Yes            Review of Systems:   Limited ROS due to patient's mental status but as per subjective     Vital Signs:    Last 24hrs VS reviewed since prior progress note.  Most recent are:  Visit Vitals    /68 (BP 1 Location: Right arm, BP Patient Position: At rest)    Pulse 84    Temp 99.1 °F (37.3 °C)    Resp 21    Ht 5' 5\" (1.651 m)    Wt 90.7 kg (199 lb 15.3 oz)    SpO2 100%    BMI 33.27 kg/m2       Intake/Output Summary (Last 24 hours) at 11/27/17 1605  Last data filed at 11/26/17 1900   Gross per 24 hour   Intake                0 ml   Output              550 ml   Net             -550 ml      Physical Examination:     Constitutional:  somnolent but arousable, no acute distress, obese   ENT:  oral mucosa moist, oropharynx benign  Neck supple, no masses   Resp:  CTA bilaterally anteriorly, no wheezing/rhonchi/rales   CV:  regular rhythm, normal rate, no m/r/g appreciated, no peripheral edema, +pulses    GI:  +BS, soft, non distended, non tender, obese     Musculoskeletal:  moves all extremities    Neurologic:  somnolent but arousable, Ox2 to person and place (baseline), follows commands     Skin:  warm, dry  Eyes:  PERRL    Data Review:    Review and/or order of clinical lab test  Review and/or order of tests in the radiology section of CPT  Review and/or order of tests in the medicine section of CPT    Labs:     Recent Labs      11/27/17 0407 11/25/17 0328   WBC  13.6*  15.5*   HGB  10.8*  11.9   HCT  34.5*  37.4   PLT  288  271     Recent Labs      11/27/17 0407 11/25/17 0328 11/24/17 1942   NA  143  140  138   K  3.9  3.8  3.9   CL  114*  109*  106   CO2  21 24 27   BUN  7  6  8   CREA  0.77  0.87  0.89   GLU  145*  137*  139*   CA  9.2  9.2  8.6   MG  1.7  1.7   --    PHOS   --   1.7*   --      Recent Labs      11/24/17 1942   SGOT  18   ALT  15   AP  91   TBILI  0.8   TP  6.4   ALB  2.3*   GLOB  4.1*     No results for input(s): INR, PTP, APTT in the last 72 hours. No lab exists for component: INREXT, INREXT   No results for input(s): FE, TIBC, PSAT, FERR in the last 72 hours. No results found for: FOL, RBCF   No results for input(s): PH, PCO2, PO2 in the last 72 hours. No results for input(s): CPK, CKNDX, TROIQ in the last 72 hours.     No lab exists for component: CPKMB  No results found for: CHOL, CHOLX, CHLST, CHOLV, HDL, LDL, LDLC, DLDLP, TGLX, TRIGL, TRIGP, CHHD, CHHDX  Lab Results   Component Value Date/Time    Glucose (POC) 159 11/27/2017 11:54 AM    Glucose (POC) 157 11/27/2017 07:37 AM    Glucose (POC) 146 11/26/2017 09:48 PM    Glucose (POC) 166 11/26/2017 04:50 PM    Glucose (POC) 234 11/26/2017 12:01 PM     Lab Results   Component Value Date/Time    Color YELLOW/STRAW 11/24/2017 02:45 PM    Appearance CLEAR 11/24/2017 02:45 PM    Specific gravity 1.007 11/24/2017 02:45 PM    pH (UA) 7.5 11/24/2017 02:45 PM    Protein NEGATIVE  11/24/2017 02:45 PM    Glucose NEGATIVE  11/24/2017 02:45 PM    Ketone NEGATIVE  11/24/2017 02:45 PM    Bilirubin NEGATIVE  11/24/2017 02:45 PM    Urobilinogen 1.0 11/24/2017 02:45 PM    Nitrites NEGATIVE  11/24/2017 02:45 PM    Leukocyte Esterase TRACE 11/24/2017 02:45 PM    Epithelial cells FEW 11/24/2017 02:45 PM    Bacteria NEGATIVE 11/24/2017 02:45 PM    WBC 0-4 11/24/2017 02:45 PM    RBC 0-5 11/24/2017 02:45 PM     Medications Reviewed:     Current Facility-Administered Medications   Medication Dose Route Frequency    balsam peru-castor oil (VENELEX)  mg/gram ointment   Topical BID    levoFLOXacin (LEVAQUIN) 750 mg in D5W IVPB  750 mg IntraVENous Q24H    aspirin chewable tablet 81 mg  81 mg Oral DAILY    polyethylene glycol (MIRALAX) packet 17 g  17 g Oral DAILY PRN    acetaminophen (TYLENOL) tablet 650 mg  650 mg Oral Q6H PRN    albuterol-ipratropium (DUO-NEB) 2.5 MG-0.5 MG/3 ML  3 mL Nebulization Q6H PRN    atorvastatin (LIPITOR) tablet 20 mg  20 mg Oral QHS    guaiFENesin ER (MUCINEX) tablet 1,200 mg  1,200 mg Oral BID    lactobac ac& pc-s.therm-b.anim (COBY Q/RISAQUAD)  1 Cap Oral DAILY    levothyroxine (SYNTHROID) tablet 50 mcg  50 mcg Oral ACB    mirtazapine (REMERON) tablet 7.5 mg  7.5 mg Oral QHS    nystatin (MYCOSTATIN) 100,000 unit/gram powder   Topical BID    pantoprazole (PROTONIX) tablet 40 mg  40 mg Oral DAILY    propranolol (INDERAL) tablet 10 mg  10 mg Oral DAILY    dextrose 5 % - 0.45% NaCl infusion  100 mL/hr IntraVENous CONTINUOUS    insulin lispro (HUMALOG) injection   SubCUTAneous AC&HS    glucose chewable tablet 16 g  4 Tab Oral PRN    dextrose (D50W) injection syrg 12.5-25 g  12.5-25 g IntraVENous PRN    glucagon (GLUCAGEN) injection 1 mg  1 mg IntraMUSCular PRN     ______________________________________________________________________  EXPECTED LENGTH OF STAY: 3d 16h  ACTUAL LENGTH OF STAY:          4                 Nancy Lui MD

## 2017-11-27 NOTE — PROGRESS NOTES
Spoke with Florencia Jackson at the [facility] at [phone] who confirms that patient is a resident at the healthcare unit. Updates faxed to:  [phone] with confirmation received. When inquiring about facility assisted transportation, Florencia Jackson reports they are down a  and most likely would not be able to assist with picking patient up at discharge. Florencia Jackson requests to be notified of discharge date once established and may be reached at the [phone] number.     Shannan Arambula RN

## 2017-11-28 ENCOUNTER — APPOINTMENT (OUTPATIENT)
Dept: GENERAL RADIOLOGY | Age: 82
DRG: 871 | End: 2017-11-28
Attending: FAMILY MEDICINE
Payer: MEDICARE

## 2017-11-28 LAB
ANION GAP SERPL CALC-SCNC: 7 MMOL/L (ref 5–15)
ARTERIAL PATENCY WRIST A: YES
ARTERIAL PATENCY WRIST A: YES
BACTERIA SPEC CULT: ABNORMAL
BACTERIA SPEC CULT: ABNORMAL
BASE DEFICIT BLD-SCNC: 3 MMOL/L
BASE DEFICIT BLD-SCNC: 6 MMOL/L
BASE DEFICIT BLD-SCNC: 7 MMOL/L
BASOPHILS # BLD: 0 K/UL (ref 0–0.1)
BASOPHILS NFR BLD: 0 % (ref 0–1)
BDY SITE: ABNORMAL
BDY SITE: ABNORMAL
BUN SERPL-MCNC: 8 MG/DL (ref 6–20)
BUN/CREAT SERPL: 10 (ref 12–20)
CALCIUM SERPL-MCNC: 9.3 MG/DL (ref 8.5–10.1)
CC UR VC: ABNORMAL
CHLORIDE SERPL-SCNC: 114 MMOL/L (ref 97–108)
CO2 SERPL-SCNC: 23 MMOL/L (ref 21–32)
CREAT SERPL-MCNC: 0.83 MG/DL (ref 0.55–1.02)
EOSINOPHIL # BLD: 0.1 K/UL (ref 0–0.4)
EOSINOPHIL NFR BLD: 1 % (ref 0–7)
ERYTHROCYTE [DISTWIDTH] IN BLOOD BY AUTOMATED COUNT: 14.1 % (ref 11.5–14.5)
GAS FLOW.O2 O2 DELIVERY SYS: ABNORMAL L/MIN
GAS FLOW.O2 O2 DELIVERY SYS: ABNORMAL L/MIN
GAS FLOW.O2 SETTING OXYMISER: 2 L/M
GAS FLOW.O2 SETTING OXYMISER: 2 L/M
GLUCOSE BLD STRIP.AUTO-MCNC: 120 MG/DL (ref 65–100)
GLUCOSE BLD STRIP.AUTO-MCNC: 124 MG/DL (ref 65–100)
GLUCOSE BLD STRIP.AUTO-MCNC: 126 MG/DL (ref 65–100)
GLUCOSE BLD STRIP.AUTO-MCNC: 128 MG/DL (ref 65–100)
GLUCOSE SERPL-MCNC: 108 MG/DL (ref 65–100)
HCO3 BLD-SCNC: 20 MMOL/L (ref 22–26)
HCO3 BLD-SCNC: 21.6 MMOL/L (ref 22–26)
HCO3 BLD-SCNC: 23.8 MMOL/L (ref 22–26)
HCT VFR BLD AUTO: 36.6 % (ref 35–47)
HGB BLD-MCNC: 11.4 G/DL (ref 11.5–16)
LYMPHOCYTES # BLD: 3.8 K/UL (ref 0.8–3.5)
LYMPHOCYTES NFR BLD: 26 % (ref 12–49)
MAGNESIUM SERPL-MCNC: 1.8 MG/DL (ref 1.6–2.4)
MCH RBC QN AUTO: 29.1 PG (ref 26–34)
MCHC RBC AUTO-ENTMCNC: 31.1 G/DL (ref 30–36.5)
MCV RBC AUTO: 93.4 FL (ref 80–99)
MONOCYTES # BLD: 1.5 K/UL (ref 0–1)
MONOCYTES NFR BLD: 10 % (ref 5–13)
NEUTS SEG # BLD: 9.3 K/UL (ref 1.8–8)
NEUTS SEG NFR BLD: 63 % (ref 32–75)
O2/TOTAL GAS SETTING VFR VENT: 0.4 %
PCO2 BLD: 45.5 MMHG (ref 35–45)
PCO2 BLD: 48.4 MMHG (ref 35–45)
PCO2 BLD: 50.6 MMHG (ref 35–45)
PH BLD: 7.24 [PH] (ref 7.35–7.45)
PH BLD: 7.25 [PH] (ref 7.35–7.45)
PH BLD: 7.3 [PH] (ref 7.35–7.45)
PHOSPHATE SERPL-MCNC: 2.6 MG/DL (ref 2.6–4.7)
PLATELET # BLD AUTO: 309 K/UL (ref 150–400)
PO2 BLD: 105 MMHG (ref 80–100)
PO2 BLD: 137 MMHG (ref 80–100)
PO2 BLD: 91 MMHG (ref 80–100)
POTASSIUM SERPL-SCNC: 4.3 MMOL/L (ref 3.5–5.1)
RBC # BLD AUTO: 3.92 M/UL (ref 3.8–5.2)
SAO2 % BLD: 96 % (ref 92–97)
SAO2 % BLD: 97 % (ref 92–97)
SAO2 % BLD: 99 % (ref 92–97)
SERVICE CMNT-IMP: ABNORMAL
SODIUM SERPL-SCNC: 144 MMOL/L (ref 136–145)
SPECIMEN TYPE: ABNORMAL
WBC # BLD AUTO: 14.7 K/UL (ref 3.6–11)

## 2017-11-28 PROCEDURE — 82803 BLOOD GASES ANY COMBINATION: CPT

## 2017-11-28 PROCEDURE — 80048 BASIC METABOLIC PNL TOTAL CA: CPT | Performed by: INTERNAL MEDICINE

## 2017-11-28 PROCEDURE — 83735 ASSAY OF MAGNESIUM: CPT | Performed by: INTERNAL MEDICINE

## 2017-11-28 PROCEDURE — 92610 EVALUATE SWALLOWING FUNCTION: CPT | Performed by: SPEECH-LANGUAGE PATHOLOGIST

## 2017-11-28 PROCEDURE — 82962 GLUCOSE BLOOD TEST: CPT

## 2017-11-28 PROCEDURE — 77030013140 HC MSK NEB VYRM -A

## 2017-11-28 PROCEDURE — 71010 XR CHEST PORT: CPT

## 2017-11-28 PROCEDURE — 36600 WITHDRAWAL OF ARTERIAL BLOOD: CPT

## 2017-11-28 PROCEDURE — 74011250636 HC RX REV CODE- 250/636: Performed by: HOSPITALIST

## 2017-11-28 PROCEDURE — 85025 COMPLETE CBC W/AUTO DIFF WBC: CPT | Performed by: INTERNAL MEDICINE

## 2017-11-28 PROCEDURE — 84100 ASSAY OF PHOSPHORUS: CPT | Performed by: INTERNAL MEDICINE

## 2017-11-28 PROCEDURE — 74011250637 HC RX REV CODE- 250/637: Performed by: FAMILY MEDICINE

## 2017-11-28 PROCEDURE — 65660000000 HC RM CCU STEPDOWN

## 2017-11-28 PROCEDURE — 77030012879 HC MSK CPAP FLL FAC PHIL -B

## 2017-11-28 PROCEDURE — 36415 COLL VENOUS BLD VENIPUNCTURE: CPT | Performed by: INTERNAL MEDICINE

## 2017-11-28 RX ORDER — AMOXICILLIN AND CLAVULANATE POTASSIUM 875; 125 MG/1; MG/1
1 TABLET, FILM COATED ORAL EVERY 12 HOURS
Status: DISCONTINUED | OUTPATIENT
Start: 2017-11-28 | End: 2017-11-29

## 2017-11-28 RX ORDER — ENOXAPARIN SODIUM 100 MG/ML
40 INJECTION SUBCUTANEOUS EVERY 24 HOURS
Status: DISCONTINUED | OUTPATIENT
Start: 2017-11-28 | End: 2017-12-05 | Stop reason: HOSPADM

## 2017-11-28 RX ORDER — FUROSEMIDE 10 MG/ML
40 INJECTION INTRAMUSCULAR; INTRAVENOUS ONCE
Status: COMPLETED | OUTPATIENT
Start: 2017-11-28 | End: 2017-11-28

## 2017-11-28 RX ORDER — FUROSEMIDE 10 MG/ML
20 INJECTION INTRAMUSCULAR; INTRAVENOUS DAILY
Status: COMPLETED | OUTPATIENT
Start: 2017-11-28 | End: 2017-11-30

## 2017-11-28 RX ADMIN — NYSTATIN: 100000 POWDER TOPICAL at 09:00

## 2017-11-28 RX ADMIN — FUROSEMIDE 20 MG: 10 INJECTION, SOLUTION INTRAMUSCULAR; INTRAVENOUS at 13:17

## 2017-11-28 RX ADMIN — NYSTATIN: 100000 POWDER TOPICAL at 17:26

## 2017-11-28 RX ADMIN — CASTOR OIL AND BALSAM, PERU: 788; 87 OINTMENT TOPICAL at 17:27

## 2017-11-28 RX ADMIN — ENOXAPARIN SODIUM 40 MG: 40 INJECTION SUBCUTANEOUS at 18:46

## 2017-11-28 RX ADMIN — FUROSEMIDE 40 MG: 10 INJECTION, SOLUTION INTRAMUSCULAR; INTRAVENOUS at 18:46

## 2017-11-28 RX ADMIN — CASTOR OIL AND BALSAM, PERU: 788; 87 OINTMENT TOPICAL at 10:14

## 2017-11-28 RX ADMIN — LEVOTHYROXINE SODIUM 50 MCG: 50 TABLET ORAL at 06:30

## 2017-11-28 NOTE — PROGRESS NOTES
NUTRITION COMPLETE ASSESSMENT    RECOMMENDATIONS:   1. Assist with feeding at all meals   - use supplements to give medications (Boost Pudding/Magic Cup)  2. Weekly weights  3. Daily MVI if not accepting of supplements     Interventions/Plan:   Food/Nutrient Delivery:    Commercial supplement (Boost Pudding BID, Magic Cup, Ensure Clear) Feeding assistance at meals        Assessment:   Reason for Assessment: [x] Provider Consult    Diet: Puree  Supplements: none  Nutritionally Significant Medications: [x] Reviewed & Includes: SSI, libra-Q, levaquin, synthroid, remeron, protonix, vanco; PRN: miralax  Meal Intake: No data found. Subjective:  Asleep at time of visit, confused today. Objective:  Pt admitted for hemispheric carotid artery syndrome from the [facility]. PMHx: CAD, HTN, COPD, bipolar d/o, hypercholesterolemia. Sepsis 2/2 UTI noted with abx rx. Left ovarian cyst noted but with no plans for workup. Trouble with pills last night with wet cough noted. Seen by speech with puree diet recommended. SLP noting difficulty swalllowing and poor intake likely d/t mental status. Of note: pt was self-feeding last week and now needs total assistance. Please assist with all meals and use Boost Pudding to give meds (instead of applesauce). Boost Pudding provides 480kcal, 12g protein. Per consult note \"Pt not eating well and has not according to one brother for sometime. Pt mostly eating soups. \" Will also trial Magic Cup (290kcal, 9g protein) and Ensure Clear (240kcal, 8g protein). Wt gain noted with edema present. Weight from earlier this year likely not accurate since was \"patient stated. \"  Wt Readings:   11/28/17 90.6 kg (199 lb 11.8 oz)   04/10/16 87.8 kg (193 lb 9 oz)   02/21/16 127 kg (280 lb) - \"patient stated weight)     Will continue to follow for mental status, PO intake, supplement acceptance and wt trends. Consider adding daily MVI if not accepting of supplements.   Estimated Nutrition Needs: Kcals/day: 1565 Kcals/day (1565-1696kcal)  Protein: 96 g (1.1g/kg)  Fluid: 1565 ml (1ml/kcal)  Based On: Shyla [address] (x 1.2-1.3)  Weight Used: UBW (87.7kg)    Pt expected to meet estimated nutrient needs:  []   Yes     []  No [x] Unable to predict at this time  Nutrition Diagnosis:   1. Inadequate oral intake related to AMS, poor appetite, swallowing difficulty as evidenced by confusion; puree diet per SLP    Goals:     Consumption of at least 1/3 of meals and 2-3 supplements/day in 3-4 days     Monitoring & Evaluation:    - Liquid meal replacement, Total energy intake, Protein intake   - Weight/weight change (swallow fx)     Previous Nutrition Goals Met:   N/A  Previous Recommendations:    N/A    Education & Discharge Needs:   [x] None Identified   [] Identified and addressed    [] Participated in care plan, discharge planning, and/or interdisciplinary rounds        Cultural, Religion and ethnic food preferences identified: None    Skin Integrity: [x]Intact  []Other  Edema: []None [x]Other: 1+ generalized, 2+ LUE, 1+ BLLE  Last BM: 11/23  Food Allergies: [x]None []Other  Diet Restrictions: Cultural/Orthodoxy Preference(s): None     Anthropometrics:    Weight Loss Metrics 11/28/2017 4/10/2016 4/7/2016 4/7/2016 4/7/2016 2/21/2016   Today's Wt 199 lb 11.8 oz 193 lb 9 oz - 280 lb - 280 lb   BMI 33.24 kg/m2 - 30.31 kg/m2 - 46.59 kg/m2 46.59 kg/m2   Some encounter information is confidential and restricted. Go to Review Flowsheets activity to see all data. Weight Source: Bed  Height: 5' 5\" (165.1 cm),    Body mass index is 33.24 kg/(m^2).   IBW : 56.7 kg (125 lb), % IBW (Calculated): 159.79 %  Usual Body Weight: 87.5 kg (193 lb),      Labs:    Lab Results   Component Value Date/Time    Sodium 144 11/28/2017 02:51 AM    Potassium 4.3 11/28/2017 02:51 AM    Chloride 114 11/28/2017 02:51 AM    CO2 23 11/28/2017 02:51 AM    Glucose 108 11/28/2017 02:51 AM    BUN 8 11/28/2017 02:51 AM    Creatinine 0.83 11/28/2017 02:51 AM    Calcium 9.3 11/28/2017 02:51 AM    Magnesium 1.8 11/28/2017 02:51 AM    Phosphorus 2.6 11/28/2017 02:51 AM    Albumin 2.3 11/24/2017 07:42 PM     Lab Results   Component Value Date/Time    Hemoglobin A1c 6.2 02/22/2016 03:00 AM     Fuad Melgoza RD Pager #[phone] ex [phone]

## 2017-11-28 NOTE — PROGRESS NOTES
Hospitalist Progress Note  Damián Suh MD  Answering service: [phone] OR [phone] from in house phone      Date of Service:  2017  NAME:  Forrest Snyder  :  1930  MRN:  [id number]      Admission Summary:   81 yo woman with obesity, bipolar disorder, COPD, HLD, HTN, and reported left ovarian cyst was BIBEMS to the ED from [facility] on 17 with sudden onset slurred speech and difficulty swallowing. She presented under Code Stroke. Facility reportedly had C diff outbreak, and patient had diarrhea that tested C diff negative. She was admitted to [facility] for dysarthria.      Interval history / Subjective:      No complaints; no overnight events; seen with nurse and CM  Pt having some breathing issue ABG reviewed pt placed on BIPAP  Also given 290 mg of lasix IV     Assessment & Plan:     Sepsis likely due to UTI (POA)  - tachycardia, fever, leucocytosis, tachypnea on admission  - lactic acid WNL  - BCx  NGTD  - UCx  Enterococcus F , C albicans  - CXR  clear  - ABX CHANGED TO AUGMENTIN BID    Dysarthria   - resolved PTA  - CT head wo contrast  no acute findings and no change since 2016  - TTE  EF 60-65%, no RWMA  - b/l carotid duplex  unremarkable  - Neuro following  - MRI brain  no acute; chronic changes  - started ASA 81mg daily  - regular diet per SLP and no further SLP needs  - TSH, vitamin D, B12 WNL    Left ovarian cyst (POA) - patient/family aware, no further workup    Diarrhea  - none since presentation; reportedly negative C diff at [facility]  - empiric levofloxacin, Flagyl started ; de-escalate as indicated  - continue probiotic    COPD  - no evidence of chronic respiratory failure; patient is not on home O2 per son  - no acute exacerbation at this time  - son reports patient takes nebs q4h while awake but at this time nebs prn are sufficient  - PRN BIPAP    HTN - controlled at this time    Bipolar disorder - controlled on home meds    Rheumatoid arthritis - continue prednisone and methotrexate    DM2 - controlled, metformin on hold; FBS, SSI    Hypothyroidism - TFTs WNL; continue levothyroxine    Obesity, Body mass index is 33.24 kg/(m^2). Code status: full  DVT prophylaxis: SCDs    Care Plan discussed with: Patient/Family and Nurse. Spoke to son and DIL at bedside  Disposition: TBD. Came from [employer]. Lik[facility] when medically stable     Hospital Problems  Date Reviewed: 11/24/2017          Codes Class Noted POA    * (Principal)Hemispheric carotid artery syndrome ICD-10-CM: G45.1  ICD-9-CM: 435.8  11/23/2017 Yes            Review of Systems:   Limited ROS due to patient's mental status but as per subjective     Vital Signs:    Last 24hrs VS reviewed since prior progress note.  Most recent are:  Visit Vitals    /50    Pulse 97    Temp 99 °F (37.2 °C)    Resp 21    Ht 5' 5\" (1.651 m)    Wt 90.6 kg (199 lb 11.8 oz)    SpO2 99%    BMI 33.24 kg/m2       Intake/Output Summary (Last 24 hours) at 11/28/17 1239  Last data filed at 11/28/17 0400   Gross per 24 hour   Intake                0 ml   Output             2000 ml   Net            -2000 ml      Physical Examination:     Constitutional:  somnolent but arousable, no acute distress, obese   ENT:  oral mucosa moist, oropharynx benign  Neck supple, no masses   Resp:  CTA bilaterally anteriorly, no wheezing/rhonchi/rales   CV:  regular rhythm, normal rate, no m/r/g appreciated, no peripheral edema, +pulses    GI:  +BS, soft, non distended, non tender, obese     Musculoskeletal:  moves all extremities    Neurologic:  somnolent but arousable, Ox2 to person and place (baseline), follows commands     Skin:  warm, dry  Eyes:  PERRL    Data Review:    Review and/or order of clinical lab test  Review and/or order of tests in the radiology section of CPT  Review and/or order of tests in the medicine section of CPT    Labs:     Recent Labs      11/28/17 0251 11/27/17 0407   WBC  14.7*  13.6*   HGB  11.4*  10.8*   HCT  36.6  34.5*   PLT  309  288     Recent Labs      11/28/17 0251 11/27/17 0407   NA  144  143   K  4.3  3.9   CL  114*  114*   CO2  23  21   BUN  8  7   CREA  0.83  0.77   GLU  108*  145*   CA  9.3  9.2   MG  1.8  1.7   PHOS  2.6   --      No results for input(s): SGOT, GPT, ALT, AP, TBIL, TBILI, TP, ALB, GLOB, GGT, AML, LPSE in the last 72 hours. No lab exists for component: AMYP, HLPSE  No results for input(s): INR, PTP, APTT in the last 72 hours. No lab exists for component: INREXT, INREXT   No results for input(s): FE, TIBC, PSAT, FERR in the last 72 hours. No results found for: FOL, RBCF   No results for input(s): PH, PCO2, PO2 in the last 72 hours. No results for input(s): CPK, CKNDX, TROIQ in the last 72 hours.     No lab exists for component: CPKMB  No results found for: CHOL, CHOLX, CHLST, CHOLV, HDL, LDL, LDLC, DLDLP, TGLX, TRIGL, TRIGP, CHHD, CHHDX  Lab Results   Component Value Date/Time    Glucose (POC) 124 11/28/2017 12:04 PM    Glucose (POC) 128 11/28/2017 07:17 AM    Glucose (POC) 107 11/27/2017 09:26 PM    Glucose (POC) 107 11/27/2017 04:39 PM    Glucose (POC) 159 11/27/2017 11:54 AM     Lab Results   Component Value Date/Time    Color YELLOW/STRAW 11/24/2017 02:45 PM    Appearance CLEAR 11/24/2017 02:45 PM    Specific gravity 1.007 11/24/2017 02:45 PM    pH (UA) 7.5 11/24/2017 02:45 PM    Protein NEGATIVE  11/24/2017 02:45 PM    Glucose NEGATIVE  11/24/2017 02:45 PM    Ketone NEGATIVE  11/24/2017 02:45 PM    Bilirubin NEGATIVE  11/24/2017 02:45 PM    Urobilinogen 1.0 11/24/2017 02:45 PM    Nitrites NEGATIVE  11/24/2017 02:45 PM    Leukocyte Esterase TRACE 11/24/2017 02:45 PM    Epithelial cells FEW 11/24/2017 02:45 PM    Bacteria NEGATIVE  11/24/2017 02:45 PM    WBC 0-4 11/24/2017 02:45 PM    RBC 0-5 11/24/2017 02:45 PM     Medications Reviewed:     Current Facility-Administered Medications   Medication Dose Route Frequency    furosemide (LASIX) injection 20 mg  20 mg IntraVENous DAILY    balsam peru-castor oil (VENELEX)  mg/gram ointment   Topical BID    levoFLOXacin (LEVAQUIN) 750 mg in D5W IVPB  750 mg IntraVENous Q24H    aspirin chewable tablet 81 mg  81 mg Oral DAILY    polyethylene glycol (MIRALAX) packet 17 g  17 g Oral DAILY PRN    acetaminophen (TYLENOL) tablet 650 mg  650 mg Oral Q6H PRN    albuterol-ipratropium (DUO-NEB) 2.5 MG-0.5 MG/3 ML  3 mL Nebulization Q6H PRN    atorvastatin (LIPITOR) tablet 20 mg  20 mg Oral QHS    guaiFENesin ER (MUCINEX) tablet 1,200 mg  1,200 mg Oral BID    lactobac ac& pc-s.therm-b.anim (COBY Q/RISAQUAD)  1 Cap Oral DAILY    levothyroxine (SYNTHROID) tablet 50 mcg  50 mcg Oral ACB    mirtazapine (REMERON) tablet 7.5 mg  7.5 mg Oral QHS    nystatin (MYCOSTATIN) 100,000 unit/gram powder   Topical BID    pantoprazole (PROTONIX) tablet 40 mg  40 mg Oral DAILY    propranolol (INDERAL) tablet 10 mg  10 mg Oral DAILY    insulin lispro (HUMALOG) injection   SubCUTAneous AC&HS    glucose chewable tablet 16 g  4 Tab Oral PRN    dextrose (D50W) injection syrg 12.5-25 g  12.5-25 g IntraVENous PRN    glucagon (GLUCAGEN) injection 1 mg  1 mg IntraMUSCular PRN     ______________________________________________________________________  EXPECTED LENGTH OF STAY: 3d 16h  ACTUAL LENGTH OF STAY:          5                 Torres Ellis MD

## 2017-11-28 NOTE — PROGRESS NOTES
8291: MD paged regarding patients MEWS score of 3; Respiratory and HR slowing creeping up throughout the night; O2 sats remaining between 92-95% with NC 2L. Pt appearing to have some increased difficulty breathing and has had a wet non-productive cough throughout the night. Upon admission pt had complaints of dysphagia, SLP has seen and signed off on pt. While attempting to give patient her AM medications, she began coughing repeatedly with sips of water. 11/28/17 0640   Vitals   Temp 98 °F (36.7 °C)   Temp Source Oral   Pulse (Heart Rate) (!) 102   Heart Rate Source Monitor   Resp Rate 26   O2 Sat (%) 94 %   Level of Consciousness Alert   /48   MAP (Calculated) 70   Cardiac Rhythm NSR   MEWS Score 3     0671: Dr. Juan Walker returned page; orders placed to keep pt NPO until SLP can re-evaluate; STAT chest xray and ABGs ordered. 0710: Respiratory at bedside; ABGs obtained      Bedside and Verbal shift change report given to Garo Hogan (oncoming nurse) by Ynes Roach (offgoing nurse). Report included the following information SBAR, Kardex, Intake/Output, MAR, Recent Results and Cardiac Rhythm Sinus tachycardia.

## 2017-11-28 NOTE — PROGRESS NOTES
Problem: Dysphagia (Adult)  Goal: *Acute Goals and Plan of Care (Insert Text)  Speech Goals   Initiated 11/28/2017  1. Patient will tolerate puree/thin liquid diet with no s/s of aspiration within 7 days   2. Patient will participate in solid trials with timely and complete mastication and full oral clearance within 7 days  Speech LAnguage Pathology bedside swallow evaluation  Patient: Feli Gottlieb (89 y.o. female)  Date: 11/28/2017  Primary Diagnosis: CVA (cerebral vascular accident) [facility])        Precautions:        ASSESSMENT :  Note, patient evaluated earlier in admission and found to be with no oral or pharyngeal dysphagia. SLP approached by nsg who reports coughing with water and meds. Based on the objective data described below, the patient presents with decline in function that appears to be related to decreased level of alertness and decline in mental status compared to initial evaluation. Of note patient was feeding herself on initial evaluation and now making no attempt to hold any PO and food falls immediately from fingers when placed in them. Patient with moderate oral dysphagia secondary to decreased level of alertness and mental status. Patient with anterior spillage, prolonged mastication, and absent posterior propulsion with solids. Patient chewed cracker for >60 seconds and could not clear with multiple liquid washes or verbal cues with the cracker eventually falling out of her mouth. Patient with no difficulty with purees or water. Pharyngeal phase remains intact with timely swallow initiation and functional hyolaryngeal excursion. No s/s of aspiration with any consistency, but risks remain if patient is fed during periods of decreased level of alertness or mental status or in a reclined position (patient requesting to drink lying down). Patient will benefit from skilled intervention to address the above impairments.   Patients rehabilitation potential is considered to be Fair  Factors which may influence rehabilitation potential include:   []            None noted  [x]            Mental ability/status  []            Medical condition  []            Home/family situation and support systems  []            Safety awareness  []            Pain tolerance/management  []            Other:      PLAN :  Recommendations and Planned Interventions:  -- Recommend puree/thin liquid diet, straws ok, meds crushed in applesauce, feed only when fully alert and at 90 degrees  -- SLP to follow for diet tolerance and solid trials as mental status and level of alertness allows  Frequency/Duration: Patient will be followed by speech-language pathology 3 times a week to address goals. Discharge Recommendations: To Be Determined     SUBJECTIVE:   Patient stated Water is all I want when asked if she would participate in evaluation. Patient was drowsy, confused, and irritable but cooperated with evaluation. OBJECTIVE:     Past Medical History:   Diagnosis Date    CAD (coronary artery disease)     COPD (chronic obstructive pulmonary disease) ([facility] [address]     Hypercholesteremia     Hypertension     Ill-defined condition     mass on ovaries    Psychiatric disorder     bipolar   No past surgical history on file.   Prior Level of Function/Home Situation:   Home Situation  Home Environment: Skilled nursing facility  One/Two Story Residence: Two story  Living Alone: No  Support Systems: Child(desi)  Patient Expects to be Discharged to[de-identified] Assisted living  Current DME Used/Available at Home: Wheelchair  Diet prior to admission: Regular  Current Diet:  NPO   Cognitive and Communication Status:  Neurologic State: Lethargic, Confused  Orientation Level: Oriented to person, Oriented to place  Cognition: Follows commands, Decreased attention/concentration  Perception: Appears intact  Perseveration: No perseveration noted  Safety/Judgement: Not assessed  Oral Assessment:  Oral Assessment  Labial: No impairment  Oral Hygiene: moist mucosa  Lingual: No impairment  Mandible: No impairment  P.O. Trials:  Patient Position: upright in bed  Vocal quality prior to P.O.: No impairment  Consistency Presented: Ice chips; Thin liquid;Puree; Solid  How Presented: SLP-fed/presented;Spoon;Straw;Successive swallows     Bolus Acceptance: No impairment  Bolus Formation/Control: Impaired  Type of Impairment: Anterior;Spillage; Incomplete;Mastication (with solids)  Propulsion: Other (comment) (absent with solids, cracker fell from mouth)  Oral Residue: None  Initiation of Swallow: No impairment  Laryngeal Elevation: Functional  Aspiration Signs/Symptoms: None  Pharyngeal Phase Characteristics: No impairment, issues, or problems              Oral Phase Severity: Moderate  Pharyngeal Phase Severity : No impairment    NOMS:   The NOMS functional outcome measure was used to quantify this patient's level of swallowing impairment. Based on the NOMS, the patient was determined to be at level 4 for swallow function     G Codes: In compliance with CMSs Claims Based Outcome Reporting, the following G-code set was chosen for this patient based the use of the NOMS functional outcome to quantify this patient's level of swallowing impairment. Using the NOMS, the patient was determined to be at level 4 for swallow function which correlates with the CK= 40-59% level of severity. Based on the objective assessment provided within this note, the current, goal, and discharge g-codes are as follows:    Swallow  Swallowing:   Swallow Current Status CK= 40-59%   Swallow Goal Status CJ= 20-39%      NOMS Swallowing Levels:  Level 1 (CN): NPO  Level 2 (CM): NPO but takes consistency in therapy  Level 3 (CL): Takes less than 50% of nutrition p.o. and continues with nonoral feedings; and/or safe with mod cues; and/or max diet restriction  Level 4 (CK):  Safe swallow but needs mod cues; and/or mod diet restriction; and/or still requires some nonoral feeding/supplements  Level 5 (CJ): Safe swallow with min diet restriction; and/or needs min cues  Level 6 (CI): Independent with p.o.; rare cues; usually self cues; may need to avoid some foods or needs extra time  Level 7 ([address] [place] [address]): Independent for all p.o.  KOREY. (2003). National Outcomes Measurement System (NOMS): Adult Speech-Language Pathology User's Guide. Pain:  Pain Scale 1: Numeric (0 - 10)  Pain Intensity 1: 0     After treatment:   []            Patient left in no apparent distress sitting up in chair  [x]            Patient left in no apparent distress in bed  [x]            Call bell left within reach  [x]            Nursing notified  []            Caregiver present  []            Bed alarm activated    COMMUNICATION/EDUCATION:   The patients plan of care including recommendations, planned interventions, and recommended diet changes were discussed with: Registered Nurse. Patient was educated regarding Her deficit(s) of dysphagia as this relates to Her diagnosis of CVA. She demonstrated Fair understanding as evidenced by verbalized understanding. [x]            Posted safety precautions in patient's room. [x]            Patient/family have participated as able in goal setting and plan of care. [x]            Patient/family agree to work toward stated goals and plan of care. []            Patient understands intent and goals of therapy, but is neutral about his/her participation. []            Patient is unable to participate in goal setting and plan of care. Thank you for this referral.  Wade Holt Student SLP   Time Calculation: 20 mins     Regarding student involvement in patient care:  A student participated in this treatment session. Per CMS Medicare statements and APTA guidelines I certify that the following was true:  1. I was present and directly observed the entire session. 2. I made all skilled judgments and clinical decisions regarding care.   3. I am the practitioner responsible for assessment, treatment, and documentation.

## 2017-11-28 NOTE — PROGRESS NOTES
Assumed care of pt this am. Pt made NPO last night for inability to swallow pills, wet sounding according to the nurse. ABG and xray done with results pending at this time. Speech will evaluate today until then oral medications to be held. MD ( team 9) notified of pt condition/results/MEWS, no new orders at this time, nurse will continue to monitor. Son has been updated about NPO and pt status. Nurse spoke with speech about reevaluation. ABG redrawn worsening results, pt to be placed on BIPAP. BIPAP placed on pt 1205. Orders entered to transfer to [facility]. Son has been updated about BIPAP, and transfer orders. Report called to [facility], nurse feels pt may be too lethargic to safely manage BIPAP and may need to be in ICU [phone]). MD has been paged. Pt to be transferred to ICU and ABG to be drawn for further management. Respiratory called about new ABG order. Bed board called to inform of room change. Pt now waiting on room. MD Leodan Liu at bedside and verbal orders taken. MEWS score elevated at 3, MD is aware. Report called to ICU at 2015, transported up by charge nurse on BIPAP.

## 2017-11-28 NOTE — PROGRESS NOTES
Rounded on [facility] patients and provided Anointing of the Sick at request of family.       Berkley Byrd

## 2017-11-28 NOTE — INTERDISCIPLINARY ROUNDS
IDR/SLIDR Summary          Patient: Sandra Colón MRN: [id number]    Age: 80 y.o. YOB: 1930 Room/Bed: [facility]   Admit Diagnosis: CVA (cerebral vascular accident) [facility])  Principal Diagnosis: Hemispheric carotid artery syndrome   Goals: Safety; d/c planning  Readmission: NO  Quality Measure: Not applicable  VTE Prophylaxis: Mechanical  Influenza Vaccine screening completed? YES  Pneumococcal Vaccine screening completed? NO  Mobility needs: Yes   Nutrition plan:No  Consults:P.T, O.T., Speech and Case Management    Financial concerns:No  Escalated to CM? YES  RRAT Score: 13     Testing due for pt today?  YES  LOS: 5 days Expected length of stay 3-4 days  Discharge plan: [address] [place] [address]   PCP: Giles Vaca MD  Transportation needs: Yes    Days before discharge:one day until discharge   Discharge disposition: SNF    Signed:     Janelle Nageotte, RN  11/28/2017  4:58 AM

## 2017-11-29 LAB
BACTERIA SPEC CULT: NORMAL
DATE LAST DOSE: ABNORMAL
GLUCOSE BLD STRIP.AUTO-MCNC: 121 MG/DL (ref 65–100)
GLUCOSE BLD STRIP.AUTO-MCNC: 122 MG/DL (ref 65–100)
GLUCOSE BLD STRIP.AUTO-MCNC: 137 MG/DL (ref 65–100)
GLUCOSE BLD STRIP.AUTO-MCNC: 148 MG/DL (ref 65–100)
LITHIUM SERPL-SCNC: <0.2 MMOL/L (ref 0.6–1.2)
REPORTED DOSE,DOSE: ABNORMAL UNITS
REPORTED DOSE/TIME,TMG: ABNORMAL
SERVICE CMNT-IMP: ABNORMAL
SERVICE CMNT-IMP: NORMAL

## 2017-11-29 PROCEDURE — 74011250636 HC RX REV CODE- 250/636: Performed by: HOSPITALIST

## 2017-11-29 PROCEDURE — 74011250637 HC RX REV CODE- 250/637: Performed by: FAMILY MEDICINE

## 2017-11-29 PROCEDURE — 77030018836 HC SOL IRR NACL ICUM -A

## 2017-11-29 PROCEDURE — 65660000000 HC RM CCU STEPDOWN

## 2017-11-29 PROCEDURE — 74011250637 HC RX REV CODE- 250/637: Performed by: INTERNAL MEDICINE

## 2017-11-29 PROCEDURE — 94640 AIRWAY INHALATION TREATMENT: CPT

## 2017-11-29 PROCEDURE — 80178 ASSAY OF LITHIUM: CPT | Performed by: INTERNAL MEDICINE

## 2017-11-29 PROCEDURE — 36415 COLL VENOUS BLD VENIPUNCTURE: CPT | Performed by: INTERNAL MEDICINE

## 2017-11-29 PROCEDURE — 92526 ORAL FUNCTION THERAPY: CPT

## 2017-11-29 PROCEDURE — 94660 CPAP INITIATION&MGMT: CPT

## 2017-11-29 PROCEDURE — 74011000250 HC RX REV CODE- 250: Performed by: INTERNAL MEDICINE

## 2017-11-29 PROCEDURE — 74011636637 HC RX REV CODE- 636/637: Performed by: FAMILY MEDICINE

## 2017-11-29 PROCEDURE — 77030032490 HC SLV COMPR SCD KNE COVD -B

## 2017-11-29 PROCEDURE — 82962 GLUCOSE BLOOD TEST: CPT

## 2017-11-29 PROCEDURE — 74011000250 HC RX REV CODE- 250: Performed by: FAMILY MEDICINE

## 2017-11-29 PROCEDURE — 74011250636 HC RX REV CODE- 250/636: Performed by: INTERNAL MEDICINE

## 2017-11-29 RX ORDER — IPRATROPIUM BROMIDE AND ALBUTEROL SULFATE 2.5; .5 MG/3ML; MG/3ML
3 SOLUTION RESPIRATORY (INHALATION)
Status: DISCONTINUED | OUTPATIENT
Start: 2017-11-29 | End: 2017-12-01

## 2017-11-29 RX ADMIN — NYSTATIN: 100000 POWDER TOPICAL at 08:55

## 2017-11-29 RX ADMIN — ENOXAPARIN SODIUM 40 MG: 40 INJECTION SUBCUTANEOUS at 18:03

## 2017-11-29 RX ADMIN — PIPERACILLIN AND TAZOBACTAM 10.12 G: 3; .375 INJECTION, POWDER, FOR SOLUTION INTRAVENOUS at 11:20

## 2017-11-29 RX ADMIN — NYSTATIN: 100000 POWDER TOPICAL at 18:03

## 2017-11-29 RX ADMIN — IPRATROPIUM BROMIDE AND ALBUTEROL SULFATE 3 ML: .5; 3 SOLUTION RESPIRATORY (INHALATION) at 22:29

## 2017-11-29 RX ADMIN — IPRATROPIUM BROMIDE AND ALBUTEROL SULFATE 3 ML: .5; 3 SOLUTION RESPIRATORY (INHALATION) at 14:31

## 2017-11-29 RX ADMIN — INSULIN LISPRO 2 UNITS: 100 INJECTION, SOLUTION INTRAVENOUS; SUBCUTANEOUS at 12:26

## 2017-11-29 RX ADMIN — CASTOR OIL AND BALSAM, PERU: 788; 87 OINTMENT TOPICAL at 08:43

## 2017-11-29 RX ADMIN — FUROSEMIDE 20 MG: 10 INJECTION, SOLUTION INTRAMUSCULAR; INTRAVENOUS at 08:56

## 2017-11-29 RX ADMIN — PIPERACILLIN SODIUM AND TAZOBACTAM SODIUM 3.38 G: .375; 3 INJECTION, POWDER, LYOPHILIZED, FOR SOLUTION INTRAVENOUS at 10:45

## 2017-11-29 NOTE — CONSULTS
[facility] [place]  Pulmonary, Critical Care, and Sleep Medicine    Initial Patient Consult    Name: Juanis Howard MRN: [id number]   : 1930 [facility]   Date: 2017        IMPRESSION:   · AMS/slurred speech- no evidence acute ischemic stroke on imaging. Suspect metabolic encephalopathy in setting os possible LLL PNA. I can't r/o Lithium toxicity as pt on this at home. T waves flattened on anterior precordial leads suggesting Li effect. TSH nml  · Acute resp failure- more of a met acidosis with inadequate resp compensation but no WOB. More depressed CNS drive- ? Lieffect vs septic enceph  · COPD - does not appear bronchospastic  · Probable LLL CAP- leukocytosis present  · Bipolar  · CAD- ECHo LVEF nml RV nml no PHTN       RECOMMENDATIONS:   · Check Li level  · Hold further doses for now  · Trial off bipap. · ABG as needed if desats  · Sched bipap at night with back up rate- possible central hypoventilation ( CSA /GLORIA)  · Change augmentin to zosyn for PNA  · pulm toilet  · Nebs  · Can go to IMCU     Subjective: This patient has been seen and evaluated at the request of Dr. Mariel Raymond for ICU mgmt. Patient is a 80 y.o. female from SNF with bipolar on Lithium and h/o COPD and CAD. Pt BIBEMS  with slurred speech and gen weakness. Code stroke called and CT head negative as was MRI brain for ischemic insults, bleeds or mass lesions. Pt very somnolent last night and ABG with 7.30/48/137/23. Moved to ICU and placed on bipap. Pt presently alert, protecting airway but does not follow commands. She is confused. Past Medical History:   Diagnosis Date    CAD (coronary artery disease)     COPD (chronic obstructive pulmonary disease) ([facility] [address]     Hypercholesteremia     Hypertension     Ill-defined condition     mass on ovaries    Psychiatric disorder     bipolar      No past surgical history on file.    Prior to Admission medications    Medication Sig Start Date End Date Taking? Authorizing Provider   cephALEXin (KEFLEX) 500 mg capsule Take 500 mg by mouth two (2) times a day. UTI, due to end 11/23/17   Yes Historical Provider   fluticasone-vilanterol (BREO ELLIPTA) 100-25 mcg/dose inhaler Take 1 Puff by inhalation daily. Yes Historical Provider   polyethylene glycol (MIRALAX) 17 gram packet Take 17 g by mouth daily. Yes Historical Provider   tiotropium (SPIRIVA WITH HANDIHALER) 18 mcg inhalation capsule Take 1 Cap by inhalation daily. Yes Historical Provider   sorbitol 70 % solution Take 30 mL by mouth every eight (8) hours as needed for Other (constipation). Yes Historical Provider   lidocaine (ASPERCREME, LIDOCAINE,) 4 % topical cream Apply  to affected area two (2) times daily as needed for Pain. Yes Historical Provider   hydrocortisone (PROCTOSOL HC) 2.5 % rectal cream Insert  into rectum as needed for Hemorrhoids. PRN after bowel movements (hemorrhoids)   Yes Historical Provider   metFORMIN (GLUCOPHAGE) 1,000 mg tablet Take 1,000 mg by mouth two (2) times daily (with meals). Yes Historical Provider   clotrimazole-betamethasone (LOTRISONE) topical cream APPLY TOPICALLY TWICE DAILY 10/23/17  Yes Parvin Macedo NP   furosemide (LASIX) 20 mg tablet Take 40 mg by mouth daily. Yes Historical Provider   acetaminophen (TYLENOL) 325 mg tablet Take 650 mg by mouth every six (6) hours as needed for Pain. Yes Historical Provider   NOMI acidoph & paracasei- S therm- Bifido (COBY-Q) 8 billion cell cap cap Take 1 Cap by mouth daily. 3/2/16  Yes Aggie Jacobsen MD   mirtazapine (REMERON) 7.5 mg tablet Take 1 Tab by mouth nightly. 3/2/16  Yes Aggie Jacobsen MD   lithium carbonate 300 mg tablet Take 1 Tab by mouth daily. In the morning 3/2/16  Yes Aggie Jacobsen MD   levothyroxine (SYNTHROID) 50 mcg tablet Take 50 mcg by mouth Daily (before breakfast). Yes Ethan Quinonez MD   omeprazole (PRILOSEC) 20 mg capsule Take 20 mg by mouth daily.    Yes Phys MD Cristiano   cholecalciferol (VITAMIN D3) 1,000 unit cap Take 1,000 Units by mouth daily. Yes Ethan Quinonez MD   albuterol-ipratropium (DUO-NEB) 2.5 mg-0.5 mg/3 ml nebulizer solution 3 mL by Nebulization route three (3) times daily. Yes Ethan Quinonez MD   propranolol (INDERAL) 10 mg tablet Take 10 mg by mouth daily. Yes Historical Provider   atorvastatin (LIPITOR) 40 mg tablet Take 20 mg by mouth nightly. Yes Historical Provider   nystatin (MYCOSTATIN) powder Apply  to affected area two (2) times a day. Historical Provider   guaiFENesin (MUCINEX) 1,200 mg Ta12 ER tablet Take 1,200 mg by mouth two (2) times a day. Ethan Quinonez MD     Allergies   Allergen Reactions    Enablex [Darifenacin] Rash      Social History   Substance Use Topics    Smoking status: Former Smoker    Smokeless tobacco: Not on file    Alcohol use No      No family history on file. Current Facility-Administered Medications   Medication Dose Route Frequency    furosemide (LASIX) injection 20 mg  20 mg IntraVENous DAILY    amoxicillin-clavulanate (AUGMENTIN) 875-125 mg per tablet 1 Tab  1 Tab Oral Q12H    enoxaparin (LOVENOX) injection 40 mg  40 mg SubCUTAneous Q24H    balsam peru-castor oil (VENELEX)  mg/gram ointment   Topical BID    aspirin chewable tablet 81 mg  81 mg Oral DAILY    atorvastatin (LIPITOR) tablet 20 mg  20 mg Oral QHS    guaiFENesin ER (MUCINEX) tablet 1,200 mg  1,200 mg Oral BID    lactobac ac& pc-s.therm-b.anim (COBY Q/RISAQUAD)  1 Cap Oral DAILY    levothyroxine (SYNTHROID) tablet 50 mcg  50 mcg Oral ACB    mirtazapine (REMERON) tablet 7.5 mg  7.5 mg Oral QHS    nystatin (MYCOSTATIN) 100,000 unit/gram powder   Topical BID    pantoprazole (PROTONIX) tablet 40 mg  40 mg Oral DAILY    propranolol (INDERAL) tablet 10 mg  10 mg Oral DAILY    insulin lispro (HUMALOG) injection   SubCUTAneous AC&HS       Review of Systems:  Review of systems not obtained due to patient factors.     Objective:   Vital Signs: Visit Vitals    /80    Pulse 92    Temp 99.1 °F (37.3 °C)    Resp 17    Ht 5' 5\" (1.651 m)    Wt 88.2 kg (194 lb 7.1 oz)    SpO2 100%    BMI 32.36 kg/m2       O2 Device: BIPAP   O2 Flow Rate (L/min): 2 l/min   Temp (24hrs), Av.3 °F (37.4 °C), Min:98.8 °F (37.1 °C), Max:100 °F (37.8 °C)       Intake/Output:   Last shift:       07 - 1900  In: -   Out: 100 [Urine:100]  Last 3 shifts: 1901 - 700  In: -   Out: 0070 [Urine:4550]    Intake/Output Summary (Last 24 hours) at 17 0957  Last data filed at 17 0800   Gross per 24 hour   Intake                0 ml   Output             2650 ml   Net            -2650 ml      Physical Exam:   General:  Alert on bipap   Head:  Normocephalic, without obvious abnormality,    Eyes:  Conjunctivae/corneas clear. PERRL, EOMs intact. Nose: Nares normal. Septum midline. Mucosa normal.    Throat: Lips, mucosa, and tongue normal.   Neck: Supple, symmetrical, trachea midline,        Lungs:   Clear to auscultation bilaterally. Heart:  Regular rate and rhythm, S1, S2 normal, no murmur, click, rub or gallop. Abdomen:   Soft, non-tender. Bowel sounds normal. No masses,  No organomegaly. Extremities: Extremities normal, atraumatic, no cyanosis or edema. Pulses: 2+ and symmetric all extremities.    Skin: Skin color, texture, turgor normal. No rashes or lesions             Data review:     Recent Results (from the past 24 hour(s))   POC G3 - PUL    Collection Time: 17 11:47 AM   Result Value Ref Range    pH (POC) 7.239 (LL) 7.35 - 7.45      pCO2 (POC) 50.6 (H) 35.0 - 45.0 MMHG    pO2 (POC) 105 (H) 80 - 100 MMHG    HCO3 (POC) 21.6 (L) 22 - 26 MMOL/L    sO2 (POC) 97 92 - 97 %    Base deficit (POC) 6 mmol/L    Site RIGHT RADIAL      Device: NASAL CANNULA      Flow rate (POC) 2 L/M    Allens test (POC) YES      Specimen type (POC) ARTERIAL     GLUCOSE, POC    Collection Time: 17 12:04 PM   Result Value Ref Range Glucose (POC) 124 (H) 65 - 100 mg/dL    Performed by Opal Childress    GLUCOSE, POC    Collection Time: 11/28/17  4:26 PM   Result Value Ref Range    Glucose (POC) 126 (H) 65 - 100 mg/dL    Performed by Holger Conway    POC G3 - PUL    Collection Time: 11/28/17  8:58 PM   Result Value Ref Range    FIO2 (POC) 0.40 %    pH (POC) 7.300 (L) 7.35 - 7.45      pCO2 (POC) 48.4 (H) 35.0 - 45.0 MMHG    pO2 (POC) 137 (H) 80 - 100 MMHG    HCO3 (POC) 23.8 22 - 26 MMOL/L    sO2 (POC) 99 (H) 92 - 97 %    Base deficit (POC) 3 mmol/L    Specimen type (POC) ARTERIAL     GLUCOSE, POC    Collection Time: 11/28/17 11:19 PM   Result Value Ref Range    Glucose (POC) 120 (H) 65 - 100 mg/dL    Performed by [address] [place] [address], POC    Collection Time: 11/29/17  7:24 AM   Result Value Ref Range    Glucose (POC) 137 (H) 65 - 100 mg/dL    Performed by Lizette Henson        Imaging:  I have personally reviewed the patients radiographs and have reviewed the reports:  PCXR with left basilar ASD        Jennifer Hernandez MD

## 2017-11-29 NOTE — PROGRESS NOTES
Pharmacy Renal dosing protocol  Day #1 of Zosyn  Indication:  Probable LLL CAP  -hx COPD on augmentin change to Zosyn  Current regimen:  3.375 IV q8h    Recent Labs      17   0251  17   0407   WBC  14.7*  13.6*   CREA  0.83  0.77   BUN  8  7     Est CrCl: 52 ml/min;    Temp (24hrs), Av.3 °F (37.4 °C), Min:98.8 °F (37.1 °C), Max:100 °F (37.8 °C)    Cultures: no new    Plan: Change to 3.375 g IV x1 followed by 10.125 g daily over 24hrs

## 2017-11-29 NOTE — PROGRESS NOTES
Problem: Dysphagia (Adult)  Goal: *Acute Goals and Plan of Care (Insert Text)  Speech Goals   Initiated 11/28/2017  1. Patient will tolerate puree/thin liquid diet with no s/s of aspiration within 7 days discontinued 11/29/2017   2. Patient will participate in solid trials with timely and complete mastication and full oral clearance within 7 days discontinued 11/29/2017   3. Patient will participate in re-assessment of swallow function within 7 days   Speech language pathology dysphagia treatment  Patient: Tj Manrique (3 y.o. female)  Date: 11/29/2017  Diagnosis: CVA (cerebral vascular accident) [facility]) Hemispheric carotid artery syndrome       Precautions:       ASSESSMENT:  Patient with further decline in swallow function compared to yesterday. Patient drowsy but eyes opened to stimuli and she would respond to questions. Patient irritable, demanding a drink. Patient's oral phase characterized by anterior spillage, absent propulsion of magic cup requiring SLP to suction out of oral cavity, and suspected premature spillage of thin. Pharyngeal swallow initiation is suspected to be delayed. Patient with violent cough, watery eyes, increased RR and wet vocal quality following 2 successive straw sips of thin. Once recovered from this, patient offered bite of magic cup (pudding like consistency) and actively accepted, however, she did not propel bolus. Wet vocal quality noted after this as well suspect from passive spillage into pharynx. Recommend holding PO at this time. Patient at high aspiration risk with all PO given altered mental status and lethargy. Progression toward goals:  []         Improving appropriately and progressing toward goals  [x]         Improving slowly and progressing toward goals  []         Not making progress toward goals and plan of care will be adjusted     PLAN:  Recommendations and Planned Interventions:  -- Recommend NPO for today.  Suspect as mentation and alertness improve, she will be able to resume at least a puree diet. -- slp to follow up tomorrow    Patient continues to benefit from skilled intervention to address the above impairments. Continue treatment per established plan of care. Discharge Recommendations: To Be Determined     SUBJECTIVE:   Patient stated I want some water!!!    OBJECTIVE:   Cognitive and Communication Status:  Neurologic State: Drowsy  Orientation Level: Oriented to person  Cognition: Decreased command following  Perception: Appears intact  Perseveration: No perseveration noted  Safety/Judgement: Not assessed  Dysphagia Treatment:  Oral Assessment:  Oral Assessment  Labial:  (wouldn't follow commands to assess)  Oral Hygiene:  (moist)  Lingual: Other (comment) (no command following today)  Velum: Unable to visualize  Mandible: No impairment  P.O. Trials:  Patient Position:  (upright in bed)  Vocal quality prior to P.O.: Wet; No impairment (after PO)  Consistency Presented: Thin liquid;Puree  How Presented: Straw;SLP-fed/presented;Spoon     Bolus Acceptance: No impairment  Bolus Formation/Control: Impaired  Type of Impairment: Delayed; Anterior;Spillage  Propulsion: Delayed (# of seconds); Absent  Oral Residue: Greater than 50% of bolus (with magic cup)  Initiation of Swallow: Delayed (# of seconds)  Laryngeal Elevation: Functional  Aspiration Signs/Symptoms: Strong cough; Watery eyes; Material suctioned; Change vocal quality  Pharyngeal Phase Characteristics: Double swallow (suspect delayed initiation)  Effective Modifications: None          Oral Phase Severity: Moderate-severe  Pharyngeal Phase Severity : Moderate                Pain:  Pain Scale 1: Numeric (0 - 10)  Pain Intensity 1: 0     After treatment:   []              Patient left in no apparent distress sitting up in chair  [x]              Patient left in no apparent distress in bed  [x]              Call bell left within reach  [x]              Nursing notified  []              Caregiver present  [] Bed alarm activated    COMMUNICATION/EDUCATION:     The patients plan of care including recommendations, planned interventions, and recommended diet changes were discussed with: Registered Nurse. []              Posted safety precautions in patient's room.     WES Armijo  Time Calculation: 14 mins

## 2017-11-29 NOTE — PROGRESS NOTES
2000: report received from francisco from [facility].  2100: paged dr Merritt Postal with new abg's. No new orders.

## 2017-11-29 NOTE — PROGRESS NOTES
TRANSFER - OUT REPORT:    Verbal report given to SHREE PALACIO(name) on [place]  being transferred to [facility] (unit) for routine progression of care       Report consisted of patients Situation, Background, Assessment and   Recommendations(SBAR). Information from the following report(s) SBAR, Kardex, [facility] and Recent Results was reviewed with the receiving nurse. Lines:   Peripheral IV 11/25/17 Right Antecubital (Active)   Site Assessment Clean, dry, & intact 11/29/2017  4:00 PM   Phlebitis Assessment 0 11/29/2017  4:00 PM   Infiltration Assessment 0 11/29/2017  4:00 PM   Dressing Status Clean, dry, & intact 11/29/2017  4:00 PM   Dressing Type Transparent;Tape 11/29/2017  4:00 PM   Hub Color/Line Status Blue; Infusing 11/29/2017  4:00 PM   Action Taken Open ports on tubing capped 11/29/2017  4:00 PM   Alcohol Cap Used Yes 11/29/2017  4:00 PM        Opportunity for questions and clarification was provided.       Patient transported with:   Monitor  O2 @ 2 liters  Registered Nurse  [facility]

## 2017-11-29 NOTE — PROGRESS NOTES
Problem: Falls - Risk of  Goal: *Absence of Falls  Document Kristina Fall Risk and appropriate interventions in the flowsheet. Outcome: Progressing Towards Goal  Fall Risk Interventions:  Mobility Interventions: Bed/chair exit alarm, Patient to call before getting OOB    Mentation Interventions: Bed/chair exit alarm, Door open when patient unattended, Evaluate medications/consider consulting pharmacy, More frequent rounding, Reorient patient, Toileting rounds, Update white board, Room close to nurse's station    Medication Interventions: Bed/chair exit alarm, Evaluate medications/consider consulting pharmacy, Patient to call before getting OOB    Elimination Interventions: Bed/chair exit alarm, Call light in reach, Patient to call for help with toileting needs, Toileting schedule/hourly rounds             Problem: TIA/CVA Stroke: Day 2 Until Discharge  Goal: *Tolerating diet  Outcome: Not Progressing Towards Goal  Variance: Patient Condition  Comments: Pt mental status waxes and wanes;  Pt uncooperative at times    Goal: *Ability to perform ADLs and demonstrates progressive mobility and function  Outcome: Not Progressing Towards Goal  Variance: Patient Condition  Comments: Pt uncooperative      Problem: Nutrition Deficit  Goal: *Optimize nutritional status  Outcome: Not Progressing Towards Goal  Variance: Patient Condition  Comments: Pt refuses food at this time and is uncooperative

## 2017-11-29 NOTE — PROGRESS NOTES
Problem: Falls - Risk of  Goal: *Absence of Falls  Document Kristina Fall Risk and appropriate interventions in the flowsheet.    Outcome: Progressing Towards Goal  Fall Risk Interventions:  Mobility Interventions: Bed/chair exit alarm    Mentation Interventions: Door open when patient unattended    Medication Interventions: Bed/chair exit alarm    Elimination Interventions: Call light in reach

## 2017-11-29 NOTE — PROGRESS NOTES
0744- Bedside report received from Baljit Ortega RN, using [employer]. Pt opens eyes to voice. PERRL. Speech slurred. PARHAM weakly and follows commands. Oriented to self and hospital only. Bipap on 15/5 and 40%. O2 sats > 95% at this time. 1000-Pt taken off Bipap and on RA at this time. 1027- Pt O2 sats in low 90's. Placed on Nasal Cannula 2 liters. 1028- Lithium level sent to lab. [id number]- Pt transferred to  with all belongings with RN and PCT. Son, Logan Smoker, called and notified of transfer.

## 2017-11-29 NOTE — PROGRESS NOTES
Levothyroxine 50 mcg po daily  Changed to IV 25 mcg daily  Will adjust dose to 37.5 mcg daily  (75% of oral dose per P&T-approved protocol\"  )    Dose to start on 12/2/17  @ 1200

## 2017-11-29 NOTE — PROGRESS NOTES
TRANSFER - IN REPORT:    Verbal report received from francisco(name) on Ricky Jang  being received from [facility](unit) for routine progression of care      Report consisted of patients Situation, Background, Assessment and   Recommendations(SBAR). Information from the following report(s) SBAR, Kardex and MAR was reviewed with the receiving nurse. Opportunity for questions and clarification was provided. Assessment completed upon patients arrival to unit and care assumed.

## 2017-11-29 NOTE — PROGRESS NOTES
Primary Nurse Hanh Snow RN and Sobeida Rocha RN performed a dual skin assessment on this patient Impairment noted- see wound doc flow sheet  Pt has red/excoriation anal area,dry skin all over,red area under breast-nystatin powder applied

## 2017-11-29 NOTE — PROGRESS NOTES
Hospitalist Progress Note  Germaine Burden MD  Answering service: [phone] OR [phone] from in house phone      Date of Service:  2017  NAME:  Kathrin Wooten  :  1930  MRN:  [id number]      Admission Summary:   79 yo woman with obesity, bipolar disorder, COPD, HLD, HTN, and reported left ovarian cyst was BIBEMS to the ED from [facility] on 17 with sudden onset slurred speech and difficulty swallowing. She presented under Code Stroke. Facility reportedly had C diff outbreak, and patient had diarrhea that tested C diff negative. She was admitted to [facility] for dysarthria.      Interval history / Subjective:      No complaints; more responsive but does not swallow anything      Assessment & Plan:     Sepsis likely due to UTI (POA)  - tachycardia, fever, leucocytosis, tachypnea on admission  - lactic acid WNL  - BCx  NGTD  - UCx  Enterococcus F , C albicans  - CXR  clear  -  On zosyn which will cover both UTI and atelectesis    Dysarthria   - resolved PTA  - CT head wo contrast  no acute findings and no change since 2016  - TTE  EF 60-65%, no RWMA  - b/l carotid duplex  unremarkable  - Neuro following  - MRI brain  no acute; chronic changes  - started ASA 81mg daily  - regular diet per SLP  But stopped eating / drinking will reevaluate once more awake  - TSH, vitamin D, B12 WNL    Left ovarian cyst (POA) - patient/family aware, no further workup    Diarrhea  - none since presentation; reportedly negative C diff at halfway  - empiric levofloxacin, Flagyl started ; de-escalate as indicated  - continue probiotic    COPD  - AHRF yesterday with requiring BIPAP  - no  brnchial spasm  - son reports patient takes nebs q4h while awake but at this time nebs prn are sufficient  - PRN BIPAP and QHS   - ? OHS/ GLORIA    HTN - controlled at this time    Bipolar disorder - controlled on home meds    Rheumatoid arthritis - continue prednisone and methotrexate    DM2 - controlled, metformin on hold; FBS, SSI    Hypothyroidism - TFTs WNL; continue levothyroxine    Obesity, Body mass index is 32.36 kg/(m^2). Code status: full  DVT prophylaxis: SCDs    Care Plan discussed with: Patient/Family and Nurse. Spoke to son Mr Roshni Bruce ( [phone])  Over cell phone  Disposition: TBD. Came from [employer]. Likely dc back to 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  Most recent are:  Visit Vitals    /59    Pulse 97    Temp 99 °F (37.2 °C)    Resp 20    Ht 5' 5\" (1.651 m)    Wt 88.2 kg (194 lb 7.1 oz)    SpO2 97%    BMI 32.36 kg/m2       Intake/Output Summary (Last 24 hours) at 11/29/17 1453  Last data filed at 11/29/17 1400   Gross per 24 hour   Intake            162.4 ml   Output             3050 ml   Net          -2887.6 ml      Physical Examination:     Constitutional:  somnolent but arousable   ENT:  oral mucosa moist, oropharynx benign  Neck supple, no masses   Resp:  CTA bilaterally anteriorly, no wheezing/rhonchi/rales   CV:  regular rhythm, normal rate, no m/r/g appreciated, no peripheral edema, +pulses    GI:  +BS, soft, non distended, non tender, obese     Musculoskeletal:  moves all extremities    Neurologic:  somnolent but arousable, Ox2 to person and place (baseline), follows commands     Skin:  warm, dry  Eyes:  PERRL    Data Review:    Review and/or order of clinical lab test  Review and/or order of tests in the radiology section of CPT  Review and/or order of tests in the medicine section of CPT    Labs:     Recent Labs      11/28/17   0255 11/27/17   0407   WBC  14.7*  13.6*   HGB  11.4*  10.8*   HCT  36.6  34.5*   PLT  309  288     Recent Labs      11/28/17   0251  11/27/17   0407   NA  144  143   K  4.3  3.9   CL  114*  114*   CO2  23  21   BUN  8  7   CREA  0.83  0.77   GLU  108*  145*   CA  9.3  9.2   MG  1.8  1.7   PHOS  2.6   --      No results for input(s): SGOT, GPT, ALT, AP, TBIL, TBILI, TP, ALB, GLOB, GGT, AML, LPSE in the last 72 hours. No lab exists for component: AMYP, HLPSE  No results for input(s): INR, PTP, APTT in the last 72 hours. No lab exists for component: INREXT, INREXT   No results for input(s): FE, TIBC, PSAT, FERR in the last 72 hours. No results found for: FOL, RBCF   No results for input(s): PH, PCO2, PO2 in the last 72 hours. No results for input(s): CPK, CKNDX, TROIQ in the last 72 hours.     No lab exists for component: CPKMB  No results found for: CHOL, CHOLX, CHLST, CHOLV, HDL, LDL, LDLC, DLDLP, TGLX, TRIGL, TRIGP, CHHD, CHHDX  Lab Results   Component Value Date/Time    Glucose (POC) 148 11/29/2017 12:03 PM    Glucose (POC) 137 11/29/2017 07:24 AM    Glucose (POC) 120 11/28/2017 11:19 PM    Glucose (POC) 126 11/28/2017 04:26 PM    Glucose (POC) 124 11/28/2017 12:04 PM     Lab Results   Component Value Date/Time    Color YELLOW/STRAW 11/24/2017 02:45 PM    Appearance CLEAR 11/24/2017 02:45 PM    Specific gravity 1.007 11/24/2017 02:45 PM    pH (UA) 7.5 11/24/2017 02:45 PM    Protein NEGATIVE  11/24/2017 02:45 PM    Glucose NEGATIVE  11/24/2017 02:45 PM    Ketone NEGATIVE  11/24/2017 02:45 PM    Bilirubin NEGATIVE  11/24/2017 02:45 PM    Urobilinogen 1.0 11/24/2017 02:45 PM    Nitrites NEGATIVE  11/24/2017 02:45 PM    Leukocyte Esterase TRACE 11/24/2017 02:45 PM    Epithelial cells FEW 11/24/2017 02:45 PM    Bacteria NEGATIVE  11/24/2017 02:45 PM    WBC 0-4 11/24/2017 02:45 PM    RBC 0-5 11/24/2017 02:45 PM     Medications Reviewed:     Current Facility-Administered Medications   Medication Dose Route Frequency  albuterol-ipratropium (DUO-NEB) 2.5 MG-0.5 MG/3 ML  3 mL Nebulization Q6H RT    piperacillin-tazobactam (ZOSYN) 10.125 g in  mL infusion  10.125 g IntraVENous Q24H    levothyroxine (SYNTHROID) injection 25 mcg  25 mcg IntraVENous Q24H    furosemide (LASIX) injection 20 mg  20 mg IntraVENous DAILY    enoxaparin (LOVENOX) injection 40 mg  40 mg SubCUTAneous Q24H    balsam peru-castor oil (VENELEX)  mg/gram ointment   Topical BID    aspirin chewable tablet 81 mg  81 mg Oral DAILY    polyethylene glycol (MIRALAX) packet 17 g  17 g Oral DAILY PRN    acetaminophen (TYLENOL) tablet 650 mg  650 mg Oral Q6H PRN    albuterol-ipratropium (DUO-NEB) 2.5 MG-0.5 MG/3 ML  3 mL Nebulization Q6H PRN    atorvastatin (LIPITOR) tablet 20 mg  20 mg Oral QHS    guaiFENesin ER (MUCINEX) tablet 1,200 mg  1,200 mg Oral BID    lactobac ac& pc-s.therm-b.anim (COBY Q/RISAQUAD)  1 Cap Oral DAILY    mirtazapine (REMERON) tablet 7.5 mg  7.5 mg Oral QHS    nystatin (MYCOSTATIN) 100,000 unit/gram powder   Topical BID    pantoprazole (PROTONIX) tablet 40 mg  40 mg Oral DAILY    propranolol (INDERAL) tablet 10 mg  10 mg Oral DAILY    insulin lispro (HUMALOG) injection   SubCUTAneous AC&HS    glucose chewable tablet 16 g  4 Tab Oral PRN    dextrose (D50W) injection syrg 12.5-25 g  12.5-25 g IntraVENous PRN    glucagon (GLUCAGEN) injection 1 mg  1 mg IntraMUSCular PRN     ______________________________________________________________________  EXPECTED LENGTH OF STAY: 4d 21h  ACTUAL LENGTH OF STAY:          6                 Jemma Pastor MD

## 2017-11-29 NOTE — PROGRESS NOTES
TRANSFER - IN REPORT:    Verbal report received from Harley(name) on [place]  being received from ICU(unit) for routine progression of care      Report consisted of patients Situation, Background, Assessment and   Recommendations(SBAR). Information from the following report(s) SBAR, Kardex, Procedure Summary, MAR and Recent Results was reviewed with the receiving nurse. Opportunity for questions and clarification was provided. Assessment completed upon patients arrival to unit and care assumed.

## 2017-11-30 LAB
ANION GAP SERPL CALC-SCNC: 8 MMOL/L (ref 5–15)
ARTERIAL PATENCY WRIST A: YES
BASE EXCESS BLD CALC-SCNC: 3 MMOL/L
BASOPHILS # BLD: 0 K/UL (ref 0–0.1)
BASOPHILS NFR BLD: 0 % (ref 0–1)
BDY SITE: ABNORMAL
BUN SERPL-MCNC: 19 MG/DL (ref 6–20)
BUN/CREAT SERPL: 18 (ref 12–20)
CALCIUM SERPL-MCNC: 9.8 MG/DL (ref 8.5–10.1)
CHLORIDE SERPL-SCNC: 116 MMOL/L (ref 97–108)
CO2 SERPL-SCNC: 26 MMOL/L (ref 21–32)
CREAT SERPL-MCNC: 1.03 MG/DL (ref 0.55–1.02)
DIFFERENTIAL METHOD BLD: ABNORMAL
EOSINOPHIL # BLD: 0.1 K/UL (ref 0–0.4)
EOSINOPHIL NFR BLD: 1 % (ref 0–7)
ERYTHROCYTE [DISTWIDTH] IN BLOOD BY AUTOMATED COUNT: 14 % (ref 11.5–14.5)
GAS FLOW.O2 O2 DELIVERY SYS: ABNORMAL L/MIN
GLUCOSE BLD STRIP.AUTO-MCNC: 122 MG/DL (ref 65–100)
GLUCOSE BLD STRIP.AUTO-MCNC: 126 MG/DL (ref 65–100)
GLUCOSE BLD STRIP.AUTO-MCNC: 138 MG/DL (ref 65–100)
GLUCOSE BLD STRIP.AUTO-MCNC: 145 MG/DL (ref 65–100)
GLUCOSE SERPL-MCNC: 124 MG/DL (ref 65–100)
HCO3 BLD-SCNC: 28.1 MMOL/L (ref 22–26)
HCT VFR BLD AUTO: 34.2 % (ref 35–47)
HGB BLD-MCNC: 10.4 G/DL (ref 11.5–16)
LYMPHOCYTES # BLD: 2.8 K/UL (ref 0.8–3.5)
LYMPHOCYTES NFR BLD: 26 % (ref 12–49)
MCH RBC QN AUTO: 29.2 PG (ref 26–34)
MCHC RBC AUTO-ENTMCNC: 30.4 G/DL (ref 30–36.5)
MCV RBC AUTO: 96.1 FL (ref 80–99)
MONOCYTES # BLD: 1.1 K/UL (ref 0–1)
MONOCYTES NFR BLD: 10 % (ref 5–13)
NEUTS SEG # BLD: 6.8 K/UL (ref 1.8–8)
NEUTS SEG NFR BLD: 63 % (ref 32–75)
O2/TOTAL GAS SETTING VFR VENT: 21 %
PCO2 BLD: 50 MMHG (ref 35–45)
PH BLD: 7.36 [PH] (ref 7.35–7.45)
PLATELET # BLD AUTO: 302 K/UL (ref 150–400)
PO2 BLD: 72 MMHG (ref 80–100)
POTASSIUM SERPL-SCNC: 3.8 MMOL/L (ref 3.5–5.1)
RBC # BLD AUTO: 3.56 M/UL (ref 3.8–5.2)
RBC MORPH BLD: ABNORMAL
SAO2 % BLD: 93 % (ref 92–97)
SERVICE CMNT-IMP: ABNORMAL
SODIUM SERPL-SCNC: 150 MMOL/L (ref 136–145)
SPECIMEN TYPE: ABNORMAL
WBC # BLD AUTO: 10.8 K/UL (ref 3.6–11)

## 2017-11-30 PROCEDURE — 74011250637 HC RX REV CODE- 250/637: Performed by: INTERNAL MEDICINE

## 2017-11-30 PROCEDURE — 74011250636 HC RX REV CODE- 250/636: Performed by: HOSPITALIST

## 2017-11-30 PROCEDURE — 36600 WITHDRAWAL OF ARTERIAL BLOOD: CPT

## 2017-11-30 PROCEDURE — 36415 COLL VENOUS BLD VENIPUNCTURE: CPT | Performed by: INTERNAL MEDICINE

## 2017-11-30 PROCEDURE — 74011000250 HC RX REV CODE- 250: Performed by: INTERNAL MEDICINE

## 2017-11-30 PROCEDURE — 85025 COMPLETE CBC W/AUTO DIFF WBC: CPT | Performed by: INTERNAL MEDICINE

## 2017-11-30 PROCEDURE — 65660000000 HC RM CCU STEPDOWN

## 2017-11-30 PROCEDURE — 74011636637 HC RX REV CODE- 636/637: Performed by: FAMILY MEDICINE

## 2017-11-30 PROCEDURE — 80048 BASIC METABOLIC PNL TOTAL CA: CPT | Performed by: INTERNAL MEDICINE

## 2017-11-30 PROCEDURE — 74011000250 HC RX REV CODE- 250: Performed by: FAMILY MEDICINE

## 2017-11-30 PROCEDURE — 82803 BLOOD GASES ANY COMBINATION: CPT

## 2017-11-30 PROCEDURE — 74011000258 HC RX REV CODE- 258: Performed by: HOSPITALIST

## 2017-11-30 PROCEDURE — 74011250637 HC RX REV CODE- 250/637: Performed by: FAMILY MEDICINE

## 2017-11-30 PROCEDURE — 94640 AIRWAY INHALATION TREATMENT: CPT

## 2017-11-30 PROCEDURE — 94660 CPAP INITIATION&MGMT: CPT

## 2017-11-30 PROCEDURE — 82962 GLUCOSE BLOOD TEST: CPT

## 2017-11-30 PROCEDURE — 74011000250 HC RX REV CODE- 250: Performed by: PHYSICIAN ASSISTANT

## 2017-11-30 PROCEDURE — 74011250636 HC RX REV CODE- 250/636: Performed by: INTERNAL MEDICINE

## 2017-11-30 PROCEDURE — 92526 ORAL FUNCTION THERAPY: CPT

## 2017-11-30 RX ORDER — BUDESONIDE 0.5 MG/2ML
500 INHALANT ORAL
Status: DISCONTINUED | OUTPATIENT
Start: 2017-11-30 | End: 2017-12-05 | Stop reason: HOSPADM

## 2017-11-30 RX ORDER — DEXTROSE MONOHYDRATE AND SODIUM CHLORIDE 5; .45 G/100ML; G/100ML
75 INJECTION, SOLUTION INTRAVENOUS CONTINUOUS
Status: DISCONTINUED | OUTPATIENT
Start: 2017-11-30 | End: 2017-12-02

## 2017-11-30 RX ORDER — LEVOTHYROXINE SODIUM 50 UG/1
50 TABLET ORAL
Status: DISCONTINUED | OUTPATIENT
Start: 2017-12-01 | End: 2017-12-05 | Stop reason: HOSPADM

## 2017-11-30 RX ADMIN — INSULIN LISPRO 2 UNITS: 100 INJECTION, SOLUTION INTRAVENOUS; SUBCUTANEOUS at 12:00

## 2017-11-30 RX ADMIN — NYSTATIN: 100000 POWDER TOPICAL at 19:02

## 2017-11-30 RX ADMIN — PIPERACILLIN AND TAZOBACTAM 10.12 G: 3; .375 INJECTION, POWDER, FOR SOLUTION INTRAVENOUS at 14:19

## 2017-11-30 RX ADMIN — FUROSEMIDE 20 MG: 10 INJECTION, SOLUTION INTRAMUSCULAR; INTRAVENOUS at 09:43

## 2017-11-30 RX ADMIN — IPRATROPIUM BROMIDE AND ALBUTEROL SULFATE 3 ML: .5; 3 SOLUTION RESPIRATORY (INHALATION) at 20:01

## 2017-11-30 RX ADMIN — PANTOPRAZOLE SODIUM 40 MG: 40 TABLET, DELAYED RELEASE ORAL at 09:43

## 2017-11-30 RX ADMIN — DEXTROSE MONOHYDRATE AND SODIUM CHLORIDE 75 ML/HR: 5; .45 INJECTION, SOLUTION INTRAVENOUS at 14:09

## 2017-11-30 RX ADMIN — IPRATROPIUM BROMIDE AND ALBUTEROL SULFATE 3 ML: .5; 3 SOLUTION RESPIRATORY (INHALATION) at 01:20

## 2017-11-30 RX ADMIN — Medication 1 CAPSULE: at 09:43

## 2017-11-30 RX ADMIN — BUDESONIDE 500 MCG: 0.5 INHALANT RESPIRATORY (INHALATION) at 11:50

## 2017-11-30 RX ADMIN — ENOXAPARIN SODIUM 40 MG: 40 INJECTION SUBCUTANEOUS at 19:00

## 2017-11-30 RX ADMIN — ASPIRIN 81 MG 81 MG: 81 TABLET ORAL at 09:43

## 2017-11-30 RX ADMIN — IPRATROPIUM BROMIDE AND ALBUTEROL SULFATE 3 ML: .5; 3 SOLUTION RESPIRATORY (INHALATION) at 14:48

## 2017-11-30 RX ADMIN — PROPRANOLOL HYDROCHLORIDE 10 MG: 10 TABLET ORAL at 09:43

## 2017-11-30 RX ADMIN — BUDESONIDE 500 MCG: 0.5 INHALANT RESPIRATORY (INHALATION) at 20:01

## 2017-11-30 NOTE — PROGRESS NOTES
1930: Bedside and Verbal shift change report given to [address] [place] [address] Tyler (oncoming nurse) by Yue Santoyo RN (offgoing nurse). Report included the following information SBAR, Kardex, Intake/Output, MAR, Accordion, Recent Results, Med Rec Status, Cardiac Rhythm NSr and Alarm Parameters . Hourly rounds completed throughout shift.

## 2017-11-30 NOTE — PROGRESS NOTES
Pulmonary, Critical Care, and Sleep Medicine~Progress Note    Name: Roni Corbin MRN: [id number]   : 1930 Hospital: [facility]   Date: 2017 9:53 AM Admission: 2017     IMPRESSION:   · Acute reps failure secondary to LLL CAP, likely OHS/GLORIA and COPD  · Bipolar, on lithium  · CAD  · RA on MTX  · COPD, unknown FEV1. Not bronchospastic       PLAN:   · Lovenox/protonix   · On zosyn   · Duonebs, add Pulmicort   · Bipap at night  · Does not qualify for trilogy  · O2 titration above 90%  · Bronchial hygiene      Daily Progression:    More alert today, on the verge of agitation     I have reviewed the labs and previous days notes. Review of systems not obtained due to patient factors.     OBJECTIVE:     Vital Signs:       Visit Vitals    /41 (BP 1 Location: Left arm, BP Patient Position: At rest)    Pulse 81    Temp 98.5 °F (36.9 °C)    Resp 22    Ht 5' 5\" (1.651 m)    Wt 81.6 kg (179 lb 14.3 oz)    SpO2 97%    BMI 29.94 kg/m2      Temp (24hrs), Av.6 °F (37 °C), Min:97.6 °F (36.4 °C), Max:99.3 °F (37.4 °C)     Intake/Output:     Last shift:      Last 3 shifts:  1901 -  0700  In: 224.8 [I.V.:224.8]  Out: 6693 [Urine:3450]        Intake/Output Summary (Last 24 hours) at 17 0953  Last data filed at 17 0400   Gross per 24 hour   Intake            224.8 ml   Output              800 ml   Net           -575.2 ml       Physical Exam:                                        Exam Findings Other   General: No resp distress noted, appears stated age    HEENT:  No ulcers, JVD not elevated, no cervical LAD    Chest: No pectus deformity, normal chest rise b/l    HEART:  RRR, no murmurs/rubs/gallops    Lungs:  CTA b/l, no rhonchi/crackles/wheeze, diminished BS at bases    ABD: Soft/NT, non rigid mildly distended    EXT: No cyanosis/clubbing/edema, normal peripheral pulses    Skin: No rashes or ulcers, no mottling    Neuro: Alert         Medications:  Current Facility-Administered Medications   Medication Dose Route Frequency    albuterol-ipratropium (DUO-NEB) 2.5 MG-0.5 MG/3 ML  3 mL Nebulization Q6H RT    piperacillin-tazobactam (ZOSYN) 10.125 g in  mL infusion  10.125 g IntraVENous Q24H    [START ON 12/2/2017] levothyroxine (SYNTHROID) injection 37.5 mcg  37.5 mcg IntraVENous Q24H    enoxaparin (LOVENOX) injection 40 mg  40 mg SubCUTAneous Q24H    balsam peru-castor oil (VENELEX)  mg/gram ointment   Topical BID    aspirin chewable tablet 81 mg  81 mg Oral DAILY    polyethylene glycol (MIRALAX) packet 17 g  17 g Oral DAILY PRN    acetaminophen (TYLENOL) tablet 650 mg  650 mg Oral Q6H PRN    albuterol-ipratropium (DUO-NEB) 2.5 MG-0.5 MG/3 ML  3 mL Nebulization Q6H PRN    atorvastatin (LIPITOR) tablet 20 mg  20 mg Oral QHS    guaiFENesin ER (MUCINEX) tablet 1,200 mg  1,200 mg Oral BID    lactobac ac& pc-s.therm-b.anim (COBY Q/RISAQUAD)  1 Cap Oral DAILY    mirtazapine (REMERON) tablet 7.5 mg  7.5 mg Oral QHS    nystatin (MYCOSTATIN) 100,000 unit/gram powder   Topical BID    pantoprazole (PROTONIX) tablet 40 mg  40 mg Oral DAILY    propranolol (INDERAL) tablet 10 mg  10 mg Oral DAILY    insulin lispro (HUMALOG) injection   SubCUTAneous AC&HS    glucose chewable tablet 16 g  4 Tab Oral PRN    dextrose (D50W) injection syrg 12.5-25 g  12.5-25 g IntraVENous PRN    glucagon (GLUCAGEN) injection 1 mg  1 mg IntraMUSCular PRN       Labs:  ABG Recent Labs      11/28/17 2058  11/28/17   1147  11/28/17   0716   PHI  7.300*  7.239*  7.250*   PCO2I  48.4*  50.6*  45.5*   PO2I  137*  105*  91   HCO3I  23.8  21.6*  20.0*   SO2I  99*  97  96   FIO2I  0.40   --    --         CBC Recent Labs      11/30/17   0435  11/28/17   0251   WBC  10.8  14.7*   HGB  10.4*  11.4*   HCT  34.2*  36.6   PLT  302  309   MCV  96.1  93.4   MCH  29.2  54.0        Metabolic  Panel Recent Labs      11/30/17 0435  11/28/17   0251   NA  150*  144   K  3.8  4.3   CL  116*  114*   CO2  26  23   GLU  124*  108*   BUN  19  8   CREA  1.03*  0.83   CA  9.8  9.3   MG   --   1.8   PHOS   --   2.6        Pertinent Labs                WILMER Sapp  11/30/2017

## 2017-11-30 NOTE — PROGRESS NOTES
Occupational Therapy Note:    Spoke with PT this morning and pt is total care for ADL tasks at baseline. Pt has no skilled need for OT intervention at this time. Recommend return to PLOF.   Thank you,       Megan Can, OT

## 2017-11-30 NOTE — PROGRESS NOTES
Problem: Dysphagia (Adult)  Goal: *Acute Goals and Plan of Care (Insert Text)  Speech Goals   Initiated 11/28/2017  1. Patient will tolerate puree/thin liquid diet with no s/s of aspiration within 7 days discontinued 11/29/2017   2. Patient will participate in solid trials with timely and complete mastication and full oral clearance within 7 days discontinued 11/29/2017   3. Patient will participate in re-assessment of swallow function within 7 days   Speech language pathology dysphagia treatment  Patient: Daniela Golden (61 y.o. female)  Date: 11/30/2017  Diagnosis: CVA (cerebral vascular accident) [facility]) Hemispheric carotid artery syndrome       Precautions: swallow      ASSESSMENT:  Patient received in bed with eyes closed. Patient responded to SLP questions, however kept eyes closed. Patient oriented to person only. Provided scant amount of applesauce on spoon, and patient with severe oral dysphagia. Patient actively accepted bolus, however patient with prolonged \"mastication\" of applesauce with absent posterior propulsion. Despite max verbal/visual/tactile cues for >2 minutes, patient did not initiate a swallow. Bolus removed from oral cavity with swab. Patient is at high risk for aspiration secondary to cognitive status, poor alertness, and severe oral dysphagia. Recommend continue NPO, and SLP to follow up tomorrow. Progression toward goals:  []         Improving appropriately and progressing toward goals  [x]         Improving slowly and progressing toward goals  []         Not making progress toward goals and plan of care will be adjusted     PLAN:  Recommendations and Planned Interventions:  --Strict NPO  --Frequent oral care  --SLP to follow up tomorrow for re-evaluation of swallow function  Patient continues to benefit from skilled intervention to address the above impairments. Continue treatment per established plan of care. Discharge Recommendations:   To Be Determined     SUBJECTIVE:   Patient stated I already swallowed!  despite not swallowing. .    OBJECTIVE:   Cognitive and Communication Status:  Neurologic State: Confused, Drowsy, Alert, Irritable (attempts to speak to SLP despite eyes being closed)  Orientation Level: Oriented to person, Disoriented to time, Disoriented to situation, Disoriented to place  Cognition: Decreased command following, Decreased attention/concentration  Perception: Appears intact  Perseveration: No perseveration noted  Safety/Judgement: Not assessed  Dysphagia Treatment:     P.O. Trials:  Patient Position: upright in bed  Vocal quality prior to P.O.: Low volume  Consistency Presented: Chris  How Presented: SLP-fed/presented;Spoon (scant amount)     Bolus Acceptance: No impairment  Bolus Formation/Control: Impaired  Type of Impairment: Other (comment) (prolonged \"mastication\")  Propulsion: Absent  Oral Residue: Greater than 50% of bolus (removed from oral cavity with swab)  Initiation of Swallow: Absent  Laryngeal Elevation: Absent  Aspiration Signs/Symptoms: None  Pharyngeal Phase Characteristics: No impairment, issues, or problems                            Pain:  Pain Scale 1: Numeric (0 - 10)  Pain Intensity 1: 0     After treatment:   []              Patient left in no apparent distress sitting up in chair  [x]              Patient left in no apparent distress in bed  [x]              Call bell left within reach  [x]              Nursing notified  [x]              Caregiver present  []              Bed alarm activated    COMMUNICATION/EDUCATION:   The patients plan of care including recommendations, planned interventions, and recommended diet changes were discussed with: Registered Nurse.     Ryland Gottlieb SLP  Time Calculation: 20 mins

## 2017-11-30 NOTE — PROGRESS NOTES
0830- bipap converted to room air-O2 sat 96%-for ABG this am  Npo maintained-speech eval for swallowing to see pt

## 2017-11-30 NOTE — PROGRESS NOTES
NUTRITION       Brief note. Chart reviewed, discussed with RN and team during interdisciplinary rounds. PO documentation is incomplete; however, pt was made NPO yesterday secondary to mental status and lethargy. Magic Cup had to be suctioned out of pt's mouth during SLP eval.      Once mentation is improved, plan is to resume pureed diet order. Noted 20# weight difference x 2 days (15# x 1 day). Noted SCD's and two pillows removed, but this is still a large weight loss. Did not recalculate estimated needs, but if weight remains consistent (RN to son valencia), then will adjust as appropriate. Once diet advanced, would continue to feed with all meals and use Boost Pudding with meds. Last 3 Recorded Weights in this Encounter    11/28/17 0300 11/29/17 0702 11/30/17 0250   Weight: 90.6 kg (199 lb 11.8 oz) 88.2 kg (194 lb 7.1 oz) 81.6 kg (179 lb 14.3 oz)     Patient Vitals for the past 100 hrs:   % Diet Eaten   11/29/17 1843 0 %     Estimated Nutrition Needs:   Kcals/day: 6299 Kcals/day (1565-1696kcal)  Protein: 96 g (1.1g/kg)  Fluid: 1565 ml (1ml/kcal)     Based On: Shyla Leary (x 1.2-1.3)  Weight Used: UBW (87.7kg)    RD to follow.     [address]

## 2017-11-30 NOTE — PROGRESS NOTES
Problem: Falls - Risk of  Goal: *Absence of Falls  Document Kristina Fall Risk and appropriate interventions in the flowsheet.    Outcome: Progressing Towards Goal  Fall Risk Interventions:  Mobility Interventions: Patient to call before getting OOB    Mentation Interventions: Door open when patient unattended, Bed/chair exit alarm    Medication Interventions: Evaluate medications/consider consulting pharmacy, Patient to call before getting OOB    Elimination Interventions: Patient to call for help with toileting needs

## 2017-11-30 NOTE — PROGRESS NOTES
Hospitalist Progress Note  Ashutosh Rao MD  Answering service: [phone] OR [phone] from in house phone      Date of Service:  2017  NAME:  Tasneem Best  :  1930  MRN:  [id number]      Admission Summary:   79 yo woman with obesity, bipolar disorder, COPD, HLD, HTN, and reported left ovarian cyst was BIBEMS to the ED from [facility] on 17 with sudden onset slurred speech and difficulty swallowing. She presented under Code Stroke. Facility reportedly had C diff outbreak, and patient had diarrhea that tested C diff negative. She was admitted to [facility] for dysarthria.      Interval history / Subjective:      No complaints; more responsive awake, on BIPAP    Assessment & Plan:     Sepsis likely due to UTI (POA)  - tachycardia, fever, leucocytosis, tachypnea on admission  - lactic acid WNL  - BCx  NGTD  - UCx  Enterococcus F , C albicans  - CXR  clear  -  On zosyn which will cover both UTI and atelectesis    Dysarthria   - resolved PTA  - CT head wo contrast  no acute findings and no change since 2016  - TTE  EF 60-65%, no RWMA  - b/l carotid duplex  unremarkable  - Neuro following  - MRI brain  no acute; chronic changes  - started ASA 81mg daily  - regular diet per SLP  But stopped eating / drinking will reevaluate once more awake  - TSH, vitamin D, B12 WNL    Left ovarian cyst (POA) - patient/family aware, no further workup    Diarrhea  - none since presentation; reportedly negative C diff at [facility]  - empiric levofloxacin, Flagyl started ; de-escalate  - continue probiotic    COPD  - AHRF yesterday with requiring BIPAP  - son reports patient takes nebs q4h while awake but at this time nebs prn are sufficient  - PRN BIPAP and QHS  - ? [facility]/ [facility]  - Does not qualify for trilogy per pulmonary    HTN - controlled at this time    Bipolar disorder - controlled on home meds    Rheumatoid arthritis - continue prednisone and methotrexate    DM2 - controlled, metformin on hold; FBS, SSI    Hypothyroidism - TFTs WNL; continue levothyroxine    Obesity, Body mass index is 29.94 kg/(m^2). Code status: full  DVT prophylaxis: SCDs    Care Plan discussed with: Patient/Family and Nurse. Spoke to son Mr Adrienne Banks ( [phone])  Over cell phone  Disposition: TBD. Came from [employer]. 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  Most recent are:  Visit Vitals    /41 (BP 1 Location: Left arm, BP Patient Position: At rest)    Pulse 81    Temp 98.5 °F (36.9 °C)    Resp 22    Ht 5' 5\" (1.651 m)    Wt 81.6 kg (179 lb 14.3 oz)    SpO2 97%    BMI 29.94 kg/m2       Intake/Output Summary (Last 24 hours) at 11/30/17 1047  Last data filed at 11/30/17 0400   Gross per 24 hour   Intake            224.8 ml   Output              800 ml   Net           -575.2 ml      Physical Examination:     Constitutional:  somnolent but arousable   ENT:  oral mucosa moist, oropharynx benign  Neck supple, no masses   Resp:  CTA bilaterally anteriorly   CV:  regular rhythm, normal rate,    GI:  +BS, soft, non distended, non tender, obese     Musculoskeletal:  moves all extremities    Neurologic:  somnolent but arousable, Ox2 to person and place (baseline), follows commands     Skin:  warm, dry  Eyes:  PERRL    Data Review:    Review and/or order of clinical lab test  Review and/or order of tests in the radiology section of CPT  Review and/or order of tests in the medicine section of CPT    Labs:     Recent Labs      11/30/17   0435  11/28/17   0251   WBC  10.8  14.7* HGB  10.4*  11.4*   HCT  34.2*  36.6   PLT  302  309     Recent Labs      11/30/17   0435  11/28/17   0251   NA  150*  144   K  3.8  4.3   CL  116*  114*   CO2  26  23   BUN  19  8   CREA  1.03*  0.83   GLU  124*  108*   CA  9.8  9.3   MG   --   1.8   PHOS   --   2.6     No results for input(s): SGOT, GPT, ALT, AP, TBIL, TBILI, TP, ALB, GLOB, GGT, AML, LPSE in the last 72 hours. No lab exists for component: AMYP, HLPSE  No results for input(s): INR, PTP, APTT in the last 72 hours. No lab exists for component: INREXT, INREXT   No results for input(s): FE, TIBC, PSAT, FERR in the last 72 hours. No results found for: FOL, RBCF   No results for input(s): PH, PCO2, PO2 in the last 72 hours. No results for input(s): CPK, CKNDX, TROIQ in the last 72 hours.     No lab exists for component: CPKMB  No results found for: CHOL, CHOLX, CHLST, CHOLV, HDL, LDL, LDLC, DLDLP, TGLX, TRIGL, TRIGP, CHHD, CHHDX  Lab Results   Component Value Date/Time    Glucose (POC) 122 11/30/2017 06:26 AM    Glucose (POC) 121 11/29/2017 09:12 PM    Glucose (POC) 122 11/29/2017 04:46 PM    Glucose (POC) 148 11/29/2017 12:03 PM    Glucose (POC) 137 11/29/2017 07:24 AM     Lab Results   Component Value Date/Time    Color YELLOW/STRAW 11/24/2017 02:45 PM    Appearance CLEAR 11/24/2017 02:45 PM    Specific gravity 1.007 11/24/2017 02:45 PM    pH (UA) 7.5 11/24/2017 02:45 PM    Protein NEGATIVE  11/24/2017 02:45 PM    Glucose NEGATIVE  11/24/2017 02:45 PM    Ketone NEGATIVE  11/24/2017 02:45 PM    Bilirubin NEGATIVE  11/24/2017 02:45 PM    Urobilinogen 1.0 11/24/2017 02:45 PM    Nitrites NEGATIVE  11/24/2017 02:45 PM    Leukocyte Esterase TRACE 11/24/2017 02:45 PM    Epithelial cells FEW 11/24/2017 02:45 PM    Bacteria NEGATIVE  11/24/2017 02:45 PM    WBC 0-4 11/24/2017 02:45 PM    RBC 0-5 11/24/2017 02:45 PM     Medications Reviewed:     Current Facility-Administered Medications   Medication Dose Route Frequency    budesonide (PULMICORT) 500 mcg/2 ml nebulizer suspension  500 mcg Nebulization BID RT    albuterol-ipratropium (DUO-NEB) 2.5 MG-0.5 MG/3 ML  3 mL Nebulization Q6H RT    piperacillin-tazobactam (ZOSYN) 10.125 g in  mL infusion  10.125 g IntraVENous Q24H    [START ON 12/2/2017] levothyroxine (SYNTHROID) injection 37.5 mcg  37.5 mcg IntraVENous Q24H    enoxaparin (LOVENOX) injection 40 mg  40 mg SubCUTAneous Q24H    balsam peru-castor oil (VENELEX)  mg/gram ointment   Topical BID    aspirin chewable tablet 81 mg  81 mg Oral DAILY    polyethylene glycol (MIRALAX) packet 17 g  17 g Oral DAILY PRN    acetaminophen (TYLENOL) tablet 650 mg  650 mg Oral Q6H PRN    albuterol-ipratropium (DUO-NEB) 2.5 MG-0.5 MG/3 ML  3 mL Nebulization Q6H PRN    atorvastatin (LIPITOR) tablet 20 mg  20 mg Oral QHS    guaiFENesin ER (MUCINEX) tablet 1,200 mg  1,200 mg Oral BID    lactobac ac& pc-s.therm-b.anim (COBY Q/RISAQUAD)  1 Cap Oral DAILY    mirtazapine (REMERON) tablet 7.5 mg  7.5 mg Oral QHS    nystatin (MYCOSTATIN) 100,000 unit/gram powder   Topical BID    pantoprazole (PROTONIX) tablet 40 mg  40 mg Oral DAILY    propranolol (INDERAL) tablet 10 mg  10 mg Oral DAILY    insulin lispro (HUMALOG) injection   SubCUTAneous AC&HS    glucose chewable tablet 16 g  4 Tab Oral PRN    dextrose (D50W) injection syrg 12.5-25 g  12.5-25 g IntraVENous PRN    glucagon (GLUCAGEN) injection 1 mg  1 mg IntraMUSCular PRN     ______________________________________________________________________  EXPECTED LENGTH OF STAY: 4d 21h  ACTUAL LENGTH OF STAY:          7                 Rolf Tolentino MD

## 2017-11-30 NOTE — PROGRESS NOTES
Physical Therapy Note:  Re-consult received and chart reviewed. Patient has attempted to be seen x2 during this admission where she adamantly refused. Per chart she lives at the [facility] where she has been since 4/2016. Per patient report, she requires maximum-total assistance for transfers to her wheelchair at baseline. Acute skilled services are not indicated at this time. Will sign off.    Vertie Fabry, PT, DPT

## 2017-12-01 ENCOUNTER — APPOINTMENT (OUTPATIENT)
Dept: CT IMAGING | Age: 82
DRG: 871 | End: 2017-12-01
Attending: FAMILY MEDICINE
Payer: MEDICARE

## 2017-12-01 LAB
APPEARANCE UR: CLEAR
BACTERIA URNS QL MICRO: NEGATIVE /HPF
BILIRUB UR QL: NEGATIVE
COLOR UR: ABNORMAL
EPITH CASTS URNS QL MICRO: ABNORMAL /LPF
GLUCOSE BLD STRIP.AUTO-MCNC: 124 MG/DL (ref 65–100)
GLUCOSE BLD STRIP.AUTO-MCNC: 145 MG/DL (ref 65–100)
GLUCOSE BLD STRIP.AUTO-MCNC: 152 MG/DL (ref 65–100)
GLUCOSE BLD STRIP.AUTO-MCNC: 173 MG/DL (ref 65–100)
GLUCOSE UR STRIP.AUTO-MCNC: NEGATIVE MG/DL
HGB UR QL STRIP: ABNORMAL
KETONES UR QL STRIP.AUTO: NEGATIVE MG/DL
LEUKOCYTE ESTERASE UR QL STRIP.AUTO: NEGATIVE
NITRITE UR QL STRIP.AUTO: NEGATIVE
PH UR STRIP: 6 [PH] (ref 5–8)
PROT UR STRIP-MCNC: 30 MG/DL
RBC #/AREA URNS HPF: ABNORMAL /HPF (ref 0–5)
SERVICE CMNT-IMP: ABNORMAL
SP GR UR REFRACTOMETRY: 1.02 (ref 1–1.03)
UA: UC IF INDICATED,UAUC: ABNORMAL
UROBILINOGEN UR QL STRIP.AUTO: 0.2 EU/DL (ref 0.2–1)
WBC URNS QL MICRO: ABNORMAL /HPF (ref 0–4)

## 2017-12-01 PROCEDURE — 77010033678 HC OXYGEN DAILY

## 2017-12-01 PROCEDURE — 74011250636 HC RX REV CODE- 250/636: Performed by: INTERNAL MEDICINE

## 2017-12-01 PROCEDURE — 74011250636 HC RX REV CODE- 250/636: Performed by: HOSPITALIST

## 2017-12-01 PROCEDURE — 74176 CT ABD & PELVIS W/O CONTRAST: CPT

## 2017-12-01 PROCEDURE — 82962 GLUCOSE BLOOD TEST: CPT

## 2017-12-01 PROCEDURE — 94664 DEMO&/EVAL PT USE INHALER: CPT

## 2017-12-01 PROCEDURE — 51798 US URINE CAPACITY MEASURE: CPT

## 2017-12-01 PROCEDURE — 74011636637 HC RX REV CODE- 636/637: Performed by: FAMILY MEDICINE

## 2017-12-01 PROCEDURE — 92526 ORAL FUNCTION THERAPY: CPT

## 2017-12-01 PROCEDURE — 74011250637 HC RX REV CODE- 250/637: Performed by: INTERNAL MEDICINE

## 2017-12-01 PROCEDURE — 77030011943

## 2017-12-01 PROCEDURE — 81001 URINALYSIS AUTO W/SCOPE: CPT | Performed by: UROLOGY

## 2017-12-01 PROCEDURE — 74011000250 HC RX REV CODE- 250: Performed by: PHYSICIAN ASSISTANT

## 2017-12-01 PROCEDURE — 65660000000 HC RM CCU STEPDOWN

## 2017-12-01 PROCEDURE — 74011250637 HC RX REV CODE- 250/637: Performed by: FAMILY MEDICINE

## 2017-12-01 PROCEDURE — 74011000250 HC RX REV CODE- 250: Performed by: INTERNAL MEDICINE

## 2017-12-01 PROCEDURE — 94640 AIRWAY INHALATION TREATMENT: CPT

## 2017-12-01 PROCEDURE — 77030037404 HC CATH FOL COUDE SURESTEP BARD -B

## 2017-12-01 PROCEDURE — 77030034850

## 2017-12-01 PROCEDURE — 74011250637 HC RX REV CODE- 250/637: Performed by: HOSPITALIST

## 2017-12-01 RX ORDER — MICONAZOLE NITRATE 20 MG/G
CREAM TOPICAL EVERY 12 HOURS
Status: DISCONTINUED | OUTPATIENT
Start: 2017-12-01 | End: 2017-12-01 | Stop reason: SDUPTHER

## 2017-12-01 RX ORDER — IPRATROPIUM BROMIDE AND ALBUTEROL SULFATE 2.5; .5 MG/3ML; MG/3ML
3 SOLUTION RESPIRATORY (INHALATION)
Status: DISCONTINUED | OUTPATIENT
Start: 2017-12-02 | End: 2017-12-05 | Stop reason: HOSPADM

## 2017-12-01 RX ORDER — MICONAZOLE NITRATE 20 MG/G
CREAM TOPICAL EVERY 12 HOURS
Status: DISCONTINUED | OUTPATIENT
Start: 2017-12-01 | End: 2017-12-05 | Stop reason: HOSPADM

## 2017-12-01 RX ORDER — FLUCONAZOLE 100 MG/1
200 TABLET ORAL DAILY
Status: COMPLETED | OUTPATIENT
Start: 2017-12-02 | End: 2017-12-04

## 2017-12-01 RX ORDER — DOXYLAMINE SUCCINATE 25 MG
TABLET ORAL 2 TIMES DAILY
Status: DISCONTINUED | OUTPATIENT
Start: 2017-12-01 | End: 2017-12-01 | Stop reason: SDUPTHER

## 2017-12-01 RX ADMIN — CASTOR OIL AND BALSAM, PERU: 788; 87 OINTMENT TOPICAL at 18:00

## 2017-12-01 RX ADMIN — ATORVASTATIN CALCIUM 20 MG: 20 TABLET, FILM COATED ORAL at 22:14

## 2017-12-01 RX ADMIN — IPRATROPIUM BROMIDE AND ALBUTEROL SULFATE 3 ML: .5; 3 SOLUTION RESPIRATORY (INHALATION) at 02:43

## 2017-12-01 RX ADMIN — IPRATROPIUM BROMIDE AND ALBUTEROL SULFATE 3 ML: .5; 3 SOLUTION RESPIRATORY (INHALATION) at 15:44

## 2017-12-01 RX ADMIN — IPRATROPIUM BROMIDE AND ALBUTEROL SULFATE 3 ML: .5; 3 SOLUTION RESPIRATORY (INHALATION) at 20:28

## 2017-12-01 RX ADMIN — INSULIN LISPRO 2 UNITS: 100 INJECTION, SOLUTION INTRAVENOUS; SUBCUTANEOUS at 18:02

## 2017-12-01 RX ADMIN — MICONAZOLE NITRATE: 20 CREAM TOPICAL at 15:00

## 2017-12-01 RX ADMIN — NYSTATIN: 100000 POWDER TOPICAL at 09:00

## 2017-12-01 RX ADMIN — INSULIN LISPRO 2 UNITS: 100 INJECTION, SOLUTION INTRAVENOUS; SUBCUTANEOUS at 12:12

## 2017-12-01 RX ADMIN — BUDESONIDE 500 MCG: 0.5 INHALANT RESPIRATORY (INHALATION) at 20:28

## 2017-12-01 RX ADMIN — IPRATROPIUM BROMIDE AND ALBUTEROL SULFATE 3 ML: .5; 3 SOLUTION RESPIRATORY (INHALATION) at 08:49

## 2017-12-01 RX ADMIN — PIPERACILLIN AND TAZOBACTAM 10.12 G: 3; .375 INJECTION, POWDER, FOR SOLUTION INTRAVENOUS at 12:37

## 2017-12-01 RX ADMIN — ENOXAPARIN SODIUM 40 MG: 40 INJECTION SUBCUTANEOUS at 18:28

## 2017-12-01 RX ADMIN — INSULIN LISPRO 2 UNITS: 100 INJECTION, SOLUTION INTRAVENOUS; SUBCUTANEOUS at 08:07

## 2017-12-01 RX ADMIN — BUDESONIDE 500 MCG: 0.5 INHALANT RESPIRATORY (INHALATION) at 08:49

## 2017-12-01 RX ADMIN — MICONAZOLE NITRATE: 20 CREAM TOPICAL at 22:17

## 2017-12-01 RX ADMIN — MIRTAZAPINE 7.5 MG: 15 TABLET, FILM COATED ORAL at 22:14

## 2017-12-01 NOTE — PROGRESS NOTES
Have reached out to [facility] at [phone] to coordinate Saturday discharge. Anticipate bi-pap at night for Ms. Deborah Hunt. Awaiting call back to discuss transition strategies for 12/1/17. Spoke with Ms. Deborah Hunt' son Karlie Wharton and have provided an update on care coordination efforts. Mr. Deborah Hunt requests his cell phone to be added to contact information:  [phone] and action has been completed.         Logan Dumont RN

## 2017-12-01 NOTE — PROGRESS NOTES
Problem: Pressure Injury - Risk of  Goal: *Prevention of pressure ulcer  Outcome: Progressing Towards Goal  Maintain Q2 turning schedule. Keeping linens dry. Moisture barrier to protect from incontinence. Will continue to monitor skin. 1030- After 3 attempts made to place king, paged urology Eren Liz who notified me a physician will come and place the king. 1425- Dr. Carlson Scale at bedside placing king  1850- Bladder scan showing 1406 mL in bladder, Patient distended and audibly screams when nurse applies pressure to bladder. paged urology awaiting call back. 1924- Paged salvador, orders received. Bedside and Verbal shift change report given to [address] [place] [address] (oncoming nurse) by Emerita Bautista (offgoing nurse). Report included the following information SBAR, Kardex, Intake/Output, MAR, Recent Results and Cardiac Rhythm NSR.

## 2017-12-01 NOTE — PROGRESS NOTES
Hospitalist Progress Note  Leah Santillan MD  Answering service: [phone] OR [phone] from in house phone      Date of Service:  2017  NAME:  Richardean Apgar  :  1930  MRN:  [id number]      Admission Summary:   79 yo woman with obesity, bipolar disorder, COPD, HLD, HTN, and reported left ovarian cyst was BIBEMS to the ED from [facility] on 17 with sudden onset slurred speech and difficulty swallowing. She presented under Code Stroke. Facility reportedly had C diff outbreak, and patient had diarrhea that tested C diff negative. She was admitted to [facility] for dysarthria.      Interval history / Subjective:      No complaints; more responsive awake, off BIPAP    Assessment & Plan:     Sepsis likely due to UTI (POA)  - tachycardia, fever, leucocytosis, tachypnea on admission  - lactic acid WNL  - BCx  NGTD  - UCx  Enterococcus F , C albicans  - CXR  clear  -  On zosyn which will cover both UTI and atelectasis  - added diflucan for 3 days for genial fungal infection    Dysarthria   - resolved PTA  - CT head wo contrast  no acute findings and no change since 2016  - TTE  EF 60-65%, no RWMA  - b/l carotid duplex  unremarkable  - Neuro following  - MRI brain  no acute; chronic changes  - started ASA 81mg daily  - regular diet per SLP  But stopped eating / drinking will reevaluate once more awake  - TSH, vitamin D, B12 WNL    Left ovarian cyst (POA) - patient/family aware, no further workup    Diarrhea  - none since presentation; reportedly negative C diff at [facility]  - empiric levofloxacin, Flagyl started ; de-escalate  - continue probiotic    COPD  - AHRF yesterday with requiring BIPAP  - son reports patient takes nebs q4h while awake but at this time nebs prn are sufficient  - PRN BIPAP and QHS  - ? [facility]/ [facility]  - Does not qualify for trilogy per pulmonary    HTN - controlled at this time    Bipolar disorder - controlled on home meds    Rheumatoid arthritis - continue prednisone and methotrexate    DM2 - controlled, metformin on hold; FBS, SSI    Hypothyroidism - TFTs WNL; continue levothyroxine    Urinary retention s/p straight cath still retaining placed king cath will need voiding trial     Obesity, Body mass index is 30.6 kg/(m^2). Code status: full  DVT prophylaxis: SCDs    Care Plan discussed with: Patient/Family and Nurse. Spoke to son Mr Ann Villa ( [phone])  Over cell phone  Disposition: TBD. Came from [employer]. 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  Most recent are:  Visit Vitals    /61 (BP 1 Location: Left arm, BP Patient Position: At rest)    Pulse 74    Temp 98.4 °F (36.9 °C)    Resp 18    Ht 5' 5\" (1.651 m)    Wt 83.4 kg (183 lb 13.8 oz)    SpO2 98%    BMI 30.6 kg/m2       Intake/Output Summary (Last 24 hours) at 12/01/17 1342  Last data filed at 12/01/17 0700   Gross per 24 hour   Intake              870 ml   Output             1900 ml   Net            -1030 ml      Physical Examination:     Constitutional:  somnolent but arousable   ENT:  oral mucosa moist, oropharynx benign  Neck supple, no masses   Resp:  CTA bilaterally anteriorly   CV:  regular rhythm, normal rate,    GI:  +BS, soft, non distended, non tender, obese     Musculoskeletal:  moves all extremities    Neurologic:  somnolent but arousable, Ox2 to person and place (baseline), follows commands     Skin:  warm, dry  Eyes:  PERRL    Data Review:    Review and/or order of clinical lab test  Review and/or order of tests in the radiology section of CPT  Review and/or order of tests in the medicine section of CPT    Labs:     Recent Labs      11/30/17   0435   WBC  10.8   HGB  10.4*   HCT  34.2*   PLT  302     Recent Labs      11/30/17   0435   NA  150*   K  3.8   CL  116*   CO2  26   BUN  19   CREA  1.03*   GLU  124*   CA  9.8     No results for input(s): SGOT, GPT, ALT, AP, TBIL, TBILI, TP, ALB, GLOB, GGT, AML, LPSE in the last 72 hours. No lab exists for component: AMYP, HLPSE  No results for input(s): INR, PTP, APTT in the last 72 hours. No lab exists for component: INREXT, INREXT   No results for input(s): FE, TIBC, PSAT, FERR in the last 72 hours. No results found for: FOL, RBCF   No results for input(s): PH, PCO2, PO2 in the last 72 hours. No results for input(s): CPK, CKNDX, TROIQ in the last 72 hours.     No lab exists for component: CPKMB  No results found for: CHOL, CHOLX, CHLST, CHOLV, HDL, LDL, LDLC, DLDLP, TGLX, TRIGL, TRIGP, CHHD, CHHDX  Lab Results   Component Value Date/Time    Glucose (POC) 152 12/01/2017 11:56 AM    Glucose (POC) 145 12/01/2017 07:20 AM    Glucose (POC) 126 11/30/2017 10:01 PM    Glucose (POC) 138 11/30/2017 04:36 PM    Glucose (POC) 145 11/30/2017 11:58 AM     Lab Results   Component Value Date/Time    Color YELLOW/STRAW 12/01/2017 11:57 AM    Appearance CLEAR 12/01/2017 11:57 AM    Specific gravity 1.023 12/01/2017 11:57 AM    pH (UA) 6.0 12/01/2017 11:57 AM    Protein 30 12/01/2017 11:57 AM    Glucose NEGATIVE  12/01/2017 11:57 AM    Ketone NEGATIVE  12/01/2017 11:57 AM    Bilirubin NEGATIVE  12/01/2017 11:57 AM    Urobilinogen 0.2 12/01/2017 11:57 AM    Nitrites NEGATIVE  12/01/2017 11:57 AM    Leukocyte Esterase NEGATIVE  12/01/2017 11:57 AM    Epithelial cells FEW 12/01/2017 11:57 AM    Bacteria NEGATIVE  12/01/2017 11:57 AM    WBC 0-4 12/01/2017 11:57 AM    RBC 5-10 12/01/2017 11:57 AM     Medications Reviewed:     Current Facility-Administered Medications   Medication Dose Route Frequency    [START ON 12/2/2017] fluconazole (DIFLUCAN) tablet 200 mg  200 mg Oral DAILY    miconazole (MICOTIN) 2 % cream   Topical BID    budesonide (PULMICORT) 500 mcg/2 ml nebulizer suspension  500 mcg Nebulization BID RT    dextrose 5 % - 0.45% NaCl infusion  75 mL/hr IntraVENous CONTINUOUS    levothyroxine (SYNTHROID) tablet 50 mcg  50 mcg Oral 6am    albuterol-ipratropium (DUO-NEB) 2.5 MG-0.5 MG/3 ML  3 mL Nebulization Q6H RT    piperacillin-tazobactam (ZOSYN) 10.125 g in  mL infusion  10.125 g IntraVENous Q24H    enoxaparin (LOVENOX) injection 40 mg  40 mg SubCUTAneous Q24H    balsam peru-castor oil (VENELEX)  mg/gram ointment   Topical BID    aspirin chewable tablet 81 mg  81 mg Oral DAILY    polyethylene glycol (MIRALAX) packet 17 g  17 g Oral DAILY PRN    acetaminophen (TYLENOL) tablet 650 mg  650 mg Oral Q6H PRN    albuterol-ipratropium (DUO-NEB) 2.5 MG-0.5 MG/3 ML  3 mL Nebulization Q6H PRN    atorvastatin (LIPITOR) tablet 20 mg  20 mg Oral QHS    guaiFENesin ER (MUCINEX) tablet 1,200 mg  1,200 mg Oral BID    lactobac ac& pc-s.therm-b.anim (COBY Q/RISAQUAD)  1 Cap Oral DAILY    mirtazapine (REMERON) tablet 7.5 mg  7.5 mg Oral QHS    pantoprazole (PROTONIX) tablet 40 mg  40 mg Oral DAILY    propranolol (INDERAL) tablet 10 mg  10 mg Oral DAILY    insulin lispro (HUMALOG) injection   SubCUTAneous AC&HS    glucose chewable tablet 16 g  4 Tab Oral PRN    dextrose (D50W) injection syrg 12.5-25 g  12.5-25 g IntraVENous PRN    glucagon (GLUCAGEN) injection 1 mg  1 mg IntraMUSCular PRN     ______________________________________________________________________  EXPECTED LENGTH OF STAY: 4d 21h  ACTUAL LENGTH OF STAY:          8                 Elian Ma MD

## 2017-12-01 NOTE — PROGRESS NOTES
Problem: Dysphagia (Adult)  Goal: *Acute Goals and Plan of Care (Insert Text)  Speech Goals   Initiated 11/28/2017  1. Patient will tolerate puree/thin liquid diet with no s/s of aspiration within 7 days discontinued 11/29/2017 RESUME  12/1/2017   2. Patient will participate in solid trials with timely and complete mastication and full oral clearance within 7 days discontinued 11/29/2017   3. Patient will participate in re-assessment of swallow function within 7 days met 12/1/2017   Speech language pathology dysphagia treatment  Patient: Neema Shaikh (30 y.o. female)  Date: 12/1/2017  Diagnosis: CVA (cerebral vascular accident) [facility]) Hemispheric carotid artery syndrome       Precautions: aspiration      ASSESSMENT:  SLP received page to re-assess patient as she is much calmer and cooperative compared to this morning. Patient's son at bedside. RN and son both reporting that patient consumed 2 cups of applesauce without difficulty prior to SLP arrival.   Patient presents with mild-moderate oral and mild pharyngeal dysphagia. Oral dysphagia characterized by prolonged bolus manipulation and delayed posterior propulsion of puree but complete oral clearance. Pharyngeal swallow initiation is suspected to be delayed but hyolaryngeal elevation/excursion functional via palpation. No overt s/s aspiration with single straw sips of thin (~8oz), 4oz applesauce. Patient not appropriate for solid trials given fluctuating level of alertness and mentation and son in agreement with this. Progression toward goals:  []         Improving appropriately and progressing toward goals  []         Improving slowly and progressing toward goals  []         Not making progress toward goals and plan of care will be adjusted     PLAN:  Recommendations and Planned Interventions:  -- Puree diet/ Thin liquids.  SINGLE straw sips ok. - may need to pull straw away to reduce rate  -- CRUSH all meds in applesauce  -- Strict upright positioning with all PO  -- HOLD po if too lethargic. Only feed if fully alert, upright, actively accepting and requesting    Patient continues to benefit from skilled intervention to address the above impairments. Continue treatment per established plan of care. Discharge Recommendations:  Skilled Nursing Facility     SUBJECTIVE:   Patient stated mmm is that applesauce? It is so good. OBJECTIVE:   Cognitive and Communication Status:  Neurologic State: Alert, Confused  Orientation Level: Oriented to person  Cognition: Follows commands  Perception: Appears intact  Perseveration: No perseveration noted  Safety/Judgement: Decreased awareness of environment, Decreased awareness of need for assistance, Decreased awareness of need for safety, Decreased insight into deficits  Dysphagia Treatment:  Oral Assessment:  Oral Assessment  Labial: No impairment  Lingual: No impairment  Velum: Unable to visualize  Mandible: No impairment  P.O. Trials:  Patient Position:  (upright in bed)  Vocal quality prior to P.O.: No impairment  Consistency Presented:  Thin liquid;Puree  How Presented: Successive swallows;Straw;SLP-fed/presented;Spoon     Bolus Acceptance: No impairment  Bolus Formation/Control: Impaired  Type of Impairment: Delayed  Propulsion: Delayed (# of seconds)  Oral Residue: None  Initiation of Swallow: Delayed (# of seconds)  Laryngeal Elevation: Functional  Aspiration Signs/Symptoms: None  Pharyngeal Phase Characteristics: No impairment, issues, or problems   Effective Modifications: None  Cues for Modifications: None       Oral Phase Severity: Mild-moderate  Pharyngeal Phase Severity : Mild                 Pain:  Pain Scale 1: Numeric (0 - 10)  Pain Intensity 1: 0     After treatment:   []              Patient left in no apparent distress sitting up in chair  [x]              Patient left in no apparent distress in bed  [x]              Call bell left within reach  []              Nursing notified  []              Caregiver present  []              Bed alarm activated    COMMUNICATION/EDUCATION:     The patients plan of care including recommendations, planned interventions, and recommended diet changes were discussed with: Registered Nurse.     Lois Marquis, SLP  Time Calculation: 24 mins

## 2017-12-01 NOTE — PROGRESS NOTES
Problem: Dysphagia (Adult)  Goal: *Acute Goals and Plan of Care (Insert Text)  Speech Goals   Initiated 11/28/2017  1. Patient will tolerate puree/thin liquid diet with no s/s of aspiration within 7 days discontinued 11/29/2017   2. Patient will participate in solid trials with timely and complete mastication and full oral clearance within 7 days discontinued 11/29/2017   3. Patient will participate in re-assessment of swallow function within 7 days   Speech language pathology dysphagia treatment  Patient: Mickey Arce (26 y.o. female)  Date: 12/1/2017  Diagnosis: CVA (cerebral vascular accident) [facility]) Hemispheric carotid artery syndrome       Precautions: swallow      ASSESSMENT:  Patient remains significantly confused which limits safe PO intake. Patient with appropriate bolus acceptance, however then with significantly prolonged manipulation of bolus. Patient with delayed posterior propulsion requiring verbal cues to swallow bolus. No overt s/s aspiration observed. Patient then began yelling, \"Poison! It's poison! \" and attempted to spit bolus out (although nothing in oral cavity as patient had already swallowed). Not yet appropriate for diet initiation secondary to significant confusion. Will continue to follow as mental status improves. Progression toward goals:  []         Improving appropriately and progressing toward goals  [x]         Improving slowly and progressing toward goals  []         Not making progress toward goals and plan of care will be adjusted     PLAN:  Recommendations and Planned Interventions:  --Continue NPO  --Frequent oral care  --SLP to follow for re-evaluation of swallow function  Patient continues to benefit from skilled intervention to address the above impairments. Continue treatment per established plan of care. Discharge Recommendations: To Be Determined     SUBJECTIVE:   Patient stated Poison! It's poison!     OBJECTIVE:   Cognitive and Communication Status:  Neurologic State: Alert, Confused  Orientation Level: Oriented to person  Cognition: Decreased command following, Decreased attention/concentration  Perception: Appears intact  Perseveration: No perseveration noted  Safety/Judgement: Decreased awareness of environment, Decreased awareness of need for assistance, Decreased awareness of need for safety, Decreased insight into deficits  Dysphagia Treatment:     P.O. Trials:  Patient Position: upright in bed  Vocal quality prior to P.O.: No impairment  Consistency Presented: Puree  How Presented: SLP-fed/presented;Spoon     Bolus Acceptance: No impairment  Bolus Formation/Control: Impaired  Type of Impairment: Delayed  Propulsion: Delayed (# of seconds) (required verbal cues)  Oral Residue: None  Initiation of Swallow: Delayed (# of seconds)  Laryngeal Elevation: Functional  Aspiration Signs/Symptoms: None  Pharyngeal Phase Characteristics: No impairment, issues, or problems   Effective Modifications: None  Cues for Modifications: None                    Pain:  Pain Scale 1: Numeric (0 - 10)  Pain Intensity 1: 0     After treatment:   []              Patient left in no apparent distress sitting up in chair  [x]              Patient left in no apparent distress in bed  [x]              Call bell left within reach  [x]              Nursing notified  []              Caregiver present  []              Bed alarm activated    COMMUNICATION/EDUCATION:   The patients plan of care including recommendations, planned interventions, and recommended diet changes were discussed with: Registered NurseHadley Martin SLP  Time Calculation: 10 mins

## 2017-12-01 NOTE — PROGRESS NOTES
Pt to be transferred back to the [facility] in [facility] when ready for d/c.  Monique Segoviar

## 2017-12-01 NOTE — CONSULTS
Urology Consult    Subjective:     Date of Consultation:  December 1, 2017    Referring Physician: Cynthia Quintana    Reason for Consultation:  retention    History of Present Illness:   Patient is a 80 y.o.  female who is being seen for retention. She was admitted to the hospital for CVA (cerebral vascular accident) [facility]). 79 yo woman with psychiatric problems currently admitted for suspected uti as well. History of uti. Currently on lithium. Nurses tried multiple times to place a catheter with no success. Patient is a poor historian. Catheter that was inplace was removed due to no urine output. Past Medical History:   Diagnosis Date    CAD (coronary artery disease)     COPD (chronic obstructive pulmonary disease) ([address]     Hypercholesteremia     Hypertension     Ill-defined condition     mass on ovaries    Psychiatric disorder     bipolar      No past surgical history on file. No family history on file. Social History   Substance Use Topics    Smoking status: Former Smoker    Smokeless tobacco: Not on file    Alcohol use No     Allergies   Allergen Reactions    Enablex [Darifenacin] Rash      Prior to Admission medications    Medication Sig Start Date End Date Taking? Authorizing Provider   cephALEXin (KEFLEX) 500 mg capsule Take 500 mg by mouth two (2) times a day. UTI, due to end 11/23/17   Yes Historical Provider   fluticasone-vilanterol (BREO ELLIPTA) 100-25 mcg/dose inhaler Take 1 Puff by inhalation daily. Yes Historical Provider   polyethylene glycol (MIRALAX) 17 gram packet Take 17 g by mouth daily. Yes Historical Provider   tiotropium (SPIRIVA WITH HANDIHALER) 18 mcg inhalation capsule Take 1 Cap by inhalation daily. Yes Historical Provider   sorbitol 70 % solution Take 30 mL by mouth every eight (8) hours as needed for Other (constipation).    Yes Historical Provider   lidocaine (ASPERCREME, LIDOCAINE,) 4 % topical cream Apply  to affected area two (2) times daily as needed for Pain. Yes Historical Provider   hydrocortisone (PROCTOSOL HC) 2.5 % rectal cream Insert  into rectum as needed for Hemorrhoids. PRN after bowel movements (hemorrhoids)   Yes Historical Provider   metFORMIN (GLUCOPHAGE) 1,000 mg tablet Take 1,000 mg by mouth two (2) times daily (with meals). Yes Historical Provider   clotrimazole-betamethasone (LOTRISONE) topical cream APPLY TOPICALLY TWICE DAILY 10/23/17  Yes Bernardino Bertrand NP   furosemide (LASIX) 20 mg tablet Take 40 mg by mouth daily. Yes Historical Provider   acetaminophen (TYLENOL) 325 mg tablet Take 650 mg by mouth every six (6) hours as needed for Pain. Yes Historical Provider   LHadley acidoph & paracasei- S therm- Bifido (COBY-Q) 8 billion cell cap cap Take 1 Cap by mouth daily. 3/2/16  Yes Go Elizabeth MD   mirtazapine (REMERON) 7.5 mg tablet Take 1 Tab by mouth nightly. 3/2/16  Yes Go Elizabeth MD   lithium carbonate 300 mg tablet Take 1 Tab by mouth daily. In the morning 3/2/16  Yes Go Elizabeth MD   levothyroxine (SYNTHROID) 50 mcg tablet Take 50 mcg by mouth Daily (before breakfast). Yes Ethan Quinonez MD   omeprazole (PRILOSEC) 20 mg capsule Take 20 mg by mouth daily. Yes Ethan Quinonez MD   cholecalciferol (VITAMIN D3) 1,000 unit cap Take 1,000 Units by mouth daily. Yes Ethan Quinonez MD   albuterol-ipratropium (DUO-NEB) 2.5 mg-0.5 mg/3 ml nebulizer solution 3 mL by Nebulization route three (3) times daily. Yes tEhan Quinonez MD   propranolol (INDERAL) 10 mg tablet Take 10 mg by mouth daily. Yes Historical Provider   atorvastatin (LIPITOR) 40 mg tablet Take 20 mg by mouth nightly. Yes Historical Provider   nystatin (MYCOSTATIN) powder Apply  to affected area two (2) times a day. Historical Provider   guaiFENesin (MUCINEX) 1,200 mg Ta12 ER tablet Take 1,200 mg by mouth two (2) times a day.     Ethan Quinonez MD         Review of Systems:  A comprehensive review of systems was negative except for that written in the HPI.    Objective:     Patient Vitals for the past 8 hrs:   BP Temp Pulse Resp SpO2   17 1124 144/61 98.4 °F (36.9 °C) 74 18 98 %   17 0855 - - - - 96 %     Temp (24hrs), Av.3 °F (36.8 °C), Min:97.7 °F (36.5 °C), Max:98.8 °F (37.1 °C)      Intake and Output:    1901 -  0700  In: 473 [I.V.:870]  Out: 2900 [Urine:2900]    Physical Exam:            General:    combative                     Skin:  Normal.                HEENT:  Normal        Throat/Neck:  normal   Lymph nodes:                   Lungs:  normal effort      Cardiovascular:  no edema             Abdomen[de-identified]  soft, non-tender. Bowel sounds normal. No masses,  no organomegaly             Genitalia:  16 fr catheter placed with return of clear yellow urine. Extremities:  lower ext boot       Assessment:     Principal Problem:    Hemispheric carotid artery syndrome (2017)          Patient is a 80 y.o.  female who is being seen for retention. She was admitted to the hospital for CVA (cerebral vascular accident) [facility]). 81 yo woman with psychiatric problems currently admitted for suspected uti as well. History of uti. Currently on lithium. Nurses tried multiple times to place a catheter with no success. Patient is a poor historian. Catheter that was inplace was removed due to no urine output. Plan:     - Continue catheter as long as it is needed.       Signed By: Kris Cardoza MD                         2017

## 2017-12-01 NOTE — PROGRESS NOTES
Psychiatry asked to see re lithium treatment. Pt with long hx of bipolar disorder has been stable psychiatrically with mirtazapine and lithium. Does take furosemide which can raise lithium levels. Lithium level not checked on admission. Patient admitted for slurred speech and confusion. Acute brain pathology did not occur per imaging. Pt has continued to be lethargic, difficulty breathing with hypercapnia and oxygen at 72%. Concerns include being treated for infection, lack of mobility without generalized weakness. No reports of manic symptoms. On exam pt was drowsy, cooperative though mildly confused. Memory, recall, impaired. Could not develop thoughts to have a conversation. Did try to answer questions appropriately but often drifted off. No evidence of psychotic sx. Cognitive function impaired in all spheres including orientation. She did remember my name after several practice runs but doubt she would later. DX: Altered mental status due to physical ailments. Bipolar disorder under control. As she is now having difficulty with alertness would not start lithium until alert and returned to baseline. Will need to monitor lithium closely if she continues on diuretics.

## 2017-12-01 NOTE — WOUND CARE
Wound Care Note:     New consult placed by nurse request for skin breakdown to labia    Chart shows:  Admitted for hemispheric carotid artery syndrome   Past Medical History:   Diagnosis Date    CAD (coronary artery disease)     COPD (ch[facility] obstructive pulmonary disease) [address] [address]     Hypercholesteremia     Hypertension     Ill-defined condition     mass on ovaries    Psychiatric disorder     bipolar     WBC = 10.8 on 11/30/17    Assessment:   Patient is alert, communicative, incontinent with maximum assistance needed in repositioning. Bed: Total Care Sport  Patient will have king place- difficulty in placing king  Diet: NPO  Patient reports no pain    Bilateral heels, buttocks, and sacral skin intact and with blanchable erythema. 1. Patient's labia and groin are denuded, it is very tender to touch. Although no satellite lesions are present, believe this is yeast related. Spoke with Dr. Carla Rider; wound care orders obtained. Patient repositioned on left side Heels offloaded in Prevalon boots     Recommendations:    Labia and groin- Every 12 hours gently cleanse away any soiled cream, wipe wound bed clean and dry, apply a thin coat of Secura Extra Thick Antifungal cream.  Reapply PRN. Skin Care & Pressure Prevention:  Minimize layers of linen/pads under patient to optimize support surface. Turn/reposition approximately every 2 hours and offload heels.   Manage incontinence / promote continence     Discussed above plan with patient & SHREE Figueroa    Transition of Care: Plan to follow as needed while admitted to hospital.    Alejandra Patel RN, BSN, Wound Care Nurse  office [phone]  pager [phone] or call  to page

## 2017-12-01 NOTE — PROGRESS NOTES
Pulmonary, Critical Care, and Sleep Medicine~Progress Note    Name: Jose Quezada MRN: [id number]   : 1930 Hospital: . [address]   Date: 2017 9:53 AM Admission: 2017     IMPRESSION:   · Acute reps failure secondary to LLL CAP, likely OHS/GLORIA and COPD  · Bipolar, on lithium  · CAD  · RA on MTX  · COPD, unknown FEV1. Not bronchospastic       PLAN:   · Lovenox/protonix   · On zosyn   · Her mildly hypercapnic state is not contributing to her agitation. · Duonebs, add Pulmicort   · Bipap at night  · Does not qualify for trilogy  · O2 titration above 90%  · Bronchial hygiene   · PRN over the weekend      Daily Progression:    In no resp distress     I have reviewed the labs and previous days notes. Review of systems not obtained due to patient factors.     OBJECTIVE:     Vital Signs:       Visit Vitals    /48 (BP 1 Location: Left arm, BP Patient Position: At rest)    Pulse 68    Temp 97.7 °F (36.5 °C)    Resp 18    Ht 5' 5\" (1.651 m)    Wt 83.4 kg (183 lb 13.8 oz)  Comment: 1 pillow and foam boots    SpO2 96%    BMI 30.6 kg/m2      Temp (24hrs), Av.2 °F (36.8 °C), Min:97.7 °F (36.5 °C), Max:98.8 °F (37.1 °C)     Intake/Output:     Last shift:      Last 3 shifts:  1901 -  0700  In: 426 [I.V.:870]  Out: 2900 [Urine:2900]          Intake/Output Summary (Last 24 hours) at 17 1055  Last data filed at 17 0700   Gross per 24 hour   Intake              870 ml   Output             2700 ml   Net            -1830 ml       Physical Exam:                                        Exam Findings Other   General: No resp distress noted, appears stated age    HEENT:  No ulcers, JVD not elevated, no cervical LAD    Chest: No pectus deformity, normal chest rise b/l    HEART:  RRR, no murmurs/rubs/gallops    Lungs:  CTA b/l, no rhonchi/crackles/wheeze, diminished BS at bases    ABD: Soft/NT, non rigid mildly distended    EXT: No cyanosis/clubbing/edema, normal peripheral pulses    Skin: No rashes or ulcers, no mottling    Neuro: Alert         Medications:  Current Facility-Administered Medications   Medication Dose Route Frequency    budesonide (PULMICORT) 500 mcg/2 ml nebulizer suspension  500 mcg Nebulization BID RT    dextrose 5 % - 0.45% NaCl infusion  75 mL/hr IntraVENous CONTINUOUS    levothyroxine (SYNTHROID) tablet 50 mcg  50 mcg Oral 6am    albuterol-ipratropium (DUO-NEB) 2.5 MG-0.5 MG/3 ML  3 mL Nebulization Q6H RT    piperacillin-tazobactam (ZOSYN) 10.125 g in  mL infusion  10.125 g IntraVENous Q24H    enoxaparin (LOVENOX) injection 40 mg  40 mg SubCUTAneous Q24H    balsam peru-castor oil (VENELEX)  mg/gram ointment   Topical BID    aspirin chewable tablet 81 mg  81 mg Oral DAILY    polyethylene glycol (MIRALAX) packet 17 g  17 g Oral DAILY PRN    acetaminophen (TYLENOL) tablet 650 mg  650 mg Oral Q6H PRN    albuterol-ipratropium (DUO-NEB) 2.5 MG-0.5 MG/3 ML  3 mL Nebulization Q6H PRN    atorvastatin (LIPITOR) tablet 20 mg  20 mg Oral QHS    guaiFENesin ER (MUCINEX) tablet 1,200 mg  1,200 mg Oral BID    lactobac ac& pc-s.therm-b.anim (COBY Q/RISAQUAD)  1 Cap Oral DAILY    mirtazapine (REMERON) tablet 7.5 mg  7.5 mg Oral QHS    nystatin (MYCOSTATIN) 100,000 unit/gram powder   Topical BID    pantoprazole (PROTONIX) tablet 40 mg  40 mg Oral DAILY    propranolol (INDERAL) tablet 10 mg  10 mg Oral DAILY    insulin lispro (HUMALOG) injection   SubCUTAneous AC&HS    glucose chewable tablet 16 g  4 Tab Oral PRN    dextrose (D50W) injection syrg 12.5-25 g  12.5-25 g IntraVENous PRN    glucagon (GLUCAGEN) injection 1 mg  1 mg IntraMUSCular PRN       Labs:  ABG Recent Labs      11/30/17   1122  11/28/17   2058  11/28/17   1147   PHI  7.358  7.300*  7.239*   PCO2I  50.0*  48.4*  50.6*   PO2I  72*  137*  105*   HCO3I  28.1*  23.8  21.6*   SO2I  93  99*  97   FIO2I  21  0.40   -- CBC Recent Labs      11/30/17   0435   WBC  10.8   HGB  10.4*   HCT  34.2*   PLT  302   MCV  96.1   MCH  03.4        Metabolic  Panel Recent Labs      11/30/17 0435   NA  150*   K  3.8   CL  116*   CO2  26   GLU  124*   BUN  19   CREA  1.03*   CA  9.8        Pertinent Labs                WILMER Little  12/1/2017

## 2017-12-01 NOTE — PROGRESS NOTES
Problem: Falls - Risk of  Goal: *Absence of Falls  Document Kristina Fall Risk and appropriate interventions in the flowsheet. Outcome: Progressing Towards Goal  Fall Risk Interventions:  Mobility Interventions: PT Consult for mobility concerns    Mentation Interventions: Evaluate medications/consider consulting pharmacy, Reorient patient, Adequate sleep, hydration, pain control    Medication Interventions: Evaluate medications/consider consulting pharmacy    Elimination Interventions: Toileting schedule/hourly rounds, Call light in reach    Bed in low position. Locked bed wheels. Call bell and personal items within reach. Side rails up x3. Hourly rounding performed. 0730 notified Maria Alejandra Adhikari MD in regards to pt bladder scan showing 1069cc. Straight cath only 100cc output. Follow up bladder scan showed 1113cc. recieved orders for king insertion.

## 2017-12-02 LAB
ANION GAP SERPL CALC-SCNC: 6 MMOL/L (ref 5–15)
ANION GAP SERPL CALC-SCNC: 9 MMOL/L (ref 5–15)
BASOPHILS # BLD: 0 K/UL (ref 0–0.1)
BASOPHILS NFR BLD: 0 % (ref 0–1)
BUN SERPL-MCNC: 15 MG/DL (ref 6–20)
BUN SERPL-MCNC: 15 MG/DL (ref 6–20)
BUN/CREAT SERPL: 14 (ref 12–20)
BUN/CREAT SERPL: 15 (ref 12–20)
CALCIUM SERPL-MCNC: 9.2 MG/DL (ref 8.5–10.1)
CALCIUM SERPL-MCNC: 9.6 MG/DL (ref 8.5–10.1)
CHLORIDE SERPL-SCNC: 114 MMOL/L (ref 97–108)
CHLORIDE SERPL-SCNC: 116 MMOL/L (ref 97–108)
CO2 SERPL-SCNC: 26 MMOL/L (ref 21–32)
CO2 SERPL-SCNC: 27 MMOL/L (ref 21–32)
CREAT SERPL-MCNC: 0.98 MG/DL (ref 0.55–1.02)
CREAT SERPL-MCNC: 1.11 MG/DL (ref 0.55–1.02)
EOSINOPHIL # BLD: 0.1 K/UL (ref 0–0.4)
EOSINOPHIL NFR BLD: 2 % (ref 0–7)
ERYTHROCYTE [DISTWIDTH] IN BLOOD BY AUTOMATED COUNT: 14 % (ref 11.5–14.5)
GLUCOSE BLD STRIP.AUTO-MCNC: 118 MG/DL (ref 65–100)
GLUCOSE BLD STRIP.AUTO-MCNC: 138 MG/DL (ref 65–100)
GLUCOSE BLD STRIP.AUTO-MCNC: 158 MG/DL (ref 65–100)
GLUCOSE BLD STRIP.AUTO-MCNC: 180 MG/DL (ref 65–100)
GLUCOSE SERPL-MCNC: 134 MG/DL (ref 65–100)
GLUCOSE SERPL-MCNC: 135 MG/DL (ref 65–100)
HCT VFR BLD AUTO: 38.7 % (ref 35–47)
HGB BLD-MCNC: 11.9 G/DL (ref 11.5–16)
LACTATE SERPL-SCNC: 1.2 MMOL/L (ref 0.4–2)
LACTATE SERPL-SCNC: 2.2 MMOL/L (ref 0.4–2)
LYMPHOCYTES # BLD: 3.8 K/UL (ref 0.8–3.5)
LYMPHOCYTES NFR BLD: 40 % (ref 12–49)
MCH RBC QN AUTO: 29 PG (ref 26–34)
MCHC RBC AUTO-ENTMCNC: 30.7 G/DL (ref 30–36.5)
MCV RBC AUTO: 94.4 FL (ref 80–99)
MONOCYTES # BLD: 0.9 K/UL (ref 0–1)
MONOCYTES NFR BLD: 9 % (ref 5–13)
NEUTS SEG # BLD: 4.7 K/UL (ref 1.8–8)
NEUTS SEG NFR BLD: 49 % (ref 32–75)
PLATELET # BLD AUTO: 382 K/UL (ref 150–400)
POTASSIUM SERPL-SCNC: 2.7 MMOL/L (ref 3.5–5.1)
POTASSIUM SERPL-SCNC: 3.4 MMOL/L (ref 3.5–5.1)
RBC # BLD AUTO: 4.1 M/UL (ref 3.8–5.2)
SERVICE CMNT-IMP: ABNORMAL
SODIUM SERPL-SCNC: 149 MMOL/L (ref 136–145)
SODIUM SERPL-SCNC: 149 MMOL/L (ref 136–145)
WBC # BLD AUTO: 9.6 K/UL (ref 3.6–11)

## 2017-12-02 PROCEDURE — 74011000250 HC RX REV CODE- 250: Performed by: FAMILY MEDICINE

## 2017-12-02 PROCEDURE — 83605 ASSAY OF LACTIC ACID: CPT | Performed by: FAMILY MEDICINE

## 2017-12-02 PROCEDURE — 74011636637 HC RX REV CODE- 636/637: Performed by: FAMILY MEDICINE

## 2017-12-02 PROCEDURE — 74011250637 HC RX REV CODE- 250/637: Performed by: FAMILY MEDICINE

## 2017-12-02 PROCEDURE — 80048 BASIC METABOLIC PNL TOTAL CA: CPT | Performed by: FAMILY MEDICINE

## 2017-12-02 PROCEDURE — 74011250636 HC RX REV CODE- 250/636: Performed by: HOSPITALIST

## 2017-12-02 PROCEDURE — 94660 CPAP INITIATION&MGMT: CPT

## 2017-12-02 PROCEDURE — 74011000250 HC RX REV CODE- 250: Performed by: PHYSICIAN ASSISTANT

## 2017-12-02 PROCEDURE — 82962 GLUCOSE BLOOD TEST: CPT

## 2017-12-02 PROCEDURE — 36415 COLL VENOUS BLD VENIPUNCTURE: CPT | Performed by: FAMILY MEDICINE

## 2017-12-02 PROCEDURE — 74011250637 HC RX REV CODE- 250/637: Performed by: INTERNAL MEDICINE

## 2017-12-02 PROCEDURE — 74011250636 HC RX REV CODE- 250/636: Performed by: INTERNAL MEDICINE

## 2017-12-02 PROCEDURE — 65660000000 HC RM CCU STEPDOWN

## 2017-12-02 PROCEDURE — 85025 COMPLETE CBC W/AUTO DIFF WBC: CPT | Performed by: FAMILY MEDICINE

## 2017-12-02 PROCEDURE — 94640 AIRWAY INHALATION TREATMENT: CPT

## 2017-12-02 PROCEDURE — 74011250637 HC RX REV CODE- 250/637: Performed by: HOSPITALIST

## 2017-12-02 PROCEDURE — 74011250636 HC RX REV CODE- 250/636: Performed by: FAMILY MEDICINE

## 2017-12-02 RX ORDER — LITHIUM CARBONATE 300 MG
300 TABLET ORAL DAILY
Status: DISCONTINUED | OUTPATIENT
Start: 2017-12-03 | End: 2017-12-02

## 2017-12-02 RX ORDER — SODIUM CHLORIDE 9 MG/ML
125 INJECTION, SOLUTION INTRAVENOUS CONTINUOUS
Status: DISCONTINUED | OUTPATIENT
Start: 2017-12-02 | End: 2017-12-05 | Stop reason: HOSPADM

## 2017-12-02 RX ORDER — POTASSIUM CHLORIDE 7.45 MG/ML
10 INJECTION INTRAVENOUS
Status: COMPLETED | OUTPATIENT
Start: 2017-12-02 | End: 2017-12-02

## 2017-12-02 RX ORDER — LITHIUM 8 MEQ/5ML
300 SOLUTION ORAL DAILY
Status: DISCONTINUED | OUTPATIENT
Start: 2017-12-02 | End: 2017-12-05 | Stop reason: HOSPADM

## 2017-12-02 RX ORDER — LITHIUM CARBONATE 300 MG
300 TABLET ORAL DAILY
Status: DISCONTINUED | OUTPATIENT
Start: 2017-12-02 | End: 2017-12-02

## 2017-12-02 RX ADMIN — INSULIN LISPRO 2 UNITS: 100 INJECTION, SOLUTION INTRAVENOUS; SUBCUTANEOUS at 16:55

## 2017-12-02 RX ADMIN — POTASSIUM CHLORIDE 10 MEQ: 10 INJECTION, SOLUTION INTRAVENOUS at 05:00

## 2017-12-02 RX ADMIN — BUDESONIDE 500 MCG: 0.5 INHALANT RESPIRATORY (INHALATION) at 08:09

## 2017-12-02 RX ADMIN — BUDESONIDE 500 MCG: 0.5 INHALANT RESPIRATORY (INHALATION) at 21:07

## 2017-12-02 RX ADMIN — LEVOTHYROXINE SODIUM 50 MCG: 50 TABLET ORAL at 07:55

## 2017-12-02 RX ADMIN — POTASSIUM CHLORIDE 10 MEQ: 10 INJECTION, SOLUTION INTRAVENOUS at 02:37

## 2017-12-02 RX ADMIN — SODIUM CHLORIDE 125 ML/HR: 900 INJECTION, SOLUTION INTRAVENOUS at 03:47

## 2017-12-02 RX ADMIN — LITHIUM 300 MG: 8 SOLUTION ORAL at 13:48

## 2017-12-02 RX ADMIN — ENOXAPARIN SODIUM 40 MG: 40 INJECTION SUBCUTANEOUS at 18:43

## 2017-12-02 RX ADMIN — IPRATROPIUM BROMIDE AND ALBUTEROL SULFATE 3 ML: .5; 3 SOLUTION RESPIRATORY (INHALATION) at 21:07

## 2017-12-02 RX ADMIN — IPRATROPIUM BROMIDE AND ALBUTEROL SULFATE 3 ML: .5; 3 SOLUTION RESPIRATORY (INHALATION) at 08:09

## 2017-12-02 RX ADMIN — SODIUM CHLORIDE 125 ML/HR: 900 INJECTION, SOLUTION INTRAVENOUS at 13:48

## 2017-12-02 RX ADMIN — FLUCONAZOLE 200 MG: 100 TABLET ORAL at 08:34

## 2017-12-02 RX ADMIN — IPRATROPIUM BROMIDE AND ALBUTEROL SULFATE 3 ML: .5; 3 SOLUTION RESPIRATORY (INHALATION) at 15:11

## 2017-12-02 RX ADMIN — PIPERACILLIN AND TAZOBACTAM 10.12 G: 3; .375 INJECTION, POWDER, FOR SOLUTION INTRAVENOUS at 12:56

## 2017-12-02 RX ADMIN — SODIUM CHLORIDE 500 ML: 900 INJECTION, SOLUTION INTRAVENOUS at 02:37

## 2017-12-02 RX ADMIN — MIRTAZAPINE 7.5 MG: 15 TABLET, FILM COATED ORAL at 21:36

## 2017-12-02 RX ADMIN — INSULIN LISPRO 2 UNITS: 100 INJECTION, SOLUTION INTRAVENOUS; SUBCUTANEOUS at 12:00

## 2017-12-02 RX ADMIN — PROPRANOLOL HYDROCHLORIDE 10 MG: 10 TABLET ORAL at 08:34

## 2017-12-02 RX ADMIN — POTASSIUM CHLORIDE 10 MEQ: 10 INJECTION, SOLUTION INTRAVENOUS at 03:47

## 2017-12-02 RX ADMIN — ATORVASTATIN CALCIUM 20 MG: 20 TABLET, FILM COATED ORAL at 21:36

## 2017-12-02 RX ADMIN — Medication 1 CAPSULE: at 08:34

## 2017-12-02 RX ADMIN — ASPIRIN 81 MG 81 MG: 81 TABLET ORAL at 08:34

## 2017-12-02 RX ADMIN — POTASSIUM CHLORIDE 10 MEQ: 10 INJECTION, SOLUTION INTRAVENOUS at 04:55

## 2017-12-02 RX ADMIN — MICONAZOLE NITRATE: 20 CREAM TOPICAL at 21:00

## 2017-12-02 NOTE — PROGRESS NOTES
Hospitalist Progress Note  Marianne Thompson MD  Answering service: [phone] OR [phone] from in house phone      Date of Service:  2017  NAME:  Renetta Braun  :  1930  MRN:  [id number]      Admission Summary:   81 yo woman with obesity, bipolar disorder, COPD, HLD, HTN, and reported left ovarian cyst was BIBEMS to the ED from [facility] on 17 with sudden onset slurred speech and difficulty swallowing. She presented under Code Stroke. Facility reportedly had C diff outbreak, and patient had diarrhea that tested C diff negative. She was admitted to [facility] for dysarthria. Interval history / Subjective:      No complaints; more responsive awake, off BIPAP  She took some meals and ensure per RN  Spoke to daughter at bedside discussed MGMT and plan    Assessment & Plan:     Sepsis likely due to UTI (POA)  - tachycardia, fever, leucocytosis, tachypnea on admission resolved  - On zosyn which will cover both UTI and atelectasis  - added diflucan for 3 days for genial fungal infection    Dysarthria   - resolved PTA  - CT head wo contrast  no acute findings and no change since 2016  - TTE  EF 60-65%, no RWMA  - b/l carotid duplex  unremarkable  - MRI brain  no acute; chronic changes  - started ASA 81mg daily  - regular diet per SLP  - TSH, vitamin D, B12 WNL    Left ovarian cyst (POA) - patient/family aware, no further workup ? Causing re tension of urine ?  Will give a call to ob/ gyn to see if removal of the cyst is an option    Diarrhea  - none since presentation; reportedly negative C diff at [facility]    COPD  - AHRF yesterday with requiring BIPAP  - son reports patient takes nebs q4h while awake   - PRN BIPAP and QHS  - Does not qualify for trilogy per pulmonary will d/w pulm to see if CPAP will be enough     HTN - controlled at this time    Bipolar disorder - controlled on home meds will resume lithium    Rheumatoid arthritis - continue prednisone and methotrexate    DM2 - controlled, metformin on hold; FBS, SSI    Hypothyroidism - TFTs WNL; continue levothyroxine    Urinary retention s/p straight cath still retaining placed king cath will need voiding trial     Obesity, Body mass index is 31.59 kg/(m^2). Code status: full  DVT prophylaxis: SCDs    Care Plan discussed with: Patient/Family and Nurse. Spoke to son Mr Donna Farah ( [phone])  Over cell phone  Disposition: TBD. Came from [employer]. 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  Most recent are:  Visit Vitals    /54 (BP 1 Location: Left arm, BP Patient Position: At rest)    Pulse 73    Temp 98.5 °F (36.9 °C)    Resp 20    Ht 5' 5\" (1.651 m)    Wt 86.1 kg (189 lb 13.1 oz)    SpO2 99%    BMI 31.59 kg/m2       Intake/Output Summary (Last 24 hours) at 12/02/17 1136  Last data filed at 12/02/17 7231   Gross per 24 hour   Intake             1100 ml   Output              200 ml   Net              900 ml      Physical Examination:     Constitutional:  somnolent but arousable   ENT:  oral mucosa moist, oropharynx benign  Neck supple, no masses   Resp:  CTA bilaterally anteriorly   CV:  regular rhythm, normal rate,    GI:  +BS, soft, non distended, non tender, obese     Musculoskeletal:  moves all extremities    Neurologic:  somnolent but arousable, Ox2 to person and place (baseline), follows commands     Skin:  warm, dry  Eyes:  PERRL    Data Review:    Review and/or order of clinical lab test  Review and/or order of tests in the radiology section of CPT  Review and/or order of tests in the medicine section of CPT    Labs: Recent Labs      12/02/17 0042 11/30/17 0435   WBC  9.6  10.8   HGB  11.9  10.4*   HCT  38.7  34.2*   PLT  382  302     Recent Labs      12/02/17   0644  12/02/17 0042 11/30/17 0435   NA  149*  149*  150*   K  3.4*  2.7*  3.8   CL  116*  114*  116*   CO2  27  26  26   BUN  15  15  19   CREA  0.98  1.11*  1.03*   GLU  135*  134*  124*   CA  9.2  9.6  9.8     No results for input(s): SGOT, GPT, ALT, AP, TBIL, TBILI, TP, ALB, GLOB, GGT, AML, LPSE in the last 72 hours. No lab exists for component: AMYP, HLPSE  No results for input(s): INR, PTP, APTT in the last 72 hours. No lab exists for component: INREXT, INREXT   No results for input(s): FE, TIBC, PSAT, FERR in the last 72 hours. No results found for: FOL, RBCF   No results for input(s): PH, PCO2, PO2 in the last 72 hours. No results for input(s): CPK, CKNDX, TROIQ in the last 72 hours.     No lab exists for component: CPKMB  No results found for: CHOL, CHOLX, CHLST, CHOLV, HDL, LDL, LDLC, DLDLP, TGLX, TRIGL, TRIGP, CHHD, CHHDX  Lab Results   Component Value Date/Time    Glucose (POC) 118 12/02/2017 07:41 AM    Glucose (POC) 124 12/01/2017 09:12 PM    Glucose (POC) 173 12/01/2017 04:23 PM    Glucose (POC) 152 12/01/2017 11:56 AM    Glucose (POC) 145 12/01/2017 07:20 AM     Lab Results   Component Value Date/Time    Color YELLOW/STRAW 12/01/2017 11:57 AM    Appearance CLEAR 12/01/2017 11:57 AM    Specific gravity 1.023 12/01/2017 11:57 AM    pH (UA) 6.0 12/01/2017 11:57 AM    Protein 30 12/01/2017 11:57 AM    Glucose NEGATIVE  12/01/2017 11:57 AM    Ketone NEGATIVE  12/01/2017 11:57 AM    Bilirubin NEGATIVE  12/01/2017 11:57 AM    Urobilinogen 0.2 12/01/2017 11:57 AM    Nitrites NEGATIVE  12/01/2017 11:57 AM    Leukocyte Esterase NEGATIVE  12/01/2017 11:57 AM    Epithelial cells FEW 12/01/2017 11:57 AM    Bacteria NEGATIVE  12/01/2017 11:57 AM    WBC 0-4 12/01/2017 11:57 AM    RBC 5-10 12/01/2017 11:57 AM     Medications Reviewed:     Current Facility-Administered Medications   Medication Dose Route Frequency    0.9% sodium chloride infusion  125 mL/hr IntraVENous CONTINUOUS    lithium carbonate tablet 300 mg  300 mg Oral DAILY    fluconazole (DIFLUCAN) tablet 200 mg  200 mg Oral DAILY    miconazole (SECURA) 2 % extra thick cream   Topical Q12H    albuterol-ipratropium (DUO-NEB) 2.5 MG-0.5 MG/3 ML  3 mL Nebulization TID RT    budesonide (PULMICORT) 500 mcg/2 ml nebulizer suspension  500 mcg Nebulization BID RT    levothyroxine (SYNTHROID) tablet 50 mcg  50 mcg Oral 6am    piperacillin-tazobactam (ZOSYN) 10.125 g in  mL infusion  10.125 g IntraVENous Q24H    enoxaparin (LOVENOX) injection 40 mg  40 mg SubCUTAneous Q24H    balsam peru-castor oil (VENELEX)  mg/gram ointment   Topical BID    aspirin chewable tablet 81 mg  81 mg Oral DAILY    polyethylene glycol (MIRALAX) packet 17 g  17 g Oral DAILY PRN    acetaminophen (TYLENOL) tablet 650 mg  650 mg Oral Q6H PRN    albuterol-ipratropium (DUO-NEB) 2.5 MG-0.5 MG/3 ML  3 mL Nebulization Q6H PRN    atorvastatin (LIPITOR) tablet 20 mg  20 mg Oral QHS    guaiFENesin ER (MUCINEX) tablet 1,200 mg  1,200 mg Oral BID    lactobac ac& pc-s.therm-b.anim (COBY Q/RISAQUAD)  1 Cap Oral DAILY    mirtazapine (REMERON) tablet 7.5 mg  7.5 mg Oral QHS    pantoprazole (PROTONIX) tablet 40 mg  40 mg Oral DAILY    propranolol (INDERAL) tablet 10 mg  10 mg Oral DAILY    insulin lispro (HUMALOG) injection   SubCUTAneous AC&HS    glucose chewable tablet 16 g  4 Tab Oral PRN    dextrose (D50W) injection syrg 12.5-25 g  12.5-25 g IntraVENous PRN    glucagon (GLUCAGEN) injection 1 mg  1 mg IntraMUSCular PRN     ______________________________________________________________________  EXPECTED LENGTH OF STAY: 4d 21h  ACTUAL LENGTH OF STAY:          9                 Srinivas Wild MD

## 2017-12-02 NOTE — PROGRESS NOTES
I earlier returned paged from nurse Dean Mendenhall who reported that per her bladder scan, patient had 1400 ml retained urine. I ordered CT abd/pelvis which showed large cystic pelvic mass but an empty bladder with king catheter in place. I returned second page from nurse Héctor Santoyo who reports repeat bladder scan shows ~ 1300 ml retained urine. Based on CT report, I suspect that bladder is empty but retained urine suggested per bladder scan could represent fluid within this large pelvis cystic mass. My concern is that patient may not be making urine. As there are no labs to compare from today, I have ordered stat labs including BMP to check renal function. Nurse notes that urologist was paged earlier.

## 2017-12-02 NOTE — PROGRESS NOTES
Problem: Pressure Injury - Risk of  Goal: *Prevention of pressure ulcer  Outcome: Progressing Towards Goal  Maintain Q2 turning schedule. Keeping linens dry. Moisture barrier to protect from incontinence. Will continue to monitor skin. 1945- Bedside and Verbal shift change report given to Mani Nance (oncoming nurse) by Dean Mendenhall (offgoing nurse). Report included the following information SBAR, Kardex, Intake/Output, MAR, Recent Results and Cardiac Rhythm NSR.

## 2017-12-02 NOTE — PROGRESS NOTES
Problem: Falls - Risk of  Goal: *Absence of Falls  Document Kristina Fall Risk and appropriate interventions in the flowsheet.    Outcome: Progressing Towards Goal  Fall Risk Interventions:  Mobility Interventions: Patient to call before getting OOB, PT Consult for mobility concerns    Mentation Interventions: Adequate sleep, hydration, pain control, Reorient patient, More frequent rounding    Medication Interventions: Bed/chair exit alarm, Evaluate medications/consider consulting pharmacy    Elimination Interventions: Call light in reach, Toileting schedule/hourly rounds

## 2017-12-02 NOTE — PROGRESS NOTES
2300 sana franco md. Pt retaining 1406 of urine. Received orders for a lactic, cbc and bmp.  0150 pagejennifer franco MD in regards to lactic of 2.2 and potassium of 2.7.  Received orders of 500cc bolus of nacl 0.9%, nacl 0.9%  At 125ml/hr, redraw lactic and bmp and 4 runs of K.  0741 lactic 1.2, K+ 3.4 pt urine output 200ml

## 2017-12-03 LAB
GLUCOSE BLD STRIP.AUTO-MCNC: 105 MG/DL (ref 65–100)
GLUCOSE BLD STRIP.AUTO-MCNC: 117 MG/DL (ref 65–100)
GLUCOSE BLD STRIP.AUTO-MCNC: 154 MG/DL (ref 65–100)
GLUCOSE BLD STRIP.AUTO-MCNC: 168 MG/DL (ref 65–100)
SERVICE CMNT-IMP: ABNORMAL

## 2017-12-03 PROCEDURE — 74011250637 HC RX REV CODE- 250/637: Performed by: FAMILY MEDICINE

## 2017-12-03 PROCEDURE — 74011000250 HC RX REV CODE- 250: Performed by: FAMILY MEDICINE

## 2017-12-03 PROCEDURE — 94660 CPAP INITIATION&MGMT: CPT

## 2017-12-03 PROCEDURE — 94640 AIRWAY INHALATION TREATMENT: CPT

## 2017-12-03 PROCEDURE — 65660000000 HC RM CCU STEPDOWN

## 2017-12-03 PROCEDURE — 74011250636 HC RX REV CODE- 250/636: Performed by: INTERNAL MEDICINE

## 2017-12-03 PROCEDURE — 74011250636 HC RX REV CODE- 250/636: Performed by: FAMILY MEDICINE

## 2017-12-03 PROCEDURE — 74011250636 HC RX REV CODE- 250/636: Performed by: HOSPITALIST

## 2017-12-03 PROCEDURE — 74011000250 HC RX REV CODE- 250: Performed by: PHYSICIAN ASSISTANT

## 2017-12-03 PROCEDURE — 74011250637 HC RX REV CODE- 250/637: Performed by: INTERNAL MEDICINE

## 2017-12-03 PROCEDURE — 74011250637 HC RX REV CODE- 250/637: Performed by: HOSPITALIST

## 2017-12-03 PROCEDURE — 82962 GLUCOSE BLOOD TEST: CPT

## 2017-12-03 RX ADMIN — BUDESONIDE 500 MCG: 0.5 INHALANT RESPIRATORY (INHALATION) at 09:13

## 2017-12-03 RX ADMIN — IPRATROPIUM BROMIDE AND ALBUTEROL SULFATE 3 ML: .5; 3 SOLUTION RESPIRATORY (INHALATION) at 19:58

## 2017-12-03 RX ADMIN — IPRATROPIUM BROMIDE AND ALBUTEROL SULFATE 3 ML: .5; 3 SOLUTION RESPIRATORY (INHALATION) at 09:15

## 2017-12-03 RX ADMIN — LEVOTHYROXINE SODIUM 50 MCG: 50 TABLET ORAL at 06:27

## 2017-12-03 RX ADMIN — ASPIRIN 81 MG 81 MG: 81 TABLET ORAL at 08:03

## 2017-12-03 RX ADMIN — MIRTAZAPINE 7.5 MG: 15 TABLET, FILM COATED ORAL at 21:54

## 2017-12-03 RX ADMIN — BUDESONIDE 500 MCG: 0.5 INHALANT RESPIRATORY (INHALATION) at 19:57

## 2017-12-03 RX ADMIN — ENOXAPARIN SODIUM 40 MG: 40 INJECTION SUBCUTANEOUS at 18:41

## 2017-12-03 RX ADMIN — Medication 1 CAPSULE: at 08:02

## 2017-12-03 RX ADMIN — PIPERACILLIN AND TAZOBACTAM 10.12 G: 3; .375 INJECTION, POWDER, FOR SOLUTION INTRAVENOUS at 18:42

## 2017-12-03 RX ADMIN — ATORVASTATIN CALCIUM 20 MG: 20 TABLET, FILM COATED ORAL at 21:54

## 2017-12-03 RX ADMIN — LITHIUM 300 MG: 8 SOLUTION ORAL at 09:44

## 2017-12-03 RX ADMIN — IPRATROPIUM BROMIDE AND ALBUTEROL SULFATE 3 ML: .5; 3 SOLUTION RESPIRATORY (INHALATION) at 15:47

## 2017-12-03 RX ADMIN — FLUCONAZOLE 200 MG: 100 TABLET ORAL at 08:03

## 2017-12-03 RX ADMIN — PROPRANOLOL HYDROCHLORIDE 10 MG: 10 TABLET ORAL at 08:03

## 2017-12-03 RX ADMIN — SODIUM CHLORIDE 125 ML/HR: 900 INJECTION, SOLUTION INTRAVENOUS at 06:27

## 2017-12-03 RX ADMIN — IPRATROPIUM BROMIDE AND ALBUTEROL SULFATE 3 ML: .5; 3 SOLUTION RESPIRATORY (INHALATION) at 09:13

## 2017-12-03 RX ADMIN — MICONAZOLE NITRATE: 20 CREAM TOPICAL at 21:00

## 2017-12-03 NOTE — PROGRESS NOTES
Hospitalist Progress Note  Srinivas Wild MD  Answering service: [phone] OR [phone] from in house phone      Date of Service:  12/3/2017  NAME:  Sandra Colón  :  1930  MRN:  [id number]      Admission Summary:   81 yo woman with obesity, bipolar disorder, COPD, HLD, HTN, and reported left ovarian cyst was BIBEMS to the ED from [facility] on 17 with sudden onset slurred speech and difficulty swallowing. She presented under Code Stroke. Facility reportedly had C diff outbreak, and patient had diarrhea that tested C diff negative. She was admitted to [facility] for dysarthria. Interval history / Subjective:      No complaints; more responsive awake, off BIPAP had CPAP last night did well  She took some meals and ensure per RN  I want to go back today    Assessment & Plan:     Sepsis likely due to UTI (POA)  - tachycardia, fever, leucocytosis, tachypnea on admission resolved  - On zosyn which will cover both UTI and atelectasis  - added diflucan for 3 days for genial fungal infection    Dysarthria   - resolved PTA  - CT head wo contrast  no acute findings and no change since 2016  - TTE  EF 60-65%, no RWMA  - b/l carotid duplex  unremarkable  - MRI brain  no acute; chronic changes  - started ASA 81mg daily  - regular diet per SLP  - TSH, vitamin D, B12 WNL    Left ovarian cyst (POA) - patient/family aware, no further workup ? Causing re tension of urine ?  Will give a call to ob/ gyn to see if removal of the cyst is an option    Diarrhea  - none since presentation; reportedly negative C diff at [facility]    COPD  - AHRF yesterday with requiring BIPAP  - son reports patient takes nebs q4h while awake   - d/w pulm switched to CPAP last night did well   - Does not qualify for trilogy per pulmonary     HTN - controlled at this time    Bipolar disorder - controlled on home meds will resume lithium    Rheumatoid arthritis - continue prednisone and methotrexate    DM2 - controlled, metformin on hold; FBS, SSI    Hypothyroidism - TFTs WNL; continue levothyroxine    Urinary retention s/p straight cath still retaining placed king cath will need voiding trial     Obesity, Body mass index is 31.73 kg/(m^2). Code status: full  DVT prophylaxis: SCDs    Care Plan discussed with: Patient/Family and Nurse. Spoke to son Mr Mookie Munoz ( [phone])  Over cell phone  Disposition: TBD. Came from [employer]. 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  Most recent are:  Visit Vitals    /56 (BP 1 Location: Left arm, BP Patient Position: At rest)    Pulse 74    Temp 98 °F (36.7 °C)    Resp 20    Ht 5' 5\" (1.651 m)    Wt 86.5 kg (190 lb 11.2 oz)    SpO2 98%    BMI 31.73 kg/m2       Intake/Output Summary (Last 24 hours) at 12/03/17 1023  Last data filed at 12/03/17 0407   Gross per 24 hour   Intake                0 ml   Output             1700 ml   Net            -1700 ml      Physical Examination:     Constitutional:  somnolent but arousable   ENT:  oral mucosa moist, oropharynx benign  Neck supple, no masses   Resp:  CTA bilaterally anteriorly   CV:  regular rhythm, normal rate,    GI:  +BS, soft, non distended, non tender, obese     Musculoskeletal:  moves all extremities    Neurologic:  somnolent but arousable, Ox2 to person and place (baseline), follows commands     Skin:  warm, dry  Eyes:  PERRL    Data Review:    Review and/or order of clinical lab test  Review and/or order of tests in the radiology section of CPT  Review and/or order of tests in the medicine section of CPT    Labs:     Recent Labs      12/02/17 0042   WBC  9.6   HGB  11.9   HCT  38.7   PLT  382     Recent Labs      12/02/17   0644  12/02/17   0042   NA  149*  149*   K  3.4*  2.7*   CL  116*  114*   CO2  27  26   BUN  15  15   CREA  0.98  1.11*   GLU  135*  134*   CA  9.2  9.6     No results for input(s): SGOT, GPT, ALT, AP, TBIL, TBILI, TP, ALB, GLOB, GGT, AML, LPSE in the last 72 hours. No lab exists for component: AMYP, HLPSE  No results for input(s): INR, PTP, APTT in the last 72 hours. No lab exists for component: INREXT, INREXT   No results for input(s): FE, TIBC, PSAT, FERR in the last 72 hours. No results found for: FOL, RBCF   No results for input(s): PH, PCO2, PO2 in the last 72 hours. No results for input(s): CPK, CKNDX, TROIQ in the last 72 hours.     No lab exists for component: CPKMB  No results found for: CHOL, CHOLX, CHLST, CHOLV, HDL, LDL, LDLC, DLDLP, TGLX, TRIGL, TRIGP, CHHD, CHHDX  Lab Results   Component Value Date/Time    Glucose (POC) 105 12/03/2017 05:59 AM    Glucose (POC) 138 12/02/2017 09:16 PM    Glucose (POC) 180 12/02/2017 04:30 PM    Glucose (POC) 158 12/02/2017 11:39 AM    Glucose (POC) 118 12/02/2017 07:41 AM     Lab Results   Component Value Date/Time    Color YELLOW/STRAW 12/01/2017 11:57 AM    Appearance CLEAR 12/01/2017 11:57 AM    Specific gravity 1.023 12/01/2017 11:57 AM    pH (UA) 6.0 12/01/2017 11:57 AM    Protein 30 12/01/2017 11:57 AM    Glucose NEGATIVE  12/01/2017 11:57 AM    Ketone NEGATIVE  12/01/2017 11:57 AM    Bilirubin NEGATIVE  12/01/2017 11:57 AM    Urobilinogen 0.2 12/01/2017 11:57 AM    Nitrites NEGATIVE  12/01/2017 11:57 AM    Leukocyte Esterase NEGATIVE  12/01/2017 11:57 AM    Epithelial cells FEW 12/01/2017 11:57 AM    Bacteria NEGATIVE  12/01/2017 11:57 AM    WBC 0-4 12/01/2017 11:57 AM    RBC 5-10 12/01/2017 11:57 AM     Medications Reviewed:     Current Facility-Administered Medications   Medication Dose Route Frequency    0.9% sodium chloride infusion  125 mL/hr IntraVENous CONTINUOUS  lithium citrate 8 mEq/5 mL (5 mL) oral solution 300 mg  300 mg Oral DAILY    fluconazole (DIFLUCAN) tablet 200 mg  200 mg Oral DAILY    miconazole (SECURA) 2 % extra thick cream   Topical Q12H    albuterol-ipratropium (DUO-NEB) 2.5 MG-0.5 MG/3 ML  3 mL Nebulization TID RT    budesonide (PULMICORT) 500 mcg/2 ml nebulizer suspension  500 mcg Nebulization BID RT    levothyroxine (SYNTHROID) tablet 50 mcg  50 mcg Oral 6am    piperacillin-tazobactam (ZOSYN) 10.125 g in  mL infusion  10.125 g IntraVENous Q24H    enoxaparin (LOVENOX) injection 40 mg  40 mg SubCUTAneous Q24H    balsam peru-castor oil (VENELEX)  mg/gram ointment   Topical BID    aspirin chewable tablet 81 mg  81 mg Oral DAILY    polyethylene glycol (MIRALAX) packet 17 g  17 g Oral DAILY PRN    acetaminophen (TYLENOL) tablet 650 mg  650 mg Oral Q6H PRN    albuterol-ipratropium (DUO-NEB) 2.5 MG-0.5 MG/3 ML  3 mL Nebulization Q6H PRN    atorvastatin (LIPITOR) tablet 20 mg  20 mg Oral QHS    guaiFENesin ER (MUCINEX) tablet 1,200 mg  1,200 mg Oral BID    lactobac ac& pc-s.therm-b.anim (COBY Q/RISAQUAD)  1 Cap Oral DAILY    mirtazapine (REMERON) tablet 7.5 mg  7.5 mg Oral QHS    pantoprazole (PROTONIX) tablet 40 mg  40 mg Oral DAILY    propranolol (INDERAL) tablet 10 mg  10 mg Oral DAILY    insulin lispro (HUMALOG) injection   SubCUTAneous AC&HS    glucose chewable tablet 16 g  4 Tab Oral PRN    dextrose (D50W) injection syrg 12.5-25 g  12.5-25 g IntraVENous PRN    glucagon (GLUCAGEN) injection 1 mg  1 mg IntraMUSCular PRN     ______________________________________________________________________  EXPECTED LENGTH OF STAY: 4d 21h  ACTUAL LENGTH OF STAY:          10                 Damián Suh MD

## 2017-12-03 NOTE — PROGRESS NOTES
Problem: Falls - Risk of  Goal: *Absence of Falls  Document Kristina Fall Risk and appropriate interventions in the flowsheet. Outcome: Progressing Towards Goal  Fall Risk Interventions:  Mobility Interventions: Patient to call before getting OOB, Communicate number of staff needed for ambulation/transfer    Mentation Interventions: Adequate sleep, hydration, pain control, Door open when patient unattended, More frequent rounding, Reorient patient    Medication Interventions: Evaluate medications/consider consulting pharmacy    Elimination Interventions: Call light in reach, Patient to call for help with toileting needs, Toileting schedule/hourly rounds       Bed in low position. Locked bed wheels. Call bell and personal items within reach. Side rails up x3. Hourly rounding performed. Problem: Pressure Injury - Risk of  Goal: *Prevention of pressure ulcer  Outcome: Progressing Towards Goal  Turning Q2 hrs. Skin is dry, clean and intact. Creams applied for excortiation.  Will continue to monitor

## 2017-12-04 PROBLEM — G45.1 HEMISPHERIC CAROTID ARTERY SYNDROME: Status: RESOLVED | Noted: 2017-11-23 | Resolved: 2017-12-04

## 2017-12-04 LAB
GLUCOSE BLD STRIP.AUTO-MCNC: 115 MG/DL (ref 65–100)
GLUCOSE BLD STRIP.AUTO-MCNC: 119 MG/DL (ref 65–100)
GLUCOSE BLD STRIP.AUTO-MCNC: 120 MG/DL (ref 65–100)
GLUCOSE BLD STRIP.AUTO-MCNC: 129 MG/DL (ref 65–100)
SERVICE CMNT-IMP: ABNORMAL

## 2017-12-04 PROCEDURE — 74011250636 HC RX REV CODE- 250/636: Performed by: HOSPITALIST

## 2017-12-04 PROCEDURE — 74011000250 HC RX REV CODE- 250: Performed by: FAMILY MEDICINE

## 2017-12-04 PROCEDURE — 74011250637 HC RX REV CODE- 250/637: Performed by: INTERNAL MEDICINE

## 2017-12-04 PROCEDURE — 94640 AIRWAY INHALATION TREATMENT: CPT

## 2017-12-04 PROCEDURE — 82962 GLUCOSE BLOOD TEST: CPT

## 2017-12-04 PROCEDURE — 77010033678 HC OXYGEN DAILY

## 2017-12-04 PROCEDURE — 94660 CPAP INITIATION&MGMT: CPT

## 2017-12-04 PROCEDURE — 94664 DEMO&/EVAL PT USE INHALER: CPT

## 2017-12-04 PROCEDURE — 74011250637 HC RX REV CODE- 250/637: Performed by: FAMILY MEDICINE

## 2017-12-04 PROCEDURE — 74011250637 HC RX REV CODE- 250/637: Performed by: HOSPITALIST

## 2017-12-04 PROCEDURE — 74011000250 HC RX REV CODE- 250: Performed by: PHYSICIAN ASSISTANT

## 2017-12-04 PROCEDURE — 65270000029 HC RM PRIVATE

## 2017-12-04 PROCEDURE — 74011250636 HC RX REV CODE- 250/636: Performed by: INTERNAL MEDICINE

## 2017-12-04 RX ORDER — GUAIFENESIN 100 MG/5ML
81 LIQUID (ML) ORAL DAILY
Qty: 30 TAB | Refills: 0 | Status: SHIPPED | OUTPATIENT
Start: 2017-12-04 | End: 2018-01-03

## 2017-12-04 RX ADMIN — MIRTAZAPINE 7.5 MG: 15 TABLET, FILM COATED ORAL at 21:37

## 2017-12-04 RX ADMIN — FLUCONAZOLE 200 MG: 100 TABLET ORAL at 09:14

## 2017-12-04 RX ADMIN — MICONAZOLE NITRATE: 20 CREAM TOPICAL at 21:38

## 2017-12-04 RX ADMIN — LITHIUM 300 MG: 8 SOLUTION ORAL at 11:15

## 2017-12-04 RX ADMIN — CASTOR OIL AND BALSAM, PERU: 788; 87 OINTMENT TOPICAL at 09:15

## 2017-12-04 RX ADMIN — LEVOTHYROXINE SODIUM 50 MCG: 50 TABLET ORAL at 07:16

## 2017-12-04 RX ADMIN — MICONAZOLE NITRATE: 20 CREAM TOPICAL at 09:14

## 2017-12-04 RX ADMIN — BUDESONIDE 500 MCG: 0.5 INHALANT RESPIRATORY (INHALATION) at 20:18

## 2017-12-04 RX ADMIN — ASPIRIN 81 MG 81 MG: 81 TABLET ORAL at 09:14

## 2017-12-04 RX ADMIN — Medication 1 CAPSULE: at 09:14

## 2017-12-04 RX ADMIN — ENOXAPARIN SODIUM 40 MG: 40 INJECTION SUBCUTANEOUS at 18:19

## 2017-12-04 RX ADMIN — ACETAMINOPHEN 650 MG: 325 TABLET, FILM COATED ORAL at 17:46

## 2017-12-04 RX ADMIN — GUAIFENESIN 1200 MG: 600 TABLET, EXTENDED RELEASE ORAL at 09:14

## 2017-12-04 RX ADMIN — ATORVASTATIN CALCIUM 20 MG: 20 TABLET, FILM COATED ORAL at 21:36

## 2017-12-04 RX ADMIN — BUDESONIDE 500 MCG: 0.5 INHALANT RESPIRATORY (INHALATION) at 08:12

## 2017-12-04 RX ADMIN — PIPERACILLIN AND TAZOBACTAM 10.12 G: 3; .375 INJECTION, POWDER, FOR SOLUTION INTRAVENOUS at 18:19

## 2017-12-04 RX ADMIN — IPRATROPIUM BROMIDE AND ALBUTEROL SULFATE 3 ML: .5; 3 SOLUTION RESPIRATORY (INHALATION) at 08:12

## 2017-12-04 RX ADMIN — PANTOPRAZOLE SODIUM 40 MG: 40 TABLET, DELAYED RELEASE ORAL at 09:14

## 2017-12-04 RX ADMIN — PROPRANOLOL HYDROCHLORIDE 10 MG: 10 TABLET ORAL at 09:14

## 2017-12-04 RX ADMIN — IPRATROPIUM BROMIDE AND ALBUTEROL SULFATE 3 ML: .5; 3 SOLUTION RESPIRATORY (INHALATION) at 20:18

## 2017-12-04 NOTE — ROUTINE PROCESS
Bedside shift change report given to Anne-Marie Mcbride RN (oncoming nurse) by Munir Nagy RN (offgoing nurse). Report included the following information SBAR, Kardex, Intake/Output, MAR, Recent Results and Cardiac Rhythm NSR.

## 2017-12-04 NOTE — WOUND CARE
WOCN Note:      Follow up for assess of right heel and labial rash  Chart reviewed  Admit dx:  Hemispheric carotid artery syndrome  PMH:  bipolar disorder on lithium,  hematuria, arthritis, COPD, hypertension, cataract surgery     Assessment:   Patient is Alert, communicative, and requires assist of 2 with repositioning. On turn team.  Bed: sport. Patient reports no pain.     Bilateral buttocks and sacral skin intact and with blanching erythema. Right heel, blanching pink erythema   Labia and groin intact without erythema; currently using secura extra thick antifungal cream.     Patient repositioned on right side with pillow between the knees. Heels offloaded on pillows.      Recommendations:    1. Venelex to heels twice daily  2. Prevalon boots bilaterally  3. Continue turn team  4. Continue Sport bed  5. Labia and groin:  Continue Secura Antifungal cream.     Skin Care & Pressure Prevention:  Minimize layers of linen/pads under patient to optimize support surface. Turn/reposition approximately every 2 hours and offload heels. Manage incontinence / promote continence; Aloe Vesta to sacrum and buttocks. Specialty bed: Sport.    Use only flat sheet and one incontinence pad.     Discussed above plan with patient and Krystal SWANN     Transition of Care: Plan to follow weekly and as needed while admitted to hospital.     LEE Sutton RN  Office [phone]  Pager [phone]

## 2017-12-04 NOTE — DISCHARGE INSTRUCTIONS
Discharge SNF/Rehab Instructions/LTAC       PATIENT ID: Nell Bertrand  MRN: [id number]   YOB: 1930    DATE OF ADMISSION: 11/23/2017 12:19 PM    DATE OF DISCHARGE: 12/4/2017    PRIMARY CARE PROVIDER: Lester Willams MD       ATTENDING PHYSICIAN: Sherren Flasher, MD  DISCHARGING PROVIDER: Sherren Flasher, MD     To contact this individual call [phone] and ask the  to page. If unavailable ask to be transferred the Adult Hospitalist Department. CONSULTATIONS: IP CONSULT TO HOSPITALIST  IP CONSULT TO NEUROLOGY  IP CONSULT TO PSYCHIATRY  IP CONSULT TO UROLOGY    PROCEDURES/SURGERIES: * No surgery found *    ADMITTING [address] [place] [address] COURSE:     81 yo woman with obesity, bipolar disorder, COPD, HLD, HTN, and reported left ovarian cyst was BIBEMS to the ED from [address] [place] [address] on 11/23/17 with sudden onset slurred speech and difficulty swallowing. She presented under Code Stroke. Facility reportedly had C diff outbreak, and patient had diarrhea that tested C diff negative.  She was admitted to [facility] for dysarthria.      Assessment & Plan:      Sepsis likely due to UTI (POA)  - tachycardia, fever, leucocytosis, tachypnea on admission resolved  - On zosyn which will cover both UTI and atelectasis- completed  - added diflucan for 3 days for genial fungal infection- completed     Dysarthria   - resolved PTA  - CT head wo contrast 11/23 no acute findings and no change since 4/7/2016  - TTE 11/23 EF 60-65%, no RWMA  - b/l carotid duplex 11/24 unremarkable  - MRI brain 11/25 no acute; chronic changes  - started ASA 81mg daily  - regular diet per SLP  - TSH, vitamin D, B12 WNL     Left ovarian cyst (POA) - patient/family aware, no further workup as last time OB suggested conservative MGMT also pt may not be able to tolerate ant surgery king inserted due to re tension and need voiding trial      Diarrhea  - none since presentation; reportedly negative C diff at [facility]     COPD  - pt will need CPAP at 8 setting during sleep   - Does not qualify for trilogy per pulmonary will d/w pulm to see if CPAP will be enough      HTN - controlled at this time     Bipolar disorder - controlled on home meds will resume lithium     Rheumatoid arthritis - continue prednisone and methotrexate     DM2 - controlled, metformin on hold; FBS, SSI     Hypothyroidism - TFTs WNL; continue levothyroxine     Urinary retention s/p straight cath still retaining placed king cath will need voiding trial      Obesity, Body mass index is 31.59 kg/(m^2). PENDING TEST RESULTS:   At the time of discharge the following test results are still pending:     FOLLOW UP APPOINTMENTS:    Follow-up Information     Follow up With Details Comments Contact Info    [facility]  continued care [address] [address]    Vicente Corbin MD   [address] [place] [address]      Pipo Engel MD In 4 weeks Voiding trial [address] [facility]  [phone]             ADDITIONAL CARE RECOMMENDATIONS:     DIET: Regular Diet /     TUBE FEEDING INSTRUCTIONS:     OXYGEN / BiPAP SETTINGS:  CPAP 8 cm H2O    ACTIVITY: Activity as tolerated    WOUND CARE:     EQUIPMENT needed: cpap      DISCHARGE MEDICATIONS:   See Medication Reconciliation Form      NOTIFY YOUR PHYSICIAN FOR ANY OF THE FOLLOWING:   Fever over 101 degrees for 24 hours. Chest pain, shortness of breath, fever, chills, nausea, vomiting, diarrhea, change in mentation, falling, weakness, bleeding. Severe pain or pain not relieved by medications. Or, any other signs or symptoms that you may have questions about.     DISPOSITION:    Home With:   OT  PT  HH  RN       SNF/Inpatient Rehab/LTAC   x Independent/assisted living    Hospice    Other:       PATIENT CONDITION AT DISCHARGE:     Functional status   x Poor     Deconditioned     Independent      Cognition     Lucid     Forgetful Dementia      Catheters/lines (plus indication)   x William due to retension    PICC     PEG     None      Code status   x  Full code     DNR      PHYSICAL EXAMINATION AT DISCHARGE:     Constitutional:  alert and awake    ENT:  oral mucosa moist, oropharynx benign   Resp:  CTA bilaterally anteriorly   CV:  regular rhythm, normal rate,    GI:  +BS, soft, non distended, non tender, obese     Musculoskeletal:  moves all extremities    Neurologic:   AAOx2 to person and place (baseline), follows commands              CHRONIC MEDICAL DIAGNOSES:  Problem List as of 12/4/2017  Date Reviewed: 12/4/2017          Codes Class Noted - Resolved    Abdominal distension ICD-10-CM: R14.0  ICD-9-CM: 787.3  2/27/2016 - Present        Pelvic mass in female ICD-10-CM: R19.00  ICD-9-CM: 789.30  2/24/2016 - Present        Confusion ICD-10-CM: R41.0  ICD-9-CM: 298.9  2/21/2016 - Present        Bipolar affective disorder, manic, severe ([facility] ICD-10-CM: F31.13  ICD-9-CM: 296.43 Acute 12/12/2012 - Present        * (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

## 2017-12-04 NOTE — PROGRESS NOTES
Spiritual Care Partner Volunteer visited patient in Rm 403 on 12/4/17.   Documented by:  Chaplain Villanueva MDiv, MS, [address] [place] [address] PRA ([id number])

## 2017-12-04 NOTE — PROGRESS NOTES
SBAR provided to patient's nurse - Mara Perales at the [facility]. Faxed clinical updates to [phone] and discussed C-PAP at night during this interaction. C-PAP Settings provided in clinical update and information included in d/c summary. Awaiting call back to confirm discharge plan. Goal is to assist with seamless transition today - 12/4/17. Jose Manuel Maurer RN    Update:  12/4/17 1:00 - Received call back from patient's nurse, Mara Perales who has reviewed clinical updates and requests the following:    - CPAP ordered prior to patient's re-admission to facility  - Pureed vs. Regular diet?  - Crushing medications? - Catheter and it's estimated duration?  - PO antibiotic and estimated stop date? -  Tylenol PRN - patient has been on scheduled, routine for arthritis    Will collaborate with IDT to determine above items requested and eligibility for patient to admit to a skilled nursing facility as the [facility] reports they are not a skilled unit. Therapy can be provided under Medicare Part B at the [facility]. Inquired if physician at the [facility] would be able to address order changes as patient condition would be assessed at the facility and ongoing needs will require monitoring and assessment, Mara Perales states yes, this would be possible in their setting. Jose Manuel Maurer RN    Update:  12/4/17 3:00pm - provided update to questions to DON at [facility]. DON states they will not be able to order the CPAP and are unable to care for the king at this time. Patient presents as a candidate for short term skilled nursing services in a facility deemed as a SNF. Will update team and discuss with family in order to obtain facility choices for appropriate level of care.     Jose Manuel Maurer RN

## 2017-12-04 NOTE — DISCHARGE SUMMARY
Discharge Summary       PATIENT ID: Forrest Snyder  MRN: [id number]   YOB: 1930    DATE OF ADMISSION: 11/23/2017 12:19 PM    DATE OF DISCHARGE: 12/4/17   PRIMARY CARE PROVIDER: Dahiana Lee MD     ATTENDING PHYSICIAN: Jen Gamez  DISCHARGING PROVIDER: Damián Suh MD    To contact this individual call [phone] and ask the  to page. If unavailable ask to be transferred the Adult Hospitalist Department. CONSULTATIONS: IP CONSULT TO HOSPITALIST  IP CONSULT TO NEUROLOGY  IP CONSULT TO PSYCHIATRY  IP CONSULT TO UROLOGY    PROCEDURES/SURGERIES: * No surgery found *    ADMITTING [address] [place] [address] COURSE:   81 yo woman with obesity, bipolar disorder, COPD, HLD, HTN, and reported left ovarian cyst was BIBEMS to the ED from [address] [place] [address] on 11/23/17 with sudden onset slurred speech and difficulty swallowing. She presented under Code Stroke. Facility reportedly had C diff outbreak, and patient had diarrhea that tested C diff negative.  She was admitted to [facility] for [facility].      Assessment & Plan:      Sepsis likely due to UTI (POA)  - tachycardia, fever, leucocytosis, tachypnea on admission resolved  - On zosyn which will cover both UTI and atelectasis- completed  - added diflucan for 3 days for genial fungal infection- completed     Dysarthria   - resolved PTA  - CT head wo contrast 11/23 no acute findings and no change since 4/7/2016  - TTE 11/23 EF 60-65%, no RWMA  - b/l carotid duplex 11/24 unremarkable  - MRI brain 11/25 no acute; chronic changes  - started ASA 81mg daily  - regular diet per SLP  - TSH, vitamin D, B12 WNL     Left ovarian cyst (POA) - patient/family aware, no further workup as last time OB suggested conservative MGMT also pt may not be able to tolerate ant surgery king inserted due to re tension and need voiding trial      Diarrhea  - none since presentation; reportedly negative C diff at [facility]     COPD  - pt will need CPAP at 8 setting during sleep   - Does not qualify for trilogy per pulmonary will d/w pulm to see if CPAP will be enough      HTN - controlled at this time     Bipolar disorder - controlled on home meds will resume lithium     Rheumatoid arthritis - continue prednisone and methotrexate     DM2 - controlled, metformin on hold; FBS, SSI     Hypothyroidism - TFTs WNL; continue levothyroxine     Urinary retention s/p straight cath still retaining placed king cath will need voiding trial      Obesity, Body mass index is 31.59 kg/(m^2). PENDING TEST RESULTS:   At the time of discharge the following test results are still pending:     FOLLOW UP APPOINTMENTS:    Follow-up Information     Follow up With Details Comments Contact Info    [facility]  continued care [address] [address]    Dylan Roberts MD   [address] [place] [address]      Lauren Barbour MD In 4 weeks Voiding trial [address] [facility]  [phone]             ADDITIONAL CARE RECOMMENDATIONS: cpap at night    DIET: Regular Diet    ACTIVITY: Activity as tolerated    WOUND CARE:     EQUIPMENT needed:       DISCHARGE MEDICATIONS:  Current Discharge Medication List      START taking these medications    Details   aspirin 81 mg chewable tablet Take 1 Tab by mouth daily for 30 days. Qty: 30 Tab, Refills: 0         CONTINUE these medications which have NOT CHANGED    Details   fluticasone-vilanterol (BREO ELLIPTA) 100-25 mcg/dose inhaler Take 1 Puff by inhalation daily. polyethylene glycol (MIRALAX) 17 gram packet Take 17 g by mouth daily. tiotropium (SPIRIVA WITH HANDIHALER) 18 mcg inhalation capsule Take 1 Cap by inhalation daily. sorbitol 70 % solution Take 30 mL by mouth every eight (8) hours as needed for Other (constipation). lidocaine (ASPERCREME, LIDOCAINE,) 4 % topical cream Apply  to affected area two (2) times daily as needed for Pain. hydrocortisone (PROCTOSOL HC) 2.5 % rectal cream Insert  into rectum as needed for Hemorrhoids. PRN after bowel movements (hemorrhoids)      metFORMIN (GLUCOPHAGE) 1,000 mg tablet Take 1,000 mg by mouth two (2) times daily (with meals). clotrimazole-betamethasone (LOTRISONE) topical cream APPLY TOPICALLY TWICE DAILY  Qty: 15 g, Refills: 2      furosemide (LASIX) 20 mg tablet Take 40 mg by mouth daily. acetaminophen (TYLENOL) 325 mg tablet Take 650 mg by mouth every six (6) hours as needed for Pain. L. acidoph & paracasei- S therm- Bifido (COBY-Q) 8 billion cell cap cap Take 1 Cap by mouth daily. Qty: 10 Cap, Refills: 0      mirtazapine (REMERON) 7.5 mg tablet Take 1 Tab by mouth nightly. Qty: 30 Tab, Refills: 0      lithium carbonate 300 mg tablet Take 1 Tab by mouth daily. In the morning  Qty: 60 Tab, Refills: 1      levothyroxine (SYNTHROID) 50 mcg tablet Take 50 mcg by mouth Daily (before breakfast). omeprazole (PRILOSEC) 20 mg capsule Take 20 mg by mouth daily. cholecalciferol (VITAMIN D3) 1,000 unit cap Take 1,000 Units by mouth daily. albuterol-ipratropium (DUO-NEB) 2.5 mg-0.5 mg/3 ml nebulizer solution 3 mL by Nebulization route three (3) times daily. propranolol (INDERAL) 10 mg tablet Take 10 mg by mouth daily. atorvastatin (LIPITOR) 40 mg tablet Take 20 mg by mouth nightly. nystatin (MYCOSTATIN) powder Apply  to affected area two (2) times a day. guaiFENesin (MUCINEX) 1,200 mg Ta12 ER tablet Take 1,200 mg by mouth two (2) times a day. STOP taking these medications       cephALEXin (KEFLEX) 500 mg capsule Comments:   Reason for Stopping:                 NOTIFY YOUR PHYSICIAN FOR ANY OF THE FOLLOWING:   Fever over 101 degrees for 24 hours. Chest pain, shortness of breath, fever, chills, nausea, vomiting, diarrhea, change in mentation, falling, weakness, bleeding. Severe pain or pain not relieved by medications.   Or, any other signs or symptoms that you may have questions about.     DISPOSITION:    Home With:   OT  PT  HH  RN       Long term SNF/Inpatient Rehab   x Independent/assisted living    Hospice    Other:       PATIENT CONDITION AT DISCHARGE:     Functional status   x Poor     Deconditioned     Independent      Cognition    x Lucid     Forgetful     Dementia      Catheters/lines (plus indication)   x William     PICC     PEG     None      Code status    x Full code     DNR      PHYSICAL EXAMINATION AT DISCHARGE:     Constitutional:  alert and awake    ENT:  oral mucosa moist, oropharynx benign   Resp:  CTA bilaterally anteriorly   CV:  regular rhythm, normal rate,    GI:  +BS, soft, non distended, non tender, obese     Musculoskeletal:  moves all extremities    Neurologic:   AAOx2 to person and place (baseline), follows commands         CHRONIC MEDICAL DIAGNOSES:  Problem List as of 12/4/2017  Date Reviewed: 12/4/2017          Codes Class Noted - Resolved    Abdominal distension ICD-10-CM: R14.0  ICD-9-CM: 787.3  2/27/2016 - Present        Pelvic mass in female ICD-10-CM: R19.00  ICD-9-CM: 789.30  2/24/2016 - Present        Confusion ICD-10-CM: R41.0  ICD-9-CM: 298.9  2/21/2016 - Present        Bipolar affective disorder, manic, severe ([facility] ICD-10-CM: F31.13  ICD-9-CM: 296.43 Acute 12/12/2012 - Present        * (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

## 2017-12-04 NOTE — PROGRESS NOTES
LUCRECIA reviewed chart and noted that this pt should go to a skilled nursing facility where she can obtain a CPAP at night. LUCRECIA attempted to call Sunny ([phone]), but he was not able to talk. He requested that this CM speak to his brother, Avelina Aguila. LUCRECIA met with pt and Yeison, at pt's bedside. CM informed them that the [facility] informed CM that they are unable to provide pt with a CPAP machine. LUCRECIA gave him a list of area Skilled nursing facilities and asked him to make choices. He said that he is going to make some phone calls and then call this CM with preference. Cm did provide him with this CM's phone number. Will follow.  Rafa Zendejas

## 2017-12-04 NOTE — PROGRESS NOTES
Problem: Pressure Injury - Risk of  Goal: *Prevention of pressure ulcer  Outcome: Progressing Towards Goal  Maintain Q2 turning schedule. Keeping linens dry. Moisture barrier to protect from incontinence. Will continue to monitor skin. 1945- Bedside and Verbal shift change report given to [employer] (oncoming nurse) by Tracie Chavarria (offgoing nurse). Report included the following information SBAR, Kardex, Intake/Output, MAR and Cardiac Rhythm NSR.

## 2017-12-04 NOTE — PROGRESS NOTES
Problem: TIA/CVA Stroke: Day 2 Until Discharge  Goal: *Ability to perform ADLs and demonstrates progressive mobility and function  Outcome: Not Progressing Towards Goal  Patient is complete care at this time

## 2017-12-04 NOTE — PROGRESS NOTES
Problem: Falls - Risk of  Goal: *Absence of Falls  Document Kristina Fall Risk and appropriate interventions in the flowsheet. Outcome: Progressing Towards Goal  Fall Risk Interventions:  Mobility Interventions: Bed/chair exit alarm    Mentation Interventions: Bed/chair exit alarm, Adequate sleep, hydration, pain control, Reorient patient, More frequent rounding    Medication Interventions: Bed/chair exit alarm, Patient to call before getting OOB    Elimination Interventions: Call light in reach, Toileting schedule/hourly rounds, Toilet paper/wipes in reach      Bed in low position. Locked bed wheels. Call bell and personal items within reach. Side rails up x3. Hourly rounding performed. Problem: Pressure Injury - Risk of  Goal: *Prevention of pressure ulcer  Outcome: Progressing Towards Goal  Turning Q2 hrs. Skin is dry, clean and intact. Linens kept dry. Creams applied for skin breakdown.

## 2017-12-04 NOTE — PROGRESS NOTES
Pulmonary, Critical Care, and Sleep Medicine~Progress Note    Name: June Cade MRN: [id number]   : 1930 Hospital: [place] [address]   Date: 2017 9:53 AM Admission: 2017     IMPRESSION:   · Acute reps failure secondary to LLL CAP, likely OHS/GLORIA and COPD  · Bipolar, on lithium  · CAD  · RA on MTX  · COPD, unknown FEV1. Not bronchospastic       PLAN:   · Lovenox/protonix   · On zosyn   · Her mildly hypercapnic state is not contributing to her agitation. · Duonebs, add Pulmicort   · Bipap at night  · Does not qualify for trilogy; Agree with CPAP at living facility   · O2 titration above 90%  · Bronchial hygiene   · Noted going home; discussed with RN      Daily Progression:    In no resp distress     I have reviewed the labs and previous days notes. Review of systems not obtained due to patient factors. OBJECTIVE:     Vital Signs:       Visit Vitals    /80 (BP 1 Location: Left arm, BP Patient Position: At rest;Supine)    Pulse 70    Temp 98.6 °F (37 °C)    Resp 20    Ht 5' 5\" (1.651 m)    Wt 86.5 kg (190 lb 11.2 oz)    SpO2 98%    BMI 31.73 kg/m2      Temp (24hrs), Av.5 °F (36.9 °C), Min:98.1 °F (36.7 °C), Max:98.9 °F (37.2 °C)     Intake/Output:     Last shift: 701 - 1900  In: 8912.5 [P.O.:700;  I.V.:8212.5]  Out: 800 [Urine:800]    Last 3 shifts: 1901 -  0700  In: -   Out: 4616 [Urine:5350]          Intake/Output Summary (Last 24 hours) at 17 1032  Last data filed at 17 8995   Gross per 24 hour   Intake           8912.5 ml   Output             4450 ml   Net           4462.5 ml       Physical Exam:                                        Exam Findings Other   General: No resp distress noted, appears stated age    HEENT:  No ulcers, JVD not elevated, no cervical LAD    Chest: No pectus deformity, normal chest rise b/l    HEART:  RRR, no murmurs/rubs/gallops Lungs:  CTA b/l, no rhonchi/crackles/wheeze, diminished BS at bases    ABD: Soft/NT, non rigid mildly distended    EXT: No cyanosis/clubbing/edema, normal peripheral pulses    Skin: No rashes or ulcers, no mottling    Neuro: Alert         Medications:  Current Facility-Administered Medications   Medication Dose Route Frequency    0.9% sodium chloride infusion  125 mL/hr IntraVENous CONTINUOUS    lithium citrate 8 mEq/5 mL (5 mL) oral solution 300 mg  300 mg Oral DAILY    miconazole (SECURA) 2 % extra thick cream   Topical Q12H    albuterol-ipratropium (DUO-NEB) 2.5 MG-0.5 MG/3 ML  3 mL Nebulization TID RT    budesonide (PULMICORT) 500 mcg/2 ml nebulizer suspension  500 mcg Nebulization BID RT    levothyroxine (SYNTHROID) tablet 50 mcg  50 mcg Oral 6am    piperacillin-tazobactam (ZOSYN) 10.125 g in  mL infusion  10.125 g IntraVENous Q24H    enoxaparin (LOVENOX) injection 40 mg  40 mg SubCUTAneous Q24H    balsam peru-castor oil (VENELEX)  mg/gram ointment   Topical BID    aspirin chewable tablet 81 mg  81 mg Oral DAILY    polyethylene glycol (MIRALAX) packet 17 g  17 g Oral DAILY PRN    acetaminophen (TYLENOL) tablet 650 mg  650 mg Oral Q6H PRN    albuterol-ipratropium (DUO-NEB) 2.5 MG-0.5 MG/3 ML  3 mL Nebulization Q6H PRN    atorvastatin (LIPITOR) tablet 20 mg  20 mg Oral QHS    guaiFENesin ER (MUCINEX) tablet 1,200 mg  1,200 mg Oral BID    lactobac ac& pc-s.therm-b.anim (COBY Q/RISAQUAD)  1 Cap Oral DAILY    mirtazapine (REMERON) tablet 7.5 mg  7.5 mg Oral QHS    pantoprazole (PROTONIX) tablet 40 mg  40 mg Oral DAILY    propranolol (INDERAL) tablet 10 mg  10 mg Oral DAILY    insulin lispro (HUMALOG) injection   SubCUTAneous AC&HS    glucose chewable tablet 16 g  4 Tab Oral PRN    dextrose (D50W) injection syrg 12.5-25 g  12.5-25 g IntraVENous PRN    glucagon (GLUCAGEN) injection 1 mg  1 mg IntraMUSCular PRN       Labs:  ABG No results for input(s): PHI, PCO2I, PO2I, HCO3I, SO2I, FIO2I in the last 72 hours.      CBC Recent Labs      12/02/17   0042   WBC  9.6   HGB  11.9   HCT  38.7   PLT  382   MCV  94.4   MCH  52.2        Metabolic  Panel Recent Labs      12/02/17   0644  12/02/17 0042   NA  149*  149*   K  3.4*  2.7*   CL  116*  114*   CO2  27  26   GLU  135*  134*   BUN  15  15   CREA  0.98  1.11*   CA  9.2  9.6        Pertinent Labs                WILMER Toro  12/4/2017

## 2017-12-05 VITALS
RESPIRATION RATE: 24 BRPM | SYSTOLIC BLOOD PRESSURE: 160 MMHG | OXYGEN SATURATION: 100 % | HEIGHT: 65 IN | DIASTOLIC BLOOD PRESSURE: 60 MMHG | WEIGHT: 203.48 LBS | HEART RATE: 67 BPM | BODY MASS INDEX: 33.9 KG/M2 | TEMPERATURE: 98.1 F

## 2017-12-05 LAB
GLUCOSE BLD STRIP.AUTO-MCNC: 103 MG/DL (ref 65–100)
GLUCOSE BLD STRIP.AUTO-MCNC: 123 MG/DL (ref 65–100)
GLUCOSE BLD STRIP.AUTO-MCNC: 148 MG/DL (ref 65–100)
SERVICE CMNT-IMP: ABNORMAL

## 2017-12-05 PROCEDURE — 92526 ORAL FUNCTION THERAPY: CPT

## 2017-12-05 PROCEDURE — 74011000250 HC RX REV CODE- 250: Performed by: FAMILY MEDICINE

## 2017-12-05 PROCEDURE — 74011250637 HC RX REV CODE- 250/637: Performed by: HOSPITALIST

## 2017-12-05 PROCEDURE — 82962 GLUCOSE BLOOD TEST: CPT

## 2017-12-05 PROCEDURE — 74011250637 HC RX REV CODE- 250/637: Performed by: INTERNAL MEDICINE

## 2017-12-05 PROCEDURE — 74011250637 HC RX REV CODE- 250/637: Performed by: FAMILY MEDICINE

## 2017-12-05 PROCEDURE — 74011000250 HC RX REV CODE- 250: Performed by: PHYSICIAN ASSISTANT

## 2017-12-05 PROCEDURE — 94640 AIRWAY INHALATION TREATMENT: CPT

## 2017-12-05 RX ADMIN — BUDESONIDE 500 MCG: 0.5 INHALANT RESPIRATORY (INHALATION) at 08:08

## 2017-12-05 RX ADMIN — Medication 1 CAPSULE: at 09:35

## 2017-12-05 RX ADMIN — CASTOR OIL AND BALSAM, PERU: 788; 87 OINTMENT TOPICAL at 09:38

## 2017-12-05 RX ADMIN — ASPIRIN 81 MG 81 MG: 81 TABLET ORAL at 09:35

## 2017-12-05 RX ADMIN — MICONAZOLE NITRATE: 20 CREAM TOPICAL at 09:38

## 2017-12-05 RX ADMIN — LEVOTHYROXINE SODIUM 50 MCG: 50 TABLET ORAL at 05:27

## 2017-12-05 RX ADMIN — GUAIFENESIN 1200 MG: 600 TABLET, EXTENDED RELEASE ORAL at 09:35

## 2017-12-05 RX ADMIN — PROPRANOLOL HYDROCHLORIDE 10 MG: 10 TABLET ORAL at 09:35

## 2017-12-05 RX ADMIN — LITHIUM 300 MG: 8 SOLUTION ORAL at 09:35

## 2017-12-05 RX ADMIN — IPRATROPIUM BROMIDE AND ALBUTEROL SULFATE 3 ML: .5; 3 SOLUTION RESPIRATORY (INHALATION) at 08:08

## 2017-12-05 RX ADMIN — ACETAMINOPHEN 650 MG: 325 TABLET, FILM COATED ORAL at 03:43

## 2017-12-05 RX ADMIN — IPRATROPIUM BROMIDE AND ALBUTEROL SULFATE 3 ML: .5; 3 SOLUTION RESPIRATORY (INHALATION) at 12:34

## 2017-12-05 RX ADMIN — PANTOPRAZOLE SODIUM 40 MG: 40 TABLET, DELAYED RELEASE ORAL at 09:35

## 2017-12-05 NOTE — ROUTINE PROCESS
Care Management Note    Received call from [facility] checking on transportation, call made to [facility] they are on scene will be at room within 10 minutes.     Von Frazier, CRM

## 2017-12-05 NOTE — DISCHARGE SUMMARY
Discharge Summary       PATIENT ID: Aman Jeter  MRN: [id number]   YOB: 1930    DATE OF ADMISSION: 11/23/2017 12:19 PM    DATE OF DISCHARGE: 12/4/17   PRIMARY CARE PROVIDER: Nathen Grewal MD     ATTENDING PHYSICIAN: Reed Hare  DISCHARGING PROVIDER: Svetlana Tolentino MD    To contact this individual call [phone] and ask the  to page. If unavailable ask to be transferred the Adult Hospitalist Department. CONSULTATIONS: IP CONSULT TO HOSPITALIST  IP CONSULT TO NEUROLOGY  IP CONSULT TO PSYCHIATRY  IP CONSULT TO UROLOGY    PROCEDURES/SURGERIES: * No surgery found *    ADMITTING [facility] COURSE:   79 yo woman with obesity, bipolar disorder, COPD, HLD, HTN, and reported left ovarian cyst was BIBEMS to the ED from [address] [place] [address] on 11/23/17 with sudden onset slurred speech and difficulty swallowing. She presented under Code Stroke. Facility reportedly had C diff outbreak, and patient had diarrhea that tested C diff negative.  She was admitted to [facility] for dysarthria.      Assessment & Plan:      Sepsis likely due to UTI (POA)  - tachycardia, fever, leucocytosis, tachypnea on admission resolved  - On zosyn which will cover both UTI and atelectasis- completed  - added diflucan for 3 days for genial fungal infection- completed     Dysarthria   - resolved PTA  - CT head wo contrast 11/23 no acute findings and no change since 4/7/2016  - TTE 11/23 EF 60-65%, no RWMA  - b/l carotid duplex 11/24 unremarkable  - MRI brain 11/25 no acute; chronic changes  - started ASA 81mg daily  - regular diet per SLP  - TSH, vitamin D, B12 WNL     Left ovarian cyst (POA) - patient/family aware, no further workup as last time OB suggested conservative MGMT also pt may not be able to tolerate ant surgery king inserted due to re tension and need voiding trial      Diarrhea  - none since presentation; reportedly negative C diff at [facility]     COPD  - pt will need CPAP at 8 setting during sleep . RX written for such  - Does not qualify for trilogy per pulmonary will d/w pulm to see if CPAP will be enough      HTN - controlled at this time     Bipolar disorder - controlled on home meds will resume lithium     Rheumatoid arthritis - continue prednisone and methotrexate     DM2 - controlled, metformin on hold; FBS, SSI     Hypothyroidism - TFTs WNL; continue levothyroxine     Urinary retention s/p straight cath still retaining placed king cath will need voiding trial      Obesity, Body mass index is 31.59 kg/(m^2). PENDING TEST RESULTS:   At the time of discharge the following test results are still pending:     FOLLOW UP APPOINTMENTS:    Follow-up Information     Follow up With Details Comments Contact Info    [place]  continued care [address] [address]    Stormy Muñoz MD   [address]      Traci Matias MD In 4 weeks Voiding trial [address] [facility]  [phone]             ADDITIONAL CARE RECOMMENDATIONS: cpap at night    DIET: Regular [facility] soft Diet    ACTIVITY: Activity as tolerated    WOUND CARE:     EQUIPMENT needed: cpap at 8 setting      DISCHARGE MEDICATIONS:  Current Discharge Medication List      START taking these medications    Details   aspirin 81 mg chewable tablet Take 1 Tab by mouth daily for 30 days. Qty: 30 Tab, Refills: 0         CONTINUE these medications which have NOT CHANGED    Details   fluticasone-vilanterol (BREO ELLIPTA) 100-25 mcg/dose inhaler Take 1 Puff by inhalation daily. polyethylene glycol (MIRALAX) 17 gram packet Take 17 g by mouth daily. tiotropium (SPIRIVA WITH HANDIHALER) 18 mcg inhalation capsule Take 1 Cap by inhalation daily. sorbitol 70 % solution Take 30 mL by mouth every eight (8) hours as needed for Other (constipation).       lidocaine (ASPERCREME, LIDOCAINE,) 4 % topical cream Apply  to affected area two (2) times daily as needed for Pain.      hydrocortisone (PROCTOSOL HC) 2.5 % rectal cream Insert  into rectum as needed for Hemorrhoids. PRN after bowel movements (hemorrhoids)      metFORMIN (GLUCOPHAGE) 1,000 mg tablet Take 1,000 mg by mouth two (2) times daily (with meals). clotrimazole-betamethasone (LOTRISONE) topical cream APPLY TOPICALLY TWICE DAILY  Qty: 15 g, Refills: 2      furosemide (LASIX) 20 mg tablet Take 40 mg by mouth daily. acetaminophen (TYLENOL) 325 mg tablet Take 650 mg by mouth every six (6) hours as needed for Pain. L. acidoph & paracasei- S therm- Bifido (COBY-Q) 8 billion cell cap cap Take 1 Cap by mouth daily. Qty: 10 Cap, Refills: 0      mirtazapine (REMERON) 7.5 mg tablet Take 1 Tab by mouth nightly. Qty: 30 Tab, Refills: 0      lithium carbonate 300 mg tablet Take 1 Tab by mouth daily. In the morning  Qty: 60 Tab, Refills: 1      levothyroxine (SYNTHROID) 50 mcg tablet Take 50 mcg by mouth Daily (before breakfast). omeprazole (PRILOSEC) 20 mg capsule Take 20 mg by mouth daily. cholecalciferol (VITAMIN D3) 1,000 unit cap Take 1,000 Units by mouth daily. albuterol-ipratropium (DUO-NEB) 2.5 mg-0.5 mg/3 ml nebulizer solution 3 mL by Nebulization route three (3) times daily. propranolol (INDERAL) 10 mg tablet Take 10 mg by mouth daily. atorvastatin (LIPITOR) 40 mg tablet Take 20 mg by mouth nightly. nystatin (MYCOSTATIN) powder Apply  to affected area two (2) times a day. guaiFENesin (MUCINEX) 1,200 mg Ta12 ER tablet Take 1,200 mg by mouth two (2) times a day. STOP taking these medications       cephALEXin (KEFLEX) 500 mg capsule Comments:   Reason for Stopping:                 NOTIFY YOUR PHYSICIAN FOR ANY OF THE FOLLOWING:   Fever over 101 degrees for 24 hours. Chest pain, shortness of breath, fever, chills, nausea, vomiting, diarrhea, change in mentation, falling, weakness, bleeding.  Severe pain or pain not relieved by medications. Or, any other signs or symptoms that you may have questions about.     DISPOSITION:    Home With:   OT  PT  HH  RN       Long term SNF/Inpatient Rehab   x Independent/assisted living    Hospice    Other:       PATIENT CONDITION AT DISCHARGE:     Functional status   x Poor     Deconditioned     Independent      Cognition    x Lucid     Forgetful     Dementia      Catheters/lines (plus indication)   x William     PICC     PEG     None      Code status    x Full code     DNR      PHYSICAL EXAMINATION AT DISCHARGE:     Constitutional:  alert and awake    ENT:  oral mucosa moist, oropharynx benign   Resp:  CTA bilaterally anteriorly   CV:  regular rhythm, normal rate,    GI:  +BS, soft, non distended, non tender, obese     Musculoskeletal:  moves all extremities    Neurologic:   AAOx2 to person and place (baseline), follows commands         CHRONIC MEDICAL DIAGNOSES:  Problem List as of 12/5/2017  Date Reviewed: 12/4/2017          Codes Class Noted - Resolved    Abdominal distension ICD-10-CM: R14.0  ICD-9-CM: 787.3  2/27/2016 - Present        Pelvic mass in female ICD-10-CM: R19.00  ICD-9-CM: 789.30  2/24/2016 - Present        Confusion ICD-10-CM: R41.0  ICD-9-CM: 298.9  2/21/2016 - Present        Bipolar affective disorder, manic, severe ([facility] ICD-10-CM: F31.13  ICD-9-CM: 296.43 Acute 12/12/2012 - Present        * (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

## 2017-12-05 NOTE — PROGRESS NOTES
3: 30 p.m. Update:  [facility] delivering Cpap to [facility] by 4 pm today per patient's son. No ambulance preference, so [facility] will transport patient at 5 pm.      11:23 a.m. Update:  Faxed all requested information to Joaquin Ortez at [facility].  Will await her phone call regarding delivery time of cpap machine. Family is paying privately for cpap. 11:06 a.m. Update:  Received call back from [facility], [facility] [address] [place] [address], who stated they CAN take patient with a William. Patient's RN at [facility] today is Yang, [phone] phone, [phone] fax. Called and spoke with Miley Nowak, patient's son, and got name of [facility], who will sell the family a cpap machine. Spoke with Joaquin Ortez at [facility], [phone], who stated she needs a script that states CPAP, CPAP Supplies, Setting 8 (setting taken off of discharge summary). Eyison asked that I call his brother Jazmín Small when I know transport time for patient back to facility. Fax script and demographics to Mala at [phone], and she will call me to let me know when machine will be delivered. Update:  Spoke with Victorina Gusman, [phone], who said he spoke with patient's RN, Britney Mendenhall, at [facility] and she said they could take patient with William and Cpap was no problem if supplied by the family. Sunny did not know that the DON had said no after Britney Mendenhall, the RN said yes. Told Sunny I would call the DON and confirm what they can and cannot do and get back with him. Left VM for the DON at [facility] to call me back, [phone]. This CM understands from patient's RN that son has worked it worked with [employer] that he will purchase a Cpap for patient to use there, and they now can handle the patient with a William. I need to confirm with son.

## 2017-12-05 NOTE — ROUTINE PROCESS
TRANSFER - OUT REPORT:    Verbal report given to Emelina(name) on Forrest Snyder  being transferred to [place](unit) for routine progression of care       Report consisted of patients Situation, Background, Assessment and   Recommendations(SBAR). Information from the following report(s) SBAR, Kardex, Intake/Output, MAR, Recent Results and Cardiac Rhythm NSR was reviewed with the receiving nurse. Lines:       Opportunity for questions and clarification was provided.       Patient transported with:  GRAHAM

## 2017-12-05 NOTE — PROGRESS NOTES
Problem: Falls - Risk of  Goal: *Absence of Falls  Document Kristina Fall Risk and appropriate interventions in the flowsheet. Outcome: Progressing Towards Goal  Fall Risk Interventions:  Mobility Interventions: Bed/chair exit alarm    Mentation Interventions: Adequate sleep, hydration, pain control, Door open when patient unattended, Bed/chair exit alarm    Medication Interventions: Bed/chair exit alarm, Patient to call before getting OOB    Elimination Interventions: Bed/chair exit alarm, Call light in reach  Pt remains free of falls while in the hospital.  Call bell and frequently used items within reach. Pt provided with skid free socks and instructed to call out for assistance ambulating. Frequent purposeful rounding initiated by staff. Bed in low and locked position with 3 siderails up. Pt room near nurses station, door left open when unattended with supplemental lighting left on. Problem: Pressure Injury - Risk of  Goal: *Prevention of pressure ulcer  Outcome: Not Progressing Towards Goal  Pt on turn team, blood sugar monitored        Pt refused CPAP and linen change when pt spilled applesauce.

## 2017-12-05 NOTE — PROGRESS NOTES
Problem: Dysphagia (Adult)  Goal: *Acute Goals and Plan of Care (Insert Text)  Speech Goals   Re-eval 12/5/2017   1. Patient will tolerate a mechanical soft diet/ thin liquids free of s/s aspiration within 7 days     Initiated 11/28/2017  1. Patient will tolerate puree/thin liquid diet with no s/s of aspiration within 7 days discontinued 11/29/2017 RESUME  12/1/2017  MET 12/5/2017   2. Patient will participate in solid trials with timely and complete mastication and full oral clearance within 7 days discontinued 11/29/2017 MET 12/5/2017   3. Patient will participate in re-assessment of swallow function within 7 days met 12/1/2017     Speech language pathology dysphagia weekly re-assessment  Patient: Nell Bertrand (89 y.o. female)  Date: 12/5/2017  Diagnosis: CVA (cerebral vascular accident) [facility]) Hemispheric carotid artery syndrome       Precautions:       ASSESSMENT:  Patient with significant improvement in alertness today compared to last week. Patient with upper dentures only resulting in prolonged mastication of solids requiring many liquid washes to clear. Her pharyngeal swallow is WFL. No overt s/s aspiration with successive straw sips of thin or solid. Recommend mechanical soft diet/ thin liquids. Progress over last week: Patient with improved mental status compared to last week. She has been tolerating puree diet/ thin liquids all weekend. Ready to advance to mechanical soft. Progression toward goals:  []         Improving appropriately and progressing toward goals  []         Improving slowly and progressing toward goals  []         Not making progress toward goals and plan of care will be adjusted     PLAN:  Recommendations and Planned Interventions:  -- Mechanical soft diet/ thin liquids.  Straws ok  -- alternate liquids/solids to assist in oral clearance  -- fully upright  -- meds 1 at a time as tolerated    Patient continues to benefit from skilled intervention to address the above impairments. Continue treatment per established plan of care. SLP will follow 1x/wk  Discharge Recommendations:  None     SUBJECTIVE:   Patient stated My mouth is so dry. OBJECTIVE:   Cognitive and Communication Status:  Neurologic State: Alert  Orientation Level: Oriented to person  Cognition: Follows commands  Perception: Appears intact  Perseveration: No perseveration noted  Safety/Judgement: Not assessed  Dysphagia Treatment:  Oral Assessment:  Oral Assessment  Labial: No impairment  Dentition: Upper dentures (no bottom teeth)  Oral Hygiene:  (clean, moist)  Lingual: No impairment  Velum: Unable to visualize  Mandible: No impairment  P.O. Trials:  Patient Position:  (upright in bed)  Vocal quality prior to P.O.: No impairment  Consistency Presented: Thin liquid; Solid  How Presented: Successive swallows;Straw;SLP-fed/presented     Bolus Acceptance: No impairment  Bolus Formation/Control: Impaired  Type of Impairment: Delayed;Mastication  Propulsion: Delayed (# of seconds)  Oral Residue: None  Initiation of Swallow: No impairment  Laryngeal Elevation: Functional  Aspiration Signs/Symptoms: None  Pharyngeal Phase Characteristics: No impairment, issues, or problems              Oral Phase Severity: Mild-moderate  Pharyngeal Phase Severity : No impairment        Pain:  Pain Scale 1: Numeric (0 - 10)  Pain Intensity 1: 0     After treatment:   []              Patient left in no apparent distress sitting up in chair  [x]              Patient left in no apparent distress in bed  [x]              Call bell left within reach  [x]              Nursing notified  []              Caregiver present  []              Bed alarm activated    COMMUNICATION/EDUCATION:     The patients plan of care including recommendations, planned interventions, and recommended diet changes were discussed with: Registered Nurse. []              Posted safety precautions in patient's room.     WES Estrella  Time Calculation: 12 mins